# Patient Record
Sex: MALE | Race: BLACK OR AFRICAN AMERICAN
[De-identification: names, ages, dates, MRNs, and addresses within clinical notes are randomized per-mention and may not be internally consistent; named-entity substitution may affect disease eponyms.]

---

## 2018-06-24 ENCOUNTER — HOSPITAL ENCOUNTER (EMERGENCY)
Dept: HOSPITAL 74 - JER | Age: 63
LOS: 1 days | Discharge: LEFT BEFORE DISCHARGE | End: 2018-06-25
Payer: COMMERCIAL

## 2018-06-24 VITALS — SYSTOLIC BLOOD PRESSURE: 149 MMHG | HEART RATE: 82 BPM | TEMPERATURE: 98 F | DIASTOLIC BLOOD PRESSURE: 90 MMHG

## 2018-06-24 VITALS — BODY MASS INDEX: 35.7 KG/M2

## 2018-06-24 DIAGNOSIS — Z53.21: Primary | ICD-10-CM

## 2018-06-25 NOTE — PDOC
History of Present Illness





- General


Chief Complaint: Shortness of Breath


Stated Complaint: R ARM SWELLING


Time Seen by Provider: 06/25/18 00:04





- History of Present Illness


Initial Comments: 





06/25/18 00:07











The patient denies chest pain, shortness of breath, headache and dizziness. 

Denies fever, chills, nausea, vomit, diarrhea and constipation. Denies dysuria, 

frequency, urgency and hematuria. 


Allergies:





Past History





- Past Medical History


Allergies/Adverse Reactions: 


 Allergies











Allergy/AdvReac Type Severity Reaction Status Date / Time


 


No Known Allergies Allergy   Verified 06/24/18 23:50














- Suicide/Smoking/Psychosocial Hx


Smoking History: Never smoked


Have you smoked in the past 12 months: No


Information on smoking cessation initiated: No


Hx Alcohol Use: No


Drug/Substance Use Hx: No





**Review of Systems





- Review of Systems


Comments:: 





06/25/18 00:07


GENERAL/CONSTITUTIONAL: No fever or chills. No weakness.


HEAD, EYES, EARS, NOSE AND THROAT: No change in vision. No ear pain or 

discharge. No sore throat.


CARDIOVASCULAR: No chest pain or shortness of breath


RESPIRATORY: No cough, wheezing, or hemoptysis.


GASTROINTESTINAL: No nausea, vomiting, diarrhea or constipation.


GENITOURINARY: No dysuria, frequency, or change in urination.


MUSCULOSKELETAL: No joint or muscle swelling or pain. No neck or back pain.


SKIN: No rash


NEUROLOGIC: No headache, vertigo, loss of consciousness, or change in strength/

sensation.


ENDOCRINE: No increased thirst. No abnormal weight change


HEMATOLOGIC/LYMPHATIC: No anemia, easy bleeding, or history of blood clots.


ALLERGIC/IMMUNOLOGIC: No hives or skin allergy.





*Physical Exam





- Vital Signs


 Last Vital Signs











Temp Pulse Resp BP Pulse Ox


 


 98.0 F   82   16   149/90   90 L


 


 06/24/18 23:48  06/24/18 23:48  06/24/18 23:48  06/24/18 23:48  06/24/18 23:48














- Physical Exam


Comments: 





06/25/18 00:07


GENERAL: Awake, alert, and fully oriented, in no acute distress


HEAD: No signs of trauma, normocephalic, atraumatic 


EYES: PERRLA, EOMI, sclera anicteric, conjunctiva clear


ENT: Auricles normal inspection, hearing grossly normal, nares patent, 

oropharynx clear without


exudates. Moist mucosa


NECK: Normal ROM, supple, no lymphadenopathy, JVD, or masses


LUNGS: No distress, speaks full sentences, clear to auscultation bilaterally 


HEART: Regular rate and rhythm, normal S1 and S2, no murmurs, rubs or gallops, 

peripheral pulses normal and equal bilaterally. 


ABDOMEN: Soft, nontender, normoactive bowel sounds. No guarding, no rebound. No 

masses


EXTREMITIES: Normal inspection, Normal range of motion, no edema. No clubbing 

or cyanosis. 


NEUROLOGICAL: Cranial nerves II through XII grossly intact. Normal speech, 

normal gait, no focal sensorimotor deficits 


SKIN: Warm, Dry, normal turgor, no rashes or lesions noted. 





*DC/Admit/Observation/Transfer





- Referrals


Referrals: 


Mendez Junior MD [Primary Care Provider] - 





- Patient Instructions





- Post Discharge Activity

## 2019-03-07 ENCOUNTER — HOSPITAL ENCOUNTER (INPATIENT)
Dept: HOSPITAL 74 - JER | Age: 64
LOS: 9 days | Discharge: HOME | DRG: 190 | End: 2019-03-16
Attending: FAMILY MEDICINE | Admitting: FAMILY MEDICINE
Payer: COMMERCIAL

## 2019-03-07 VITALS — BODY MASS INDEX: 36.1 KG/M2

## 2019-03-07 DIAGNOSIS — J44.1: Primary | ICD-10-CM

## 2019-03-07 DIAGNOSIS — I11.0: ICD-10-CM

## 2019-03-07 DIAGNOSIS — J96.21: ICD-10-CM

## 2019-03-07 DIAGNOSIS — R91.1: ICD-10-CM

## 2019-03-07 DIAGNOSIS — E78.00: ICD-10-CM

## 2019-03-07 DIAGNOSIS — I50.33: ICD-10-CM

## 2019-03-07 DIAGNOSIS — E87.2: ICD-10-CM

## 2019-03-07 DIAGNOSIS — J96.22: ICD-10-CM

## 2019-03-07 DIAGNOSIS — E66.01: ICD-10-CM

## 2019-03-07 DIAGNOSIS — Z72.0: ICD-10-CM

## 2019-03-07 DIAGNOSIS — G47.33: ICD-10-CM

## 2019-03-07 LAB
ALBUMIN SERPL-MCNC: 3.7 G/DL (ref 3.4–5)
ALP SERPL-CCNC: 98 U/L (ref 45–117)
ALT SERPL-CCNC: 19 U/L (ref 13–61)
ANION GAP SERPL CALC-SCNC: 5 MMOL/L (ref 8–16)
ARTERIAL BLOOD GAS PCO2: 68.2 MMHG (ref 35–45)
ARTERIAL PATENCY WRIST A: POSITIVE
AST SERPL-CCNC: 13 U/L (ref 15–37)
BASE EXCESS BLDA CALC-SCNC: 7.7 MEQ/L (ref -2–2)
BASOPHILS # BLD: 0.8 % (ref 0–2)
BILIRUB SERPL-MCNC: 0.2 MG/DL (ref 0.2–1)
BNP SERPL-MCNC: 296.8 PG/ML (ref 5–125)
BUN SERPL-MCNC: 10 MG/DL (ref 7–18)
CALCIUM SERPL-MCNC: 8.8 MG/DL (ref 8.5–10.1)
CHLORIDE SERPL-SCNC: 102 MMOL/L (ref 98–107)
CO2 SERPL-SCNC: 35 MMOL/L (ref 21–32)
COHGB MFR BLD: 1 GM% (ref 0.5–2)
CREAT SERPL-MCNC: 1.1 MG/DL (ref 0.55–1.3)
DEPRECATED RDW RBC AUTO: 16.9 % (ref 11.9–15.9)
EOSINOPHIL # BLD: 6 % (ref 0–4.5)
GLUCOSE SERPL-MCNC: 105 MG/DL (ref 74–106)
HCT VFR BLD CALC: 41.4 % (ref 35.4–49)
HGB BLD-MCNC: 13.2 GM/DL (ref 11.7–16.9)
INR BLD: 1.05 (ref 0.83–1.09)
LYMPHOCYTES # BLD: 22 % (ref 8–40)
MAGNESIUM SERPL-MCNC: 1.9 MG/DL (ref 1.8–2.4)
MCH RBC QN AUTO: 28.5 PG (ref 25.7–33.7)
MCHC RBC AUTO-ENTMCNC: 32 G/DL (ref 32–35.9)
MCV RBC: 89.3 FL (ref 80–96)
MONOCYTES # BLD AUTO: 9.2 % (ref 3.8–10.2)
NEUTROPHILS # BLD: 62 % (ref 42.8–82.8)
PH BLDV: 7.34 [PH] (ref 7.32–7.42)
PLATELET # BLD AUTO: 266 K/MM3 (ref 134–434)
PMV BLD: 8.2 FL (ref 7.5–11.1)
PO2 BLDA: 60.3 MMHG (ref 80–100)
POTASSIUM SERPLBLD-SCNC: 4.2 MMOL/L (ref 3.5–5.1)
PROT SERPL-MCNC: 8 G/DL (ref 6.4–8.2)
PT PNL PPP: 12.4 SEC (ref 9.7–13)
RBC # BLD AUTO: 4.64 M/MM3 (ref 4–5.6)
SAO2 % BLDA: 87.5 % (ref 90–98.9)
SODIUM SERPL-SCNC: 142 MMOL/L (ref 136–145)
VENOUS PC02: 64.8 MMHG (ref 38–52)
VENOUS PO2: 130 MMHG (ref 28–48)
WBC # BLD AUTO: 6.8 K/MM3 (ref 4–10)

## 2019-03-07 NOTE — PDOC
History of Present Illness





- General


Chief Complaint: Shortness of Breath


Stated Complaint: PCP SENT/DIFFICULTY BREATHING


Time Seen by Provider: 03/07/19 17:58


History Source: Patient


Exam Limitations: No Limitations





- History of Present Illness


Initial Comments: 





03/07/19 19:19


The patient is a 63M with a PMH of COPD, asthma who was sent by pulmonologist (

Dr. Villegas) for admission for SOB. The patient states that he's had worsening 

SOB for the past week and a half. He admits to BAEZ but denies orthopnea. He 

admits to current SOB at rest. He denies CP, fever, chills, nausea, vomiting. 





Past History





- Past Medical History


Allergies/Adverse Reactions: 


 Allergies











Allergy/AdvReac Type Severity Reaction Status Date / Time


 


No Known Allergies Allergy   Verified 03/07/19 17:36














- Suicide/Smoking/Psychosocial Hx


Smoking History: Current every day smoker


Have you smoked in the past 12 months: No


Number of Cigarettes Smoked Daily: 40


Information on smoking cessation initiated: No


Hx Alcohol Use: No


Drug/Substance Use Hx: No





**Review of Systems





- Review of Systems


Able to Perform ROS?: Yes


Comments:: 





03/07/19 19:25


GENERAL/CONSTITUTIONAL: No fever or chills. No weakness.


HEAD, EYES, EARS, NOSE AND THROAT: No change in vision. No ear pain or 

discharge. No sore throat.


CARDIOVASCULAR: No chest pain, palpitations, or lightheadedness.


RESPIRATORY: + for SOB. No cough or hemoptysis.


GASTROINTESTINAL: No nausea, vomiting, diarrhea, constipation, or abdominal 

pain. 


GENITOURINARY: No dysuria, frequency, hematuria, or change in urination.


MUSCULOSKELETAL: No joint or muscle swelling or pain. No neck or back pain.


SKIN: No rash or lesions. 


NEUROLOGIC: No headache, numbness, tingling, focal weakness, loss of 

consciousness, or change in strength/sensation.





Is the patient limited English proficient: No





*Physical Exam





- Vital Signs


 Last Vital Signs











Temp Pulse Resp BP Pulse Ox


 


 98.0 F   81   18   141/82   100 


 


 03/07/19 17:36  03/07/19 18:44  03/07/19 18:44  03/07/19 17:36  03/07/19 18:44














- Physical Exam


Comments: 





03/07/19 19:28


GENERAL: Well developed, well nourished. Awake and alert. In mild distress.


HEENT: Normocephalic, atraumatic. Hearing grossly normal. Moist mucous 

membranes. PERRLA, EOMI. No conjunctival pallor. 


NECK: Supple. Full ROM. No JVD. 


CARDIOVASCULAR: Regular rate and rhythm. No murmurs, rubs, or gallops. Distal 

pulses are 2+ and symmetric. 


PULMONARY: Mild respiratory distress. Diffuse wheezing in b/l lung fields with 

decreased breath sounds in bases.


ABDOMINAL: Soft. Non-tender. Non-distended. No rebound or guarding. 


GENITOURINARY: No CVA tenderness bilaterally.


MUSCULOSKELETAL: Normal range of motion at all joints. No bony deformities or 

tenderness. 


EXTREMITIES: No cyanosis. No clubbing. No edema. No calf tenderness or swelling.


SKIN: Warm and dry. Normal capillary refill. No rashes. No jaundice. 


NEUROLOGICAL: Alert, awake, appropriate. Cranial nerves 2-12 grossly intact. 

Normal speech. Gait is normal without ataxia.


PSYCHIATRIC: Cooperative. Good eye contact. Appropriate mood and affect.








Moderate Sedation





- Procedure Monitoring


Vital Signs: 


Procedure Monitoring Vital Signs











Temperature  98.0 F   03/07/19 17:36


 


Pulse Rate  81   03/07/19 18:44


 


Respiratory Rate  18   03/07/19 18:44


 


Blood Pressure  141/82   03/07/19 17:36


 


O2 Sat by Pulse Oximetry (%)  100   03/07/19 18:44











ED Treatment Course





- LABORATORY


CBC & Chemistry Diagram: 


 03/07/19 17:15





 03/07/19 17:15





- ADDITIONAL ORDERS


Additional order review: 


 Laboratory  Results











  03/07/19





  17:15


 


Sodium  142


 


Potassium  4.2


 


Chloride  102


 


Carbon Dioxide  35 H


 


Anion Gap  5 L


 


BUN  10


 


Creatinine  1.1


 


Creat Clearance w eGFR  > 60


 


Random Glucose  105


 


Calcium  8.8


 


Magnesium  1.9


 


Total Bilirubin  0.2


 


AST  13 L


 


ALT  19


 


Alkaline Phosphatase  98


 


Creatine Kinase  404 H


 


Troponin I  < 0.02


 


B-Natriuretic Peptide  296.8 H


 


Total Protein  8.0


 


Albumin  3.7








 











  03/07/19





  17:15


 


RBC  4.64


 


MCV  89.3


 


MCHC  32.0


 


RDW  16.9 H


 


MPV  8.2


 


Neutrophils %  62.0


 


Lymphocytes %  22.0


 


Monocytes %  9.2


 


Eosinophils %  6.0 H


 


Basophils %  0.8














- RADIOLOGY


Radiology Studies Ordered: 














 Category Date Time Status


 


 CHEST X-RAY PORTABLE* [RAD] Stat Radiology  03/07/19 18:05 Taken














- Medications


Given in the ED: 


ED Medications














Discontinued Medications














Generic Name Dose Route Start Last Admin





  Trade Name Katrin  PRN Reason Stop Dose Admin


 


Albuterol/Ipratropium  3 amp  03/07/19 18:06  03/07/19 18:07





  Duoneb -  NEB  03/07/19 18:07  3 amp





  ONCE ONE   Administration





     





     





     





     


 


Methylprednisolone Sodium Succinate  125 mg  03/07/19 18:06  03/07/19 18:30





  Solu-Medrol -  IVPUSH  03/07/19 18:07  125 mg





  ONCE ONE   Administration





     





     





     





     














Medical Decision Making





- Medical Decision Making





03/07/19 19:29


The patient is a 63M with a PMH of COPD, asthma who presents to the ER via PCP'

s recommendations for SOB. Will treat as COPD exacerbation. Pt much more 

comfortable after treatments. Will admit for hypoxia workup.





*DC/Admit/Observation/Transfer


Diagnosis at time of Disposition: 


 SOB (shortness of breath)








- Discharge Dispostion


Condition at time of disposition: Guarded


Decision to Admit order: Yes





- Referrals


Referrals: 


Mendez Junior MD [Primary Care Provider] - 





- Patient Instructions





- Post Discharge Activity

## 2019-03-07 NOTE — PDOC
Rapid Medical Evaluation


Chief Complaint: Shortness of Breath


Medical Evaluation: 


 Allergies











Allergy/AdvReac Type Severity Reaction Status Date / Time


 


No Known Allergies Allergy   Verified 03/07/19 17:36











03/07/19 17:37


I have performed a brief in-person evaluation of this patient. 


The patient presents with a chief complaint of: SOB, cough, worsening x 2 weeks 


Pertinent physical exam findings: 3 word sentences, Pursed lipped , retractions 

dim breath sounds 


I have ordered the following: EkG


The patient will proceed to the ED for further evaluation. taken into ER for 

eval.


03/07/19 17:38





03/07/19 17:39





03/07/19 17:42








**Discharge Disposition





- Diagnosis


 SOB (shortness of breath)








- Referrals





- Patient Instructions





- Post Discharge Activity
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
## 2019-03-07 NOTE — PDOC
Attending Attestation





- Resident


Resident Name: Tulio Kaminski





- HPI


HPI: 





03/07/19 18:38


Pt presents to the ED complaining of the acute onset of shortness of breath and 

and leg swelling unrelieved by nebs at home.  Differential includes COPD 

exacerbation, CHF, less likely PE or PNA.  Will treat with nebs and steroids, 

check labs, EKG and CXR.  Will admit to medicine. 





<Mireille Rose - Last Filed: 03/07/19 18:37>





- HPI


HPI: 


The patient is a 63 year old male, with a significant PMH of COPD and asthma, 

who presents to the emergency department today complaining of shortness of 

breath for one week. Patient reports that his dyspnea on exertion has 

progressively worsened from baseline over the past week. Patient denies using 

O2 at home, but does endorse using his nebulizer. He reports bilateral lower 

extremity edema. Dr. Villegas advised patient to come to ED for treatment of his 

low O2 levels. 


 





The patient denies chest pain, headache and dizziness.


Denies fever, chills, nausea, vomit, diarrhea and constipation.


Denies dysuria, frequency, urgency and hematuria.





Allergies: NKA


Past surgical history:


Social history: No reported


PCP: Dr. Mendez Junior 


Pulmonologist: Dr. Mendez Villegas 





03/07/19 19:35








- Physicial Exam


PE: 


GENERAL: Awake, alert, and fully oriented, in no acute distress


HEAD: No signs of trauma


EYES: PERRLA, EOMI, sclera anicteric, conjunctiva clear


ENT: Auricles normal inspection, hearing grossly normal, nares patent, 

oropharynx clear without exudates. Moist mucosa


NECK: Normal ROM, supple, no lymphadenopathy, JVD, or masses


LUNGS: +Tachypneic. +Diffuse wheezing bilaterally. +Speaking in short sentences 

with mild conversational dyspnea. No crackles


HEART: Regular rate and rhythm, normal S1 and S2, no murmurs, rubs or gallops


ABDOMEN: Soft, nontender, normoactive bowel sounds.  No guarding, no rebound.  

No masses


EXTREMITIES: +1+ pitting edema to the mid calves bilaterally. Normal range of 

motion.  No clubbing or cyanosis. No cords, erythema, or tenderness


NEUROLOGICAL: Cranial nerves II through XII grossly intact.  Normal speech, 

normal gait


SKIN: Warm, Dry, normal turgor, no rashes or lesions noted.


03/07/19 19:35








- Medical Decision Making


EXAM#: TYPE/EXAM: RESULT: [id number] RAD/CHEST X-RAY PORTABLE* Shortness of 

breath. 


Since prior chest x-ray dated 11/17/2017, the cardiac silhouette appears to be 

slightly enlarged likely due to magnification. There are minimal bibasal 

atelectatic changes. The rest of the lung is clear. Mediastinum and visualized 

osseous structures appear intact Impression: Minimal bibasal atelectatic 

changes without gross evidence of infiltrates. Reported By: Elham Gray MD 03/07/ 19 19:35 





Documentation prepared by VIDA Zimmer, acting as medical scribe for 

Alejandro Britt MD.


03/07/19 19:36








<Diamante Barros - Last Filed: 03/07/19 19:38>

## 2019-03-08 LAB
ANION GAP SERPL CALC-SCNC: 2 MMOL/L (ref 8–16)
ARTERIAL BLOOD GAS PCO2: 62.1 MMHG (ref 35–45)
ARTERIAL PATENCY WRIST A: POSITIVE
BASE EXCESS BLDA CALC-SCNC: 11 MEQ/L (ref -2–2)
BASOPHILS # BLD: 0.2 % (ref 0–2)
BUN SERPL-MCNC: 13 MG/DL (ref 7–18)
CALCIUM SERPL-MCNC: 9.1 MG/DL (ref 8.5–10.1)
CHLORIDE SERPL-SCNC: 98 MMOL/L (ref 98–107)
CHOLEST SERPL-MCNC: 149 MG/DL (ref 50–200)
CO2 SERPL-SCNC: 39 MMOL/L (ref 21–32)
CREAT SERPL-MCNC: 0.9 MG/DL (ref 0.55–1.3)
DEPRECATED RDW RBC AUTO: 16.6 % (ref 11.9–15.9)
EOSINOPHIL # BLD: 0 % (ref 0–4.5)
GLUCOSE SERPL-MCNC: 124 MG/DL (ref 74–106)
HCT VFR BLD CALC: 41.8 % (ref 35.4–49)
HDLC SERPL-MCNC: 58 MG/DL (ref 40–60)
HGB BLD-MCNC: 13.3 GM/DL (ref 11.7–16.9)
LYMPHOCYTES # BLD: 10.4 % (ref 8–40)
MAGNESIUM SERPL-MCNC: 2 MG/DL (ref 1.8–2.4)
MCH RBC QN AUTO: 28.7 PG (ref 25.7–33.7)
MCHC RBC AUTO-ENTMCNC: 31.9 G/DL (ref 32–35.9)
MCV RBC: 90.2 FL (ref 80–96)
MONOCYTES # BLD AUTO: 1.3 % (ref 3.8–10.2)
NEUTROPHILS # BLD: 88.1 % (ref 42.8–82.8)
PLATELET # BLD AUTO: 272 K/MM3 (ref 134–434)
PMV BLD: 8.6 FL (ref 7.5–11.1)
PO2 BLDA: 58.5 MMHG (ref 80–100)
POTASSIUM SERPLBLD-SCNC: 5 MMOL/L (ref 3.5–5.1)
RBC # BLD AUTO: 4.63 M/MM3 (ref 4–5.6)
SAO2 % BLDA: 88.6 % (ref 90–98.9)
SODIUM SERPL-SCNC: 138 MMOL/L (ref 136–145)
TRIGL SERPL-MCNC: 51 MG/DL (ref 0–150)
WBC # BLD AUTO: 6.1 K/MM3 (ref 4–10)

## 2019-03-08 RX ADMIN — METHYLPREDNISOLONE SODIUM SUCCINATE SCH MG: 40 INJECTION, POWDER, FOR SOLUTION INTRAMUSCULAR; INTRAVENOUS at 22:45

## 2019-03-08 RX ADMIN — LISINOPRIL SCH MG: 10 TABLET ORAL at 10:08

## 2019-03-08 RX ADMIN — CEFTRIAXONE SCH MLS/HR: 1 INJECTION, POWDER, FOR SOLUTION INTRAMUSCULAR; INTRAVENOUS at 13:32

## 2019-03-08 RX ADMIN — FERROUS SULFATE TAB EC 324 MG (65 MG FE EQUIVALENT) SCH MG: 324 (65 FE) TABLET DELAYED RESPONSE at 13:31

## 2019-03-08 RX ADMIN — INSULIN ASPART SCH: 100 INJECTION, SOLUTION INTRAVENOUS; SUBCUTANEOUS at 22:45

## 2019-03-08 RX ADMIN — IPRATROPIUM BROMIDE AND ALBUTEROL SULFATE SCH AMP: .5; 3 SOLUTION RESPIRATORY (INHALATION) at 20:00

## 2019-03-08 RX ADMIN — METHYLPREDNISOLONE SODIUM SUCCINATE SCH MG: 40 INJECTION, POWDER, FOR SOLUTION INTRAMUSCULAR; INTRAVENOUS at 11:31

## 2019-03-08 RX ADMIN — INSULIN ASPART SCH: 100 INJECTION, SOLUTION INTRAVENOUS; SUBCUTANEOUS at 16:21

## 2019-03-08 RX ADMIN — ATORVASTATIN CALCIUM SCH MG: 80 TABLET, FILM COATED ORAL at 22:45

## 2019-03-08 RX ADMIN — AZITHROMYCIN SCH MG: 250 TABLET, FILM COATED ORAL at 10:08

## 2019-03-08 RX ADMIN — METHYLPREDNISOLONE SODIUM SUCCINATE SCH MG: 40 INJECTION, POWDER, FOR SOLUTION INTRAMUSCULAR; INTRAVENOUS at 16:23

## 2019-03-08 RX ADMIN — RANITIDINE SCH MG: 150 TABLET ORAL at 13:31

## 2019-03-08 RX ADMIN — IPRATROPIUM BROMIDE AND ALBUTEROL SULFATE SCH AMP: .5; 3 SOLUTION RESPIRATORY (INHALATION) at 16:31

## 2019-03-08 RX ADMIN — POTASSIUM CHLORIDE SCH MEQ: 750 TABLET, EXTENDED RELEASE ORAL at 10:08

## 2019-03-08 RX ADMIN — IPRATROPIUM BROMIDE AND ALBUTEROL SULFATE SCH AMP: .5; 3 SOLUTION RESPIRATORY (INHALATION) at 08:00

## 2019-03-08 RX ADMIN — IPRATROPIUM BROMIDE AND ALBUTEROL SULFATE SCH AMP: .5; 3 SOLUTION RESPIRATORY (INHALATION) at 11:40

## 2019-03-08 RX ADMIN — ENOXAPARIN SODIUM SCH MG: 40 INJECTION SUBCUTANEOUS at 11:31

## 2019-03-08 RX ADMIN — INSULIN ASPART SCH: 100 INJECTION, SOLUTION INTRAVENOUS; SUBCUTANEOUS at 11:40

## 2019-03-08 RX ADMIN — NICOTINE SCH MG: 21 PATCH TRANSDERMAL at 22:45

## 2019-03-08 NOTE — CON.CARD
Consult


Consult Specialty:: Cardiology


Reason for Consultation:: BAEZ





- History of Present Illness


Chief Complaint: SOB


History of Present Illness: 





This is a 63 year old male with a long smoking history and COPD. He has been 

complaining of worsening SOB fir the past 2 weeks. He also has orthopnea. 


EKG NSR at 81 BPM with normal intervals, normal axis, and NSSTTW changes.


Troponin negative





Echocardiogram 3/9/19


Normal LV function


EF 50 - 55%


mild MR


Mild TR














- Alcohol/Substance Use


Hx Alcohol Use: No





- Smoking History


Smoking history: Current every day smoker


Have you smoked in the past 12 months: No


Aproximately how many cigarettes per day: 40





Home Medications





- Allergies


Allergies/Adverse Reactions: 


 Allergies











Allergy/AdvReac Type Severity Reaction Status Date / Time


 


No Known Allergies Allergy   Verified 03/07/19 17:36














- Home Medications


Home Medications: 


Ambulatory Orders





Atorvastatin Ca [Lipitor] 80 mg PO HS 03/07/19 


Ferrous Sulfate 325 mg PO DAILY 03/07/19 


Furosemide [Lasix] 40 mg PO DAILY 03/07/19 


Lisinopril 10 mg PO DAILY 03/07/19 


Potassium Chloride 10 meq PO DAILY 03/07/19 


Ranitidine [Zantac -] 300 mg PO ONCE 03/07/19 


Albuterol 2.5/Ipratropium 0.5 [Duoneb -] 1 amp NEB PRN 03/08/19 


Prednisone [Deltasone] 20 mg PO DAILY 03/08/19 








Vital Signs: 


 Vital Signs











Temperature  97.6 F   03/08/19 10:10


 


Pulse Rate  84   03/08/19 10:10


 


Respiratory Rate  20   03/08/19 10:10


 


Blood Pressure  153/75   03/08/19 10:10


 


O2 Sat by Pulse Oximetry (%)  90 L  03/08/19 09:00











Constitutional: Yes: Mild Distress


HENT: Yes: WNL


Neck: Yes: WNL


Respiratory: Yes: Wheezes (Bilaterally)


Gastrointestinal: Yes: Soft


Cardiovascular: Yes: Regular Rate and Rhythm (NL S1S2 no MRHG)


JVD: No


Edema: LLE: 1+, RLE: 1+


Neurological: Yes: Alert, Oriented





- Other Data


Labs, Other Data: 


 CBC, BMP





 03/08/19 06:00 





 03/08/19 06:00 





 INR, PTT











INR  1.05  (0.83-1.09)   03/07/19  17:15    








 Troponin, BNP











  03/07/19 03/08/19 03/08/19





  17:15 06:00 11:45


 


Troponin I  < 0.02  < 0.02  0.03


 


B-Natriuretic Peptide  296.8 H  








 Troponin, BNP











  03/07/19 03/08/19 03/08/19





  17:15 06:00 11:45


 


Troponin I  < 0.02  < 0.02  0.03


 


B-Natriuretic Peptide  296.8 H  














Assessment/Plan





63 year old male with a long smoking history and COPD. He has been complaining 

of worsening SOB fir the past 2 weeks. He also has orthopnea. 


EKG NSR at 81 BPM with normal intervals, normal axis, and NSSTTW changes.


Troponin negative





Echocardiogram 3/9/19


Normal LV function


EF 50 - 55%


mild MR


Mild TR





COPD exacerbation


Also evidence for HFpEF


Would give Lasix 40 IVSS BID


Follow I's/O's/Wt's/Lytes





Continue Lisinopril 10 mg daily





Will follow with you.

## 2019-03-08 NOTE — PN
Progress Note (short form)





- Note


Progress Note: 


PULMONARY








CONSULTATION DICTATED 3/8/19








IMP ACUTE ON CHRONIC HYPOXEMIC/HYPERCAPNEIC RESPIRATORY FAILURE


      ADVANCED COPD WITH ACUTE EXACERBATION


      HTN


      ? H/O CHF


      TOBACCO ABUSE


       LIKELY WILSON


      





PLAN IV STEROIDS


        INHALED BRONCHODILATORS


        SUPPLEMENTAL O2


        NIPPV AS NEEDED


        LASIX PO 


        CARDIOLOGY EVALUATION








DR SMITH


   


      


       


  





Problem List





- Problems


(1) Acute on chronic respiratory failure with hypoxia and hypercapnia


Code(s): J96.21 - ACUTE AND CHRONIC RESPIRATORY FAILURE WITH HYPOXIA; J96.22 - 

ACUTE AND CHRONIC RESPIRATORY FAILURE WITH HYPERCAPNIA   





(2) SOB (shortness of breath)


Code(s): R06.02 - SHORTNESS OF BREATH   





(3) COPD exacerbation


Code(s): J44.1 - CHRONIC OBSTRUCTIVE PULMONARY DISEASE W (ACUTE) EXACERBATION   





(4) HTN (hypertension)


Code(s): I10 - ESSENTIAL (PRIMARY) HYPERTENSION   





(5) Tobacco abuse


Code(s): Z72.0 - TOBACCO USE   





(6) Tobacco abuse counseling


Code(s): Z71.6 - TOBACCO ABUSE COUNSELING

## 2019-03-08 NOTE — PN
Progress Note, Physician


Chief Complaint: 





patient seen and examined sitting up in bed says his breathing is better





- Current Medication List


Current Medications: 


Active Medications





Albuterol Sulfate (Ventolin 0.083% Nebulizer Soln -)  1 amp NEB Q4H PRN


   PRN Reason: SHORT OF BREATH/WHEEZING


Albuterol/Ipratropium (Duoneb -)  1 amp NEB RQID [facility]


   Last Admin: 03/08/19 11:40 Dose:  1 amp


Atorvastatin Calcium (Lipitor -)  80 mg PO HS [facility]


Azithromycin (Zithromax -)  500 mg PO DAILY [facility]


   Last Admin: 03/08/19 10:08 Dose:  500 mg


Enoxaparin Sodium (Lovenox -)  40 mg SQ DAILY [facility]


   Last Admin: 03/08/19 11:31 Dose:  40 mg


Ferrous Sulfate (Feosol -)  325 mg PO DAILY [facility]


Furosemide (Lasix -)  40 mg PO DAILY [facility]


Insulin Aspart (Novolog Vial Sliding Scale -)  0 vial SQ ACHS [facility]; Protocol


   Last Admin: 03/08/19 11:40 Dose:  Not Given


Lisinopril (Prinivil)  10 mg PO DAILY [facility]


   Last Admin: 03/08/19 10:08 Dose:  10 mg


Methylprednisolone Sodium Succinate (Solu-Medrol -)  60 mg IVPUSH Q6H-IV [facility]


   Last Admin: 03/08/19 11:31 Dose:  60 mg


Nicotine (Nicoderm Patch -)  14 mg TD ONCE ONE


   Stop: 03/08/19 19:49


Nicotine (Nicoderm Patch -)  21 mg TD [facility]


Potassium Chloride (K-Dur -)  10 meq PO DAILY [facility]


   Last Admin: 03/08/19 10:08 Dose:  10 meq


Ranitidine HCl (Zantac -)  300 mg PO DAILY [facility]











- Objective


Vital Signs: 


 Vital Signs











Temperature  97.6 F   03/08/19 10:10


 


Pulse Rate  84   03/08/19 10:10


 


Respiratory Rate  20   03/08/19 10:10


 


Blood Pressure  153/75   03/08/19 10:10


 


O2 Sat by Pulse Oximetry (%)  96   03/08/19 03:19











Constitutional: Yes: Calm


Cardiovascular: Yes: Regular Rate and Rhythm, S1, S2


Respiratory: Yes: Wheezes


Gastrointestinal: Yes: Normal Bowel Sounds, Soft


Edema: No


Neurological: Yes: Alert, Oriented


Labs: 


 CBC, BMP





 03/08/19 06:00 





 03/08/19 06:00 





 INR, PTT











INR  1.05  (0.83-1.09)   03/07/19  17:15    














Problem List





- Problems


(1) SOB (shortness of breath)


Assessment/Plan: 


oxygen


steroids


iv abx


chest ct done report pending


lovenox


lasix


echo report noted EF 55% left ventricle systolic function is normal


Code(s): R06.02 - SHORTNESS OF BREATH

## 2019-03-08 NOTE — EKG
Test Reason : 

Blood Pressure : ***/*** mmHG

Vent. Rate : 081 BPM     Atrial Rate : 081 BPM

   P-R Int : 182 ms          QRS Dur : 094 ms

    QT Int : 382 ms       P-R-T Axes : 054 079 065 degrees

   QTc Int : 443 ms

 

NORMAL SINUS RHYTHM

T WAVE ABNORMALITY, CONSIDER ANTEROLATERAL ISCHEMIA

ABNORMAL ECG

NO PREVIOUS ECGS AVAILABLE

Confirmed by CHETNA VILLAGRAN MD ([id number]) on 3/8/2019 2:20:58 PM

 

Referred By:             Confirmed By:CHETNA VILLAGRAN MD

## 2019-03-08 NOTE — PN
Teaching Attending Note


Name of Resident: Buddy Swan





ATTENDING PHYSICIAN STATEMENT





I saw and evaluated the patient.


I reviewed the resident's note and discussed the case with the resident.


I agree with the resident's findings and plan as documented.








SUBJECTIVE:states breathing is much improved. was recently diagnosed with "

heart failure" 6 months ago when admitted to [facility] but has never 

been admitted for breathing issues. was not started on any new medications 

since his last hospitalization. denies CP, fever, chills, N/V/C/D, does not 

have orthopnea as he always sleeps on 2 pillows








OBJECTIVE:


 Last Vital Signs











Temp Pulse Resp BP Pulse Ox


 


 97.6 F   84   20   153/75   96 


 


 03/08/19 10:10  03/08/19 10:10  03/08/19 10:10  03/08/19 10:10  03/08/19 03:19








 Intake & Output











 03/05/19 03/06/19 03/07/19 03/08/19





 23:59 23:59 23:59 23:59


 


Intake Total    690


 


Balance    690


 


Weight   244 lb 236 lb








General NAD


CV S1 S2 RRR no murmur/rub/gallop


Lungs diffuse expiratory wheezing


Abdomen soft NT/ND obese


Extremities no pedal edema





ASSESSMENT AND PLAN:


62yo M with PMH CHF, HTN and COPD presented to the ER wtih progressively 

worsening SOB x2 weeks and founf to be in hypercapnic respiratory failure


1. Acute hypercapnic respiratory failure- due to COPD exacerbation. was on 

bipap overnight and now on 2L NC saturating 95%. repeat ABG CT chest ordered. 

cont wtih Medrol 60mg Q6H, inhalers and nebulizers. 


2. Heart failure- unknown if systolic vs diastolic. does not appear to be 

volume overloaded at this time. with normal BNP. echo ordered by night team and 

will follow up. cardio consulted. will confirm home medications and re-start 

them. would hold off on lasix IV at this time. cardiac markers neg x2


3. HTN- slightly above goal. will titrate medications as needed


4. DVT ppx- lovenox

## 2019-03-08 NOTE — ECHO
______________________________________________________________________________



Name: ANN MARIE GRANADO                                      Exam:Adult Echocardiogram

MRN: [id number]         Study Date: 03/08/2019 10:15 AM

Age: 63 yrs

______________________________________________________________________________



Reason For Study: Evaluate for CHF

Height: 69 in        Weight: 244 lb        BSA: 2.2 m2



______________________________________________________________________________



MMode/2D Measurements & Calculations

IVSd: 1.1 cm                                                 ACS: 1.9 cm

LVIDd: 4.4 cm

LVIDs: 3.4 cm

LVPWd: 1.3 cm



______________________________________________________________

EDV(Teich): 87.3 ml                                          LVOT diam: 2.0 cm

ESV(Teich): 47.5 ml



______________________________________________________________

RV S James: 13.2 cm/sec



Doppler Measurements & Calculations

Med Peak E' James: 8.4 cm/sec

Lat Peak E' James: 16.4 cm/sec





______________________________________________________________________________

Procedure

The study was technically difficult with many images being suboptimal in quality.

Left Ventricle

Left ventricular systolic function is grossly normal. Ejection Fraction = 50-55%. Regional wall motio
n

abnormalities cannot be excluded due to limited visualization.

Right Ventricle

The right ventricle is grossly normal size. The right ventricular systolic function is grossly normal
.

Atria

Normal left and right atrial size and function.

Mitral Valve

The mitral valve is grossly normal. There is no mitral valve stenosis. There is mild mitral regurgita
tion.

Tricuspid Valve

The tricuspid valve is not well visualized, but is grossly normal.

Aortic Valve

There is mild aortic sclerosis.;. No hemodynamically significant valvular aortic stenosis. No aortic

regurgitation is present.

Pulmonic Valve

The pulmonic valve is not well visualized.

Great Vessels

The aortic root is normal size.

Pericardium/Pleura

There is no pericardial effusion.



______________________________________________________________________________



Interpretation Summary

The study was technically difficult with many images being suboptimal in quality.

Regional wall motion abnormalities cannot be excluded due to limited visualization.

Left ventricular systolic function is grossly normal.

Ejection Fraction = 50-55%.

There is mild mitral regurgitation.

There is mild aortic sclerosis.;

There is no pericardial effusion.







MD Chau *Mandy 03/08/2019 11:01 AM

## 2019-03-08 NOTE — CONS
DATE OF CONSULTATION:  03/08/2019

 

REFERRING PHYSICIAN:  Kelly Aguilar MD 

 

HISTORY:  The patient is a 63-year-old black male known to me from previous 
office

visits with past medical history of COPD on inhaled bronchodilators 
noncompliant with

longstanding history of tobacco use currently still smoking admitted to [facility] with the complaint of a 3- to 4-week-history of increasing

shortness of breath, dyspnea on exertion.  The patient came to my office 
yesterday

for follow up visit.  I have not seen the patient in over a year and a half.  
He was

previously prescribed long acting inhaled bronchodilators as well as 
anticholinergic

but stopped taking them many months ago. Pt presented to my office yesterday 
with

increasing shortness of breath and dyspnea on exertion.  He denied any 
complaints of

chest pain, nausea, or vomiting.  He did have a cough productive of clear 
sputum.  He was

states that he is only able to ambulate a couple of feet without developing 
shortness

of breath.  He also has increasing orthopnea and PND.

 There was no history of DVT or PE.  When I examined him in the

office, he was noted to be hypoxic with an O2 saturation of 75% on room air at 
which

time he was advised to go to the emergency room.  On admission, he was placed on

inhaled bronchodilators and IV steroids and transferred to the medical floor for

further management.  He had blood gas performed, which revealed acute on chronic

hypercapnic, hypoxemic respiratory failure.  The patient is not on home oxygen

therapy.  In the past, his O2 was 92 on room air approximately an hour and a 
half

ago.

 

PAST MEDICAL HISTORY:  Again, advanced COPD, hypertension, hypercholesterolemia.

 

REVIEW OF SYSTEMS:  Positive shortness of breath, positive orthopnea, PND.  No 
chest

pain, no palpitations.  Positive cough, positive wheezing.  No abdominal pain. 

Positive lower extremity edema.

 

CURRENT MEDICATIONS:  Include Solu-Medrol, Prinivil, Zithromax, Lovenox, 
Nicoderm,

albuterol, DuoNeb, Zantac, Lipitor, Lasix, and K-Dur.

 

PHYSICAL EXAMINATION: 

General:  The patient is an obese male well developed, awake, alert feeling 
better in

no acute respiratory distress.

Vital Signs:  He is afebrile.  Blood pressure 153/75, respiratory rate 20, O2

saturation 90% on 3 L.

HEENT:  Normocephalic and atraumatic.

Neck:  Supple.

Heart:  Regular S1, S2.

Chest:  Diffuse bilateral wheezes.

Abdomen:  Soft.  Bowel sounds positive.

Extremities:  Bilateral lower extremity edema.

 

LABORATORIES:  WBC 6.1, hemoglobin 13.3, hematocrit 41.8 with a platelet count 
of

272,000.  INR 1.05.  Blood gas initially 7.33, PCO2 of 68, PO2 of 60, 
bicarbonate 35,

saturation 87 that was on 2 L nasal cannula.  Most recent this morning 7.40, 
PCO2 of

62, PO2 of 58, bicarbonate of 37, saturation 88.6.  BUN 13, creatinine 0.9, BNP 
296. 

Of note, this patient had PFT yesterday in my office that revealed severe 
obstruction

with some improvement post bronchodilator.

 

Chest CT, no acute infiltrates or effusions.  COPD with central lobular 
emphysema

again seen.  Nodularity in the right upper lobe.  No change from exam December 5
, 2017.

 

IMPRESSION:  

1.  Acute on chronic hypercapnic, hypoxemic respiratory failure secondary to 
chronic

obstructive pulmonary disease exacerbation.

2.  History of hypertension.

3.  ? component of congestive heart failure.

 

PLAN:  IV steroids.  Inhaled bronchodilators.  Supplemental O2.   Lasix.  Daily

weighs.  We will obtain sleep screen.  Smoking cessation counseled.  Continue

Nicoderm patch.

 

 

CORNELIO SMITH M.D.

 

[id number]

DD: 03/08/2019 14:02

DT: 03/08/2019 14:25

Job #:  [id number]

MTDD

## 2019-03-08 NOTE — PN
Physical Exam: 


SUBJECTIVE: Patient seen and examined at bedside. no acute events since 

admission. pt feels better. states breathing is much improved. meds have been 

reconciled.  denies CP, fever, chills, N/V/C/D, does not have orthopnea as he 

always sleeps on 2 pillows








OBJECTIVE:





 Vital Signs











 Period  Temp  Pulse  Resp  BP Sys/Diaz  Pulse Ox


 


 Last 24 Hr  97.6 F-98.9 F  81-90  18-30  141-158/75-98  











GENERAL: The patient is awake, alert, and fully oriented, in no acute distress. 


HEAD: Normal with no signs of trauma.


EYES: PERRL, extraocular movements intact, sclera anicteric, conjunctiva clear. 

No ptosis. 


ENT: Ears normal, nares patent, oropharynx clear without exudates, moist mucous 

membranes.


NECK: Trachea midline, full range of motion, supple. 


LUNGS: Lungs diffuse expiratory wheezing


HEART: Regular rate and rhythm, S1, S2 without murmur, rub or gallop.


ABDOMEN: Soft, nontender, nondistended, normoactive bowel sounds, no guarding, 

no rebound, no hepatosplenomegaly, no masses.


EXTREMITIES: 2+ pulses, warm, well-perfused, no edema. 


NEUROLOGICAL: Cranial nerves II through XII grossly intact. Normal speech, gait 

not observed.


PSYCH: Normal mood, normal affect.


SKIN: Warm, dry, normal turgor, no rashes or lesions noted














 Laboratory Results - last 24 hr











  03/07/19 03/07/19 03/07/19





  17:15 17:15 17:15


 


WBC  6.8  


 


RBC  4.64  


 


Hgb  13.2  


 


Hct  41.4  


 


MCV  89.3  


 


MCH  28.5  


 


MCHC  32.0  


 


RDW  16.9 H  


 


Plt Count  266  


 


MPV  8.2  


 


Absolute Neuts (auto)  4.2  


 


Neutrophils %  62.0  


 


Lymphocytes %  22.0  


 


Monocytes %  9.2  


 


Eosinophils %  6.0 H  


 


Basophils %  0.8  


 


Nucleated RBC %  0  


 


PT with INR   12.40 


 


INR   1.05 


 


Anticoagulation Therapy   


 


Puncture Site   


 


ABG pH   


 


ABG pCO2 at Pt Temp   


 


ABG pO2 at Pt Temp   


 


ABG HCO3   


 


ABG O2 Sat (Measured)   


 


ABG O2 Content   


 


ABG Base Excess   


 


Vito Test   


 


VBG pH   


 


POC VBG pCO2   


 


POC VBG pO2   


 


VBG HCO3   


 


VBG O2 Sat (Yared)   


 


VBG Base Excess   


 


Carboxyhemoglobin   


 


Methemoglobin   


 


O2 Delivery Device   


 


Oxygen Flow Rate   


 


Vent Mode   


 


Vent Rate   


 


Mechanical Rate   


 


Pressure Support Vent   


 


Sodium    142


 


Potassium    4.2


 


Chloride    102


 


Carbon Dioxide    35 H


 


Anion Gap    5 L


 


BUN    10


 


Creatinine    1.1


 


Creat Clearance w eGFR    > 60


 


POC Glucometer   


 


Random Glucose    105


 


Hemoglobin A1c %   


 


Calcium    8.8


 


Magnesium    1.9


 


Total Bilirubin    0.2


 


AST    13 L


 


ALT    19


 


Alkaline Phosphatase    98


 


Creatine Kinase    404 H


 


Creatine Kinase Index    0.8


 


CK-MB (CK-2)    3.5


 


Troponin I    < 0.02


 


B-Natriuretic Peptide    296.8 H


 


Total Protein    8.0


 


Albumin    3.7


 


Triglycerides   


 


Cholesterol   


 


Total LDL Cholesterol   


 


HDL Cholesterol   














  03/07/19 03/07/19 03/08/19





  18:34 19:30 06:00


 


WBC    6.1


 


RBC    4.63


 


Hgb    13.3


 


Hct    41.8


 


MCV    90.2


 


MCH    28.7


 


MCHC    31.9 L


 


RDW    16.6 H


 


Plt Count    272


 


MPV    8.6


 


Absolute Neuts (auto)    5.4


 


Neutrophils %    88.1 H D


 


Lymphocytes %    10.4  D


 


Monocytes %    1.3 L D


 


Eosinophils %    0.0  D


 


Basophils %    0.2


 


Nucleated RBC %    0


 


PT with INR   


 


INR   


 


Anticoagulation Therapy   No Result Required. 


 


Puncture Site   Left radial 


 


ABG pH   7.33 L 


 


ABG pCO2 at Pt Temp   68.2 H* 


 


ABG pO2 at Pt Temp   60.3 L 


 


ABG HCO3   35.3 H 


 


ABG O2 Sat (Measured)   87.5 L 


 


ABG O2 Content   15.0 


 


ABG Base Excess   7.7 H 


 


Vito Test   Positive 


 


VBG pH  7.34  


 


POC VBG pCO2  64.8 H*  


 


POC VBG pO2  130.0 H  


 


VBG HCO3  34.1 L*  


 


VBG O2 Sat (Yared)  98.4 H*  


 


VBG Base Excess  6.8 H  


 


Carboxyhemoglobin   1.0 


 


Methemoglobin   0.1 L 


 


O2 Delivery Device   Nasal 


 


Oxygen Flow Rate   2l 


 


Vent Mode   No Result Required. 


 


Vent Rate   No Result Required. 


 


Mechanical Rate   No Result Required. 


 


Pressure Support Vent   No Result Required. 


 


Sodium   


 


Potassium   


 


Chloride   


 


Carbon Dioxide   


 


Anion Gap   


 


BUN   


 


Creatinine   


 


Creat Clearance w eGFR   


 


POC Glucometer   


 


Random Glucose   


 


Hemoglobin A1c %   


 


Calcium   


 


Magnesium   


 


Total Bilirubin   


 


AST   


 


ALT   


 


Alkaline Phosphatase   


 


Creatine Kinase   


 


Creatine Kinase Index   


 


CK-MB (CK-2)   


 


Troponin I   


 


B-Natriuretic Peptide   


 


Total Protein   


 


Albumin   


 


Triglycerides   


 


Cholesterol   


 


Total LDL Cholesterol   


 


HDL Cholesterol   














  03/08/19 03/08/19 03/08/19





  06:00 06:00 10:16


 


WBC   


 


RBC   


 


Hgb   


 


Hct   


 


MCV   


 


MCH   


 


MCHC   


 


RDW   


 


Plt Count   


 


MPV   


 


Absolute Neuts (auto)   


 


Neutrophils %   


 


Lymphocytes %   


 


Monocytes %   


 


Eosinophils %   


 


Basophils %   


 


Nucleated RBC %   


 


PT with INR   


 


INR   


 


Anticoagulation Therapy   


 


Puncture Site    Right radial


 


ABG pH    7.40


 


ABG pCO2 at Pt Temp    62.1 H*


 


ABG pO2 at Pt Temp    58.5 L


 


ABG HCO3    37.8 H


 


ABG O2 Sat (Measured)    88.6 L


 


ABG O2 Content    15.7


 


ABG Base Excess    11.0 H


 


Vito Test    Positive


 


VBG pH   


 


POC VBG pCO2   


 


POC VBG pO2   


 


VBG HCO3   


 


VBG O2 Sat (Yared)   


 


VBG Base Excess   


 


Carboxyhemoglobin   


 


Methemoglobin   


 


O2 Delivery Device   


 


Oxygen Flow Rate    Yes


 


Vent Mode   


 


Vent Rate   


 


Mechanical Rate   


 


Pressure Support Vent   


 


Sodium  138  


 


Potassium  5.0  


 


Chloride  98  


 


Carbon Dioxide  39 H  


 


Anion Gap  2 L  


 


BUN  13  


 


Creatinine  0.9  


 


Creat Clearance w eGFR  > 60  


 


POC Glucometer   


 


Random Glucose  124 H  


 


Hemoglobin A1c %   6.3 


 


Calcium  9.1  


 


Magnesium  2.0  


 


Total Bilirubin   


 


AST   


 


ALT   


 


Alkaline Phosphatase   


 


Creatine Kinase   


 


Creatine Kinase Index   


 


CK-MB (CK-2)   


 


Troponin I  < 0.02  


 


B-Natriuretic Peptide   


 


Total Protein   


 


Albumin   


 


Triglycerides  51  


 


Cholesterol  149  


 


Total LDL Cholesterol  80  


 


HDL Cholesterol  58  














  03/08/19 03/08/19





  11:38 11:45


 


WBC  


 


RBC  


 


Hgb  


 


Hct  


 


MCV  


 


MCH  


 


MCHC  


 


RDW  


 


Plt Count  


 


MPV  


 


Absolute Neuts (auto)  


 


Neutrophils %  


 


Lymphocytes %  


 


Monocytes %  


 


Eosinophils %  


 


Basophils %  


 


Nucleated RBC %  


 


PT with INR  


 


INR  


 


Anticoagulation Therapy  


 


Puncture Site  


 


ABG pH  


 


ABG pCO2 at Pt Temp  


 


ABG pO2 at Pt Temp  


 


ABG HCO3  


 


ABG O2 Sat (Measured)  


 


ABG O2 Content  


 


ABG Base Excess  


 


Vito Test  


 


VBG pH  


 


POC VBG pCO2  


 


POC VBG pO2  


 


VBG HCO3  


 


VBG O2 Sat (Yared)  


 


VBG Base Excess  


 


Carboxyhemoglobin  


 


Methemoglobin  


 


O2 Delivery Device  


 


Oxygen Flow Rate  


 


Vent Mode  


 


Vent Rate  


 


Mechanical Rate  


 


Pressure Support Vent  


 


Sodium  


 


Potassium  


 


Chloride  


 


Carbon Dioxide  


 


Anion Gap  


 


BUN  


 


Creatinine  


 


Creat Clearance w eGFR  


 


POC Glucometer  137 


 


Random Glucose  


 


Hemoglobin A1c %  


 


Calcium  


 


Magnesium  


 


Total Bilirubin  


 


AST  


 


ALT  


 


Alkaline Phosphatase  


 


Creatine Kinase   262


 


Creatine Kinase Index  


 


CK-MB (CK-2)  


 


Troponin I   0.03


 


B-Natriuretic Peptide  


 


Total Protein  


 


Albumin  


 


Triglycerides  


 


Cholesterol  


 


Total LDL Cholesterol  


 


HDL Cholesterol  








Active Medications











Generic Name Dose Route Start Last Admin





  Trade Name Freq  PRN Reason Stop Dose Admin


 


Albuterol Sulfate  1 amp  03/08/19 07:17  





  Ventolin 0.083% Nebulizer Soln -  NEB   





  Q4H PRN   





  SHORT OF BREATH/WHEEZING   





     





     





     


 


Albuterol/Ipratropium  1 amp  03/08/19 12:00  03/08/19 11:40





  Duoneb -  NEB   1 amp





  RQID KAPIL   Administration





     





     





     





     


 


Atorvastatin Calcium  80 mg  03/08/19 22:00  





  Lipitor -  PO   





  [facility]   





     





     





     





     


 


Azithromycin  500 mg  03/08/19 10:00  03/08/19 10:08





  Zithromax -  PO   500 mg





  DAILY KAPIL   Administration





     





     





     





     


 


Enoxaparin Sodium  40 mg  03/08/19 10:00  03/08/19 11:31





  Lovenox -  SQ   40 mg





  DAILY KAPIL   Administration





     





     





     





     


 


Ferrous Sulfate  325 mg  03/08/19 11:15  





  Feosol -  PO   





  DA[facility]   





     





     





     





     


 


Furosemide  40 mg  03/09/19 10:00  





  Lasix -  PO   





  DA[facility]   





     





     





     





     


 


Ceftriaxone Sodium 1 gm/  50 mls @ 100 mls/hr  03/08/19 12:45  





  Dextrose  IVPB   





  DA[facility]   





     





     





  Protocol   





     


 


Insulin Aspart  0 vial  03/08/19 11:00  03/08/19 11:40





  Novolog Vial Sliding Scale -  SQ   Not Given





  [facility]   





     





     





  Protocol   





     


 


Lisinopril  10 mg  03/08/19 10:00  03/08/19 10:08





  Prinivil  PO   10 mg





  [facility]   Administration





     





     





     





     


 


Methylprednisolone Sodium Succinate  60 mg  03/08/19 09:00  03/08/19 11:31





  Solu-Medrol -  IVPUSH   60 mg





  Q6H-IV KAPIL   Administration





     





     





     





     


 


Nicotine  14 mg  03/08/19 19:48  





  Nicoderm Patch -  TD  03/08/19 19:49  





  ONCE ONE   





     





     





     





     


 


Nicotine  21 mg  03/08/19 22:00  





  Nicoderm Patch -  TD   





  [facility]   





     





     





     





     


 


Potassium Chloride  10 meq  03/08/19 10:00  03/08/19 10:08





  K-Dur -  PO   10 meq





  DAILY [facility]   Administration





     





     





     





     


 


Ranitidine HCl  300 mg  03/08/19 11:15  





  Zantac -  PO   





  DAILY [facility]   





     





     





     





     











ASSESSMENT/PLAN:


64yo M with PMH CHF?, HTN, asthma and COPD presented to the ER wtih 

progressively worsening SOB x2 weeks and found to be in hypercapnic respiratory 

failure. meds have been reconciled





#Acute hypercapneic respiratory failure 2/2 COPD exacerbation - was on bipap 

overnight and now on 2L NC saturating 95%. rpt ABG w/ nl pH and improving CO2, 

pt likely at baseline now.


-Bipap prn


-smoking cessation counseling 


-nicotine patch ordered 


-duonebs q6h 


-albuterol neb q4hrs prn


-methylprednisone 60mg IV q6hrs 


-pulmonary consult- Dr. Villegas 


-f/u CT chest





#CHF  - unknown if systolic vs diastolic. does not appear to be volume 

overloaded at this time. BNP nl  


-telemetry 


-s/p lasix 40mg IV in ED


-dc metoprolol 25mg po bid 


-lisinopril 10mg po daily 


-c/w home dose lasix 40 po


-cardiology consult 


-f/u echo 


-salt restriction 


-fluid restriction 


-troponin neg x3


-home potassium supplement 


-A1c 6.2 





#HTN 


-lisinopril as above, titrate dose accordingly 





HLD - has high ASCVD score 


-home dose lipitor 


-lipid panel nl





#DVT ppx 


-lovenox sq 40





FEN


PO hydration


replete prn


diet - low Na





dispo


tele





Visit type





- Emergency Visit


Emergency Visit: Yes


ED Registration Date: 03/07/19


Care time: The patient presented to the Emergency Department on the above date 

and was hospitalized for further evaluation of their emergent condition.





- New Patient


This patient is new to me today: Yes


Date on this admission: 03/08/19





- Critical Care


Critical Care patient: No

## 2019-03-09 LAB
ALBUMIN SERPL-MCNC: 3.5 G/DL (ref 3.4–5)
ALP SERPL-CCNC: 91 U/L (ref 45–117)
ALT SERPL-CCNC: 16 U/L (ref 13–61)
ANION GAP SERPL CALC-SCNC: 6 MMOL/L (ref 8–16)
AST SERPL-CCNC: 5 U/L (ref 15–37)
BASOPHILS # BLD: 0.2 % (ref 0–2)
BILIRUB SERPL-MCNC: 0.1 MG/DL (ref 0.2–1)
BUN SERPL-MCNC: 18 MG/DL (ref 7–18)
CALCIUM SERPL-MCNC: 9.5 MG/DL (ref 8.5–10.1)
CHLORIDE SERPL-SCNC: 98 MMOL/L (ref 98–107)
CO2 SERPL-SCNC: 36 MMOL/L (ref 21–32)
CREAT SERPL-MCNC: 1 MG/DL (ref 0.55–1.3)
DEPRECATED RDW RBC AUTO: 16.5 % (ref 11.9–15.9)
EOSINOPHIL # BLD: 0 % (ref 0–4.5)
GLUCOSE SERPL-MCNC: 133 MG/DL (ref 74–106)
HCT VFR BLD CALC: 40.1 % (ref 35.4–49)
HGB BLD-MCNC: 12.7 GM/DL (ref 11.7–16.9)
LYMPHOCYTES # BLD: 9.6 % (ref 8–40)
MAGNESIUM SERPL-MCNC: 2.1 MG/DL (ref 1.8–2.4)
MCH RBC QN AUTO: 28 PG (ref 25.7–33.7)
MCHC RBC AUTO-ENTMCNC: 31.6 G/DL (ref 32–35.9)
MCV RBC: 88.5 FL (ref 80–96)
MONOCYTES # BLD AUTO: 2.2 % (ref 3.8–10.2)
NEUTROPHILS # BLD: 88 % (ref 42.8–82.8)
PLATELET # BLD AUTO: 301 K/MM3 (ref 134–434)
PMV BLD: 8.5 FL (ref 7.5–11.1)
POTASSIUM SERPLBLD-SCNC: 4.7 MMOL/L (ref 3.5–5.1)
PROT SERPL-MCNC: 7.9 G/DL (ref 6.4–8.2)
RBC # BLD AUTO: 4.53 M/MM3 (ref 4–5.6)
SODIUM SERPL-SCNC: 140 MMOL/L (ref 136–145)
WBC # BLD AUTO: 10.5 K/MM3 (ref 4–10)

## 2019-03-09 RX ADMIN — IPRATROPIUM BROMIDE AND ALBUTEROL SULFATE SCH AMP: .5; 3 SOLUTION RESPIRATORY (INHALATION) at 11:53

## 2019-03-09 RX ADMIN — CEFTRIAXONE SCH MLS/HR: 1 INJECTION, POWDER, FOR SOLUTION INTRAMUSCULAR; INTRAVENOUS at 09:19

## 2019-03-09 RX ADMIN — RANITIDINE SCH MG: 150 TABLET ORAL at 09:19

## 2019-03-09 RX ADMIN — METHYLPREDNISOLONE SODIUM SUCCINATE SCH MG: 40 INJECTION, POWDER, FOR SOLUTION INTRAMUSCULAR; INTRAVENOUS at 14:03

## 2019-03-09 RX ADMIN — INSULIN ASPART SCH UNITS: 100 INJECTION, SOLUTION INTRAVENOUS; SUBCUTANEOUS at 06:02

## 2019-03-09 RX ADMIN — POTASSIUM CHLORIDE SCH MEQ: 750 TABLET, EXTENDED RELEASE ORAL at 09:20

## 2019-03-09 RX ADMIN — GUAIFENESIN PRN ML: 100 SOLUTION ORAL at 15:56

## 2019-03-09 RX ADMIN — IPRATROPIUM BROMIDE AND ALBUTEROL SULFATE SCH AMP: .5; 3 SOLUTION RESPIRATORY (INHALATION) at 08:00

## 2019-03-09 RX ADMIN — AZITHROMYCIN SCH MG: 250 TABLET, FILM COATED ORAL at 09:18

## 2019-03-09 RX ADMIN — INSULIN ASPART SCH UNITS: 100 INJECTION, SOLUTION INTRAVENOUS; SUBCUTANEOUS at 16:40

## 2019-03-09 RX ADMIN — ATORVASTATIN CALCIUM SCH MG: 80 TABLET, FILM COATED ORAL at 21:00

## 2019-03-09 RX ADMIN — GUAIFENESIN PRN ML: 100 SOLUTION ORAL at 09:19

## 2019-03-09 RX ADMIN — METHYLPREDNISOLONE SODIUM SUCCINATE SCH MG: 40 INJECTION, POWDER, FOR SOLUTION INTRAMUSCULAR; INTRAVENOUS at 20:53

## 2019-03-09 RX ADMIN — INSULIN ASPART SCH: 100 INJECTION, SOLUTION INTRAVENOUS; SUBCUTANEOUS at 12:05

## 2019-03-09 RX ADMIN — ENOXAPARIN SODIUM SCH MG: 40 INJECTION SUBCUTANEOUS at 09:20

## 2019-03-09 RX ADMIN — FERROUS SULFATE TAB EC 324 MG (65 MG FE EQUIVALENT) SCH MG: 324 (65 FE) TABLET DELAYED RESPONSE at 09:18

## 2019-03-09 RX ADMIN — METHYLPREDNISOLONE SODIUM SUCCINATE SCH MG: 40 INJECTION, POWDER, FOR SOLUTION INTRAMUSCULAR; INTRAVENOUS at 09:19

## 2019-03-09 RX ADMIN — ALBUTEROL SULFATE PRN AMP: 2.5 SOLUTION RESPIRATORY (INHALATION) at 05:30

## 2019-03-09 RX ADMIN — INSULIN ASPART SCH UNITS: 100 INJECTION, SOLUTION INTRAVENOUS; SUBCUTANEOUS at 21:00

## 2019-03-09 RX ADMIN — LISINOPRIL SCH MG: 10 TABLET ORAL at 09:19

## 2019-03-09 RX ADMIN — FUROSEMIDE SCH MG: 10 INJECTION, SOLUTION INTRAVENOUS at 13:49

## 2019-03-09 RX ADMIN — IPRATROPIUM BROMIDE AND ALBUTEROL SULFATE SCH AMP: .5; 3 SOLUTION RESPIRATORY (INHALATION) at 20:40

## 2019-03-09 RX ADMIN — NICOTINE SCH MG: 21 PATCH TRANSDERMAL at 20:59

## 2019-03-09 RX ADMIN — METHYLPREDNISOLONE SODIUM SUCCINATE SCH MG: 40 INJECTION, POWDER, FOR SOLUTION INTRAMUSCULAR; INTRAVENOUS at 02:58

## 2019-03-09 RX ADMIN — IPRATROPIUM BROMIDE AND ALBUTEROL SULFATE SCH AMP: .5; 3 SOLUTION RESPIRATORY (INHALATION) at 15:20

## 2019-03-09 NOTE — PN
Progress Note, Physician





- Current Medication List


Current Medications: 


Active Medications





Albuterol Sulfate (Ventolin 0.083% Nebulizer Soln -)  1 amp NEB Q4H PRN


   PRN Reason: SHORT OF BREATH/WHEEZING


   Last Admin: 03/09/19 05:30 Dose:  1 amp


Albuterol/Ipratropium (Duoneb -)  1 amp NEB RQID [facility]


   Last Admin: 03/08/19 20:00 Dose:  1 amp


Atorvastatin Calcium (Lipitor -)  80 mg PO HS [facility]


   Last Admin: 03/08/19 22:45 Dose:  80 mg


Azithromycin (Zithromax -)  500 mg PO DAILY [facility]


   Last Admin: 03/08/19 10:08 Dose:  500 mg


Enoxaparin Sodium (Lovenox -)  40 mg SQ DAILY [facility]


   Last Admin: 03/08/19 11:31 Dose:  40 mg


Ferrous Sulfate (Feosol -)  325 mg PO DAILY [facility]


   Last Admin: 03/08/19 13:31 Dose:  325 mg


Furosemide (Lasix -)  40 mg PO DAILY [facility]


Guaifenesin (Robitussin -)  10 ml PO Q4H PRN


   PRN Reason: COUGH


Ceftriaxone Sodium 1 gm/ (Dextrose)  50 mls @ 100 mls/hr IVPB DAILY [facility]; 

Protocol


   Last Admin: 03/08/19 13:32 Dose:  100 mls/hr


Insulin Aspart (Novolog Vial Sliding Scale -)  0 vial SQ ACHS [facility]; Protocol


   Last Admin: 03/09/19 06:02 Dose:  2 units


Lisinopril (Prinivil)  10 mg PO DAILY [facility]


   Last Admin: 03/08/19 10:08 Dose:  10 mg


Methylprednisolone Sodium Succinate (Solu-Medrol -)  60 mg IVPUSH Q6H-IV [facility]


   Last Admin: 03/09/19 02:58 Dose:  60 mg


Nicotine (Nicoderm Patch -)  21 mg TD HS [facility]


   Last Admin: 03/08/19 22:45 Dose:  21 mg


Potassium Chloride (K-Dur -)  10 meq PO DAILY [facility]


   Last Admin: 03/08/19 10:08 Dose:  10 meq


Ranitidine HCl (Zantac -)  300 mg PO DAILY [facility]


   Last Admin: 03/08/19 13:31 Dose:  300 mg











- Objective


Vital Signs: 


 Vital Signs











Temperature  97.5 F L  03/09/19 06:00


 


Pulse Rate  89   03/09/19 06:00


 


Respiratory Rate  20   03/09/19 06:00


 


Blood Pressure  155/80   03/09/19 06:00


 


O2 Sat by Pulse Oximetry (%)  97   03/09/19 05:29











Cardiovascular: Yes: S1, S2


Respiratory: Yes: On Nasal O2, Rales


Gastrointestinal: Yes: Normal Bowel Sounds, Soft


Edema: Yes


Labs: 


 CBC, BMP





 03/09/19 05:15 





 INR, PTT











INR  1.05  (0.83-1.09)   03/07/19  17:15    














Problem List





- Problems


(1) CHF (congestive heart failure)


Assessment/Plan: 


-IV LASIX


-CARDIO ON BOARD


-FOLLOW LABS


-EF 55%


Code(s): I50.9 - HEART FAILURE, UNSPECIFIED   





(2) COPD exacerbation


Assessment/Plan: 


-IV STEROIDS AND ABX


-NEBS


-PULM ON BOARD


CT NOTED--PULM NODULE


Code(s): J44.1 - CHRONIC OBSTRUCTIVE PULMONARY DISEASE W (ACUTE) EXACERBATION   





(3) HTN (hypertension)


Code(s): I10 - ESSENTIAL (PRIMARY) HYPERTENSION

## 2019-03-09 NOTE — PN
Progress Note, Physician


Chief Complaint: 





Cardiology follow up 


Feels better


Telem NSR





History of Present Illness: 





63 year old male with a long smoking history and COPD. He has been complaining 

of worsening SOB fir the past 2 weeks. He also has orthopnea. 


EKG NSR at 81 BPM with normal intervals, normal axis, and NSSTTW changes.


Troponin negative





Echocardiogram 3/9/19


Normal LV function


EF 50 - 55%


mild MR


Mild TR














- Current Medication List


Current Medications: 


Active Medications





Albuterol Sulfate (Ventolin 0.083% Nebulizer Soln -)  1 amp NEB Q4H PRN


   PRN Reason: SHORT OF BREATH/WHEEZING


   Last Admin: 03/09/19 05:30 Dose:  1 amp


Albuterol/Ipratropium (Duoneb -)  1 amp NEB RQID [facility]


   Last Admin: 03/09/19 11:53 Dose:  1 amp


Atorvastatin Calcium (Lipitor -)  80 mg PO HS [facility]


   Last Admin: 03/08/19 22:45 Dose:  80 mg


Azithromycin (Zithromax -)  500 mg PO DAILY [facility]


   Last Admin: 03/09/19 09:18 Dose:  500 mg


Enoxaparin Sodium (Lovenox -)  40 mg SQ DAILY [facility]


   Last Admin: 03/09/19 09:20 Dose:  40 mg


Ferrous Sulfate (Feosol -)  325 mg PO DAILY [facility]


   Last Admin: 03/09/19 09:18 Dose:  325 mg


Furosemide (Lasix Injection -)  40 mg IVPUSH BID@0600,1400 [facility]


   Last Admin: 03/09/19 13:49 Dose:  40 mg


Guaifenesin (Robitussin -)  10 ml PO Q4H PRN


   PRN Reason: COUGH


   Last Admin: 03/09/19 09:19 Dose:  10 ml


Ceftriaxone Sodium 1 gm/ (Dextrose)  50 mls @ 100 mls/hr IVPB DAILY [facility]; 

Protocol


   Last Admin: 03/09/19 09:19 Dose:  100 mls/hr


Insulin Aspart (Novolog Vial Sliding Scale -)  0 vial SQ ACHS [facility]; Protocol


   Last Admin: 03/09/19 12:05 Dose:  Not Given


Lisinopril (Prinivil)  10 mg PO DAILY [facility]


   Last Admin: 03/09/19 09:19 Dose:  10 mg


Methylprednisolone Sodium Succinate (Solu-Medrol -)  60 mg IVPUSH Q6H-IV [facility]


   Last Admin: 03/09/19 14:03 Dose:  60 mg


Nicotine (Nicoderm Patch -)  21 mg TD HS [facility]


   Last Admin: 03/08/19 22:45 Dose:  21 mg


Potassium Chloride (K-Dur -)  10 meq PO DAILY [facility]


   Last Admin: 03/09/19 09:20 Dose:  10 meq


Ranitidine HCl (Zantac -)  300 mg PO DAILY [facility]


   Last Admin: 03/09/19 09:19 Dose:  300 mg











- Objective


Vital Signs: 


 Vital Signs











Temperature  97.9 F   03/09/19 13:15


 


Pulse Rate  88   03/09/19 13:15


 


Respiratory Rate  18   03/09/19 13:15


 


Blood Pressure  153/93   03/09/19 13:15


 


O2 Sat by Pulse Oximetry (%)  92 L  03/09/19 11:58











Constitutional: Yes: Well Nourished, No Distress


Eyes: Yes: Conjunctiva Clear, EOM Intact


HENT: Yes: Atraumatic, Normocephalic


Neck: Yes: Trachea Midline


Cardiovascular: Yes: Regular Rate and Rhythm, S1, S2.  No: JVD


Respiratory: Yes: Wheezes


Gastrointestinal: Yes: Normal Bowel Sounds


Edema: No


Labs: 


 CBC, BMP





 03/09/19 05:15 





 03/09/19 05:15 





 INR, PTT











INR  1.05  (0.83-1.09)   03/07/19  17:15    














Problem List





- Problems


(1) Acute on chronic respiratory failure with hypoxia and hypercapnia


Code(s): J96.21 - ACUTE AND CHRONIC RESPIRATORY FAILURE WITH HYPOXIA; J96.22 - 

ACUTE AND CHRONIC RESPIRATORY FAILURE WITH HYPERCAPNIA   





(2) CHF (congestive heart failure)


Code(s): I50.9 - HEART FAILURE, UNSPECIFIED   





Assessment/Plan





63 year old male with a long smoking history and COPD. He has been complaining 

of worsening SOB fir the past 2 weeks.  


EKG NSR at 81 BPM with normal intervals, normal axis, and NSSTTW changes.


Troponin negative





Echocardiogram 3/9/19


Normal LV function


EF 50 - 55%


mild MR


Mild TR





COPD exacerbation


Improved HFpEF


Transition to lasix 40mg qd PO tomorrow

## 2019-03-09 NOTE — PN
Progress Note, Physician


History of Present Illness: 





PULMONARY





ALERT,STILL CONGESTED ON NASAL O2 SAT 95%





- Current Medication List


Current Medications: 


Active Medications





Albuterol Sulfate (Ventolin 0.083% Nebulizer Soln -)  1 amp NEB Q4H PRN


   PRN Reason: SHORT OF BREATH/WHEEZING


   Last Admin: 03/09/19 05:30 Dose:  1 amp


Albuterol/Ipratropium (Duoneb -)  [facility] amp [facility]B [facility]


   Last Admin: 03/09/19 08:00 Dose:  1 amp


Atorvastatin Calcium (Lipitor -)  80 mg PO HS [facility]


   Last Admin: 03/08/19 22:45 Dose:  80 mg


Azithromycin (Zithromax -)  500 mg PO DAILY [facility]


   Last Admin: 03/09/19 09:18 Dose:  500 mg


Enoxaparin Sodium (Lovenox -)  40 mg SQ DAILY [facility]


   Last Admin: 03/09/19 09:20 Dose:  40 mg


Ferrous Sulfate (Feosol -)  325 mg PO DAILY [facility]


   Last Admin: 03/09/19 09:18 Dose:  325 mg


Furosemide (Lasix -)  40 mg PO DAILY [facility]


   Last Admin: 03/09/19 09:19 Dose:  40 mg


Guaifenesin (Robitussin -)  10 ml PO Q4H PRN


   PRN Reason: COUGH


   Last Admin: 03/09/19 09:19 Dose:  10 ml


Ceftriaxone Sodium 1 gm/ (Dextrose)  50 mls @ 100 mls/hr IVPB DAILY [facility]; 

Protocol


   Last Admin: 03/09/19 09:19 Dose:  100 mls/hr


Insulin Aspart (Novolog Vial Sliding Scale -)  0 vial SQ ACHS [facility]; Protocol


   Last Admin: 03/09/19 06:02 Dose:  2 units


Lisinopril (Prinivil)  10 mg PO DAILY [facility]


   Last Admin: 03/09/19 09:19 Dose:  10 mg


Methylprednisolone Sodium Succinate (Solu-Medrol -)  60 mg IVPUSH Q6H-IV [facility]


   Last Admin: 03/09/19 09:19 Dose:  60 mg


Nicotine (Nicoderm Patch -)  21 mg TD HS [facility]


   Last Admin: 03/08/19 22:45 Dose:  21 mg


Potassium Chloride (K-Dur -)  10 meq PO DAILY [facility]


   Last Admin: 03/09/19 09:20 Dose:  10 meq


Ranitidine HCl (Zantac -)  300 mg PO DAILY [facility]


   Last Admin: 03/09/19 09:19 Dose:  300 mg











- Objective


Vital Signs: 


 Vital Signs











Temperature  98.4 F   03/09/19 10:00


 


Pulse Rate  87   03/09/19 10:00


 


Respiratory Rate  20   03/09/19 10:00


 


Blood Pressure  139/80   03/09/19 10:00


 


O2 Sat by Pulse Oximetry (%)  91 L  03/09/19 09:00











Constitutional: Yes: Well Nourished, Calm


Eyes: Yes: WNL


HENT: Yes: WNL


Neck: Yes: WNL


Cardiovascular: Yes: Regular Rate and Rhythm, S1, S2


Respiratory: Yes: Wheezes (DIFFUSE MARIELA WHEZES AND RHONCHI)


Gastrointestinal: Yes: Normal Bowel Sounds, Soft


Extremities: Yes: WNL


Edema: No


Labs: 


 CBC, BMP





 03/09/19 05:15 





 03/09/19 05:15 





 INR, PTT











INR  1.05  (0.83-1.09)   03/07/19  17:15    














Problem List





- Problems


(1) Acute on chronic respiratory failure with hypoxia and hypercapnia


Code(s): J96.21 - ACUTE AND CHRONIC RESPIRATORY FAILURE WITH HYPOXIA; J96.22 - 

ACUTE AND CHRONIC RESPIRATORY FAILURE WITH HYPERCAPNIA   





(2) SOB (shortness of breath)


Code(s): R06.02 - SHORTNESS OF BREATH   





(3) COPD exacerbation


Code(s): J44.1 - CHRONIC OBSTRUCTIVE PULMONARY DISEASE W (ACUTE) EXACERBATION   





(4) HTN (hypertension)


Code(s): I10 - ESSENTIAL (PRIMARY) HYPERTENSION   





(5) Tobacco abuse


Code(s): Z72.0 - TOBACCO USE   





(6) Tobacco abuse counseling


Code(s): Z71.6 - TOBACCO ABUSE COUNSELING   





Assessment/Plan








IMP ACUTE ON CHRONIC HYPOXEMIC/HYPERCAPNEIC RESPIRATORY FAILURE


      ADVANCED COPD WITH ACUTE EXACERBATION


      HTN


      ? H/O CHF


      TOBACCO ABUSE


       LIKELY WILSON


      





PLAN IV STEROIDS SAME DOSE


        INHALED BRONCHODILATORS


        SUPPLEMENTAL O2


        NIPPV AS NEEDED


        LASIX AS PER CARDIOLOGY


        AMBULATORY O2 SAT PRIOR TO DISCHARGE


        








DR SMITH


   


      


       


  





 Problem List 





- Problems


(1) Acute on chronic respiratory failure with hypoxia and hypercapnia


Code(s): J96.21 - ACUTE AND CHRONIC RESPIRATORY FAILURE WITH HYPOXIA; J96.22 - 

ACUTE AND CHRONIC RESPIRATORY FAILURE WITH HYPERCAPNIA   





(2) SOB (shortness of breath)


Code(s): R06.02 - SHORTNESS OF BREATH   





(3) COPD exacerbation


Code(s): J44.1 - CHRONIC OBSTRUCTIVE PULMONARY DISEASE W (ACUTE) EXACERBATION   





(4) HTN (hypertension)


Code(s): I10 - ESSENTIAL (PRIMARY) HYPERTENSION   





(5) Tobacco abuse


Code(s): Z72.0 - TOBACCO USE   





(6) Tobacco abuse counseling


Code(s): Z71.6 - TOBACCO ABUSE COUNSELING

## 2019-03-10 RX ADMIN — GUAIFENESIN PRN ML: 100 SOLUTION ORAL at 17:32

## 2019-03-10 RX ADMIN — CEFTRIAXONE SCH MLS/HR: 1 INJECTION, POWDER, FOR SOLUTION INTRAMUSCULAR; INTRAVENOUS at 09:25

## 2019-03-10 RX ADMIN — METHYLPREDNISOLONE SODIUM SUCCINATE SCH MG: 40 INJECTION, POWDER, FOR SOLUTION INTRAMUSCULAR; INTRAVENOUS at 03:31

## 2019-03-10 RX ADMIN — AZITHROMYCIN SCH MG: 250 TABLET, FILM COATED ORAL at 09:23

## 2019-03-10 RX ADMIN — INSULIN ASPART SCH: 100 INJECTION, SOLUTION INTRAVENOUS; SUBCUTANEOUS at 06:07

## 2019-03-10 RX ADMIN — FERROUS SULFATE TAB EC 324 MG (65 MG FE EQUIVALENT) SCH MG: 324 (65 FE) TABLET DELAYED RESPONSE at 09:24

## 2019-03-10 RX ADMIN — GUAIFENESIN PRN ML: 100 SOLUTION ORAL at 06:13

## 2019-03-10 RX ADMIN — FUROSEMIDE SCH MG: 10 INJECTION, SOLUTION INTRAVENOUS at 05:29

## 2019-03-10 RX ADMIN — METHYLPREDNISOLONE SODIUM SUCCINATE SCH MG: 40 INJECTION, POWDER, FOR SOLUTION INTRAMUSCULAR; INTRAVENOUS at 17:32

## 2019-03-10 RX ADMIN — GUAIFENESIN PRN ML: 100 SOLUTION ORAL at 21:16

## 2019-03-10 RX ADMIN — NICOTINE SCH MG: 21 PATCH TRANSDERMAL at 21:17

## 2019-03-10 RX ADMIN — LISINOPRIL SCH MG: 10 TABLET ORAL at 09:25

## 2019-03-10 RX ADMIN — POTASSIUM CHLORIDE SCH MEQ: 750 TABLET, EXTENDED RELEASE ORAL at 09:23

## 2019-03-10 RX ADMIN — ATORVASTATIN CALCIUM SCH MG: 80 TABLET, FILM COATED ORAL at 21:16

## 2019-03-10 RX ADMIN — INSULIN ASPART SCH UNITS: 100 INJECTION, SOLUTION INTRAVENOUS; SUBCUTANEOUS at 16:42

## 2019-03-10 RX ADMIN — ENOXAPARIN SODIUM SCH MG: 40 INJECTION SUBCUTANEOUS at 09:25

## 2019-03-10 RX ADMIN — IPRATROPIUM BROMIDE AND ALBUTEROL SULFATE SCH AMP: .5; 3 SOLUTION RESPIRATORY (INHALATION) at 16:14

## 2019-03-10 RX ADMIN — IPRATROPIUM BROMIDE AND ALBUTEROL SULFATE SCH AMP: .5; 3 SOLUTION RESPIRATORY (INHALATION) at 11:56

## 2019-03-10 RX ADMIN — GUAIFENESIN PRN ML: 100 SOLUTION ORAL at 11:39

## 2019-03-10 RX ADMIN — INSULIN ASPART SCH UNITS: 100 INJECTION, SOLUTION INTRAVENOUS; SUBCUTANEOUS at 21:16

## 2019-03-10 RX ADMIN — RANITIDINE SCH MG: 150 TABLET ORAL at 09:24

## 2019-03-10 RX ADMIN — INSULIN ASPART SCH: 100 INJECTION, SOLUTION INTRAVENOUS; SUBCUTANEOUS at 11:41

## 2019-03-10 RX ADMIN — IPRATROPIUM BROMIDE AND ALBUTEROL SULFATE SCH AMP: .5; 3 SOLUTION RESPIRATORY (INHALATION) at 08:13

## 2019-03-10 RX ADMIN — METHYLPREDNISOLONE SODIUM SUCCINATE SCH MG: 40 INJECTION, POWDER, FOR SOLUTION INTRAMUSCULAR; INTRAVENOUS at 09:21

## 2019-03-10 NOTE — PN
Progress Note, Physician


History of Present Illness: 





PULMONARY





ALERT,LESS DYSPNEIC





- Current Medication List


Current Medications: 


Active Medications





Albuterol Sulfate (Ventolin 0.083% Nebulizer Soln -)  1 amp NEB Q4H PRN


   PRN Reason: SHORT OF BREATH/WHEEZING


   Last Admin: 03/09/19 05:30 Dose:  1 amp


Albuterol/Ipratropium (Duoneb -)  1 amp NEB RQID [facility]


   Last Admin: 03/10/19 11:56 Dose:  1 amp


Atorvastatin Calcium (Lipitor -)  80 mg PO HS [facility]


   Last Admin: 03/09/19 21:00 Dose:  80 mg


Azithromycin (Zithromax -)  500 mg PO DAILY [facility]


   Last Admin: 03/10/19 09:23 Dose:  500 mg


Enoxaparin Sodium (Lovenox -)  40 mg SQ DAILY [facility]


   Last Admin: 03/10/19 09:25 Dose:  40 mg


Ferrous Sulfate (Feosol -)  325 mg PO DAILY [facility]


   Last Admin: 03/10/19 09:24 Dose:  325 mg


Furosemide (Lasix -)  40 mg PO DAILY [facility]


Guaifenesin (Robitussin -)  10 ml PO Q4H PRN


   PRN Reason: COUGH


   Last Admin: 03/10/19 11:39 Dose:  10 ml


Ceftriaxone Sodium 1 gm/ (Dextrose)  50 mls @ 100 mls/hr IVPB DAILY [facility]; 

Protocol


   Last Admin: 03/10/19 09:25 Dose:  100 mls/hr


Insulin Aspart (Novolog Vial Sliding Scale -)  0 vial SQ ACHS [facility]; Protocol


   Last Admin: 03/10/19 11:41 Dose:  Not Given


Lisinopril (Prinivil)  10 mg PO DAILY [facility]


   Last Admin: 03/10/19 09:25 Dose:  10 mg


Methylprednisolone Sodium Succinate (Solu-Medrol -)  60 mg IVPUSH Q8H-IV [facility]


Nicotine (Nicoderm Patch -)  21 mg TD HS [facility]


   Last Admin: 03/09/19 20:59 Dose:  21 mg


Potassium Chloride (K-Dur -)  10 meq PO DAILY [facility]


   Last Admin: 03/10/19 09:23 Dose:  10 meq


Ranitidine HCl (Zantac -)  300 mg PO DAILY [facility]


   Last Admin: 03/10/19 09:24 Dose:  300 mg











- Objective


Vital Signs: 


 Vital Signs











Temperature  98 F   03/10/19 09:19


 


Pulse Rate  84   03/10/19 09:19


 


Respiratory Rate  18   03/10/19 09:19


 


Blood Pressure  139/76   03/10/19 09:19


 


O2 Sat by Pulse Oximetry (%)  96   03/10/19 11:56











Constitutional: Yes: Well Nourished, Calm


Eyes: Yes: WNL


HENT: Yes: WNL


Neck: Yes: WNL


Cardiovascular: Yes: Regular Rate and Rhythm, S1, S2


Respiratory: Yes: Rhonchi (DIFFUSE MARIELA WHHZES AND RHONCHI), Wheezes


Gastrointestinal: Yes: Normal Bowel Sounds, Soft


Extremities: Yes: WNL


Edema: Yes


Labs: 


 CBC, BMP





 03/09/19 05:15 








Problem List





- Problems


(1) Acute on chronic respiratory failure with hypoxia and hypercapnia


Code(s): J96.21 - ACUTE AND CHRONIC RESPIRATORY FAILURE WITH HYPOXIA; J96.22 - 

ACUTE AND CHRONIC RESPIRATORY FAILURE WITH HYPERCAPNIA   





(2) SOB (shortness of breath)


Code(s): R06.02 - SHORTNESS OF BREATH   





(3) COPD exacerbation


Code(s): J44.1 - CHRONIC OBSTRUCTIVE PULMONARY DISEASE W (ACUTE) EXACERBATION   





(4) HTN (hypertension)


Code(s): I10 - ESSENTIAL (PRIMARY) HYPERTENSION   





(5) Tobacco abuse


Code(s): Z72.0 - TOBACCO USE   





(6) Tobacco abuse counseling


Code(s): Z71.6 - TOBACCO ABUSE COUNSELING   





Assessment/Plan








IMP ACUTE ON CHRONIC HYPOXEMIC/HYPERCAPNEIC RESPIRATORY FAILURE


      ADVANCED COPD WITH ACUTE EXACERBATION SLOWLY IMPROVING


      HTN


      ? H/O CHF


      TOBACCO ABUSE


       LIKELY WILSON


      





PLAN IV STEROIDS 


        INHALED BRONCHODILATORS


        SUPPLEMENTAL O2


        NIPPV AS NEEDED


        LASIX AS PER CARDIOLOGY


        AMBULATORY O2 SAT PRIOR TO DISCHARGE


        








DR SMITH


   


      


       


  





 Problem List 





- Problems


(1) Acute on chronic respiratory failure with hypoxia and hypercapnia


Code(s): J96.21 - ACUTE AND CHRONIC RESPIRATORY FAILURE WITH HYPOXIA; J96.22 - 

ACUTE AND CHRONIC RESPIRATORY FAILURE WITH HYPERCAPNIA   





(2) SOB (shortness of breath)


Code(s): R06.02 - SHORTNESS OF BREATH   





(3) COPD exacerbation


Code(s): J44.1 - CHRONIC OBSTRUCTIVE PULMONARY DISEASE W (ACUTE) EXACERBATION   





(4) HTN (hypertension)


Code(s): I10 - ESSENTIAL (PRIMARY) HYPERTENSION   





(5) Tobacco abuse


Code(s): Z72.0 - TOBACCO USE   





(6) Tobacco abuse counseling


Code(s): Z71.6 - TOBACCO ABUSE COUNSELING

## 2019-03-10 NOTE — PN
Progress Note, Physician





- Current Medication List


Current Medications: 


Active Medications





Albuterol Sulfate (Ventolin 0.083% Nebulizer Soln -)  1 amp NEB Q4H PRN


   PRN Reason: SHORT OF BREATH/WHEEZING


   Last Admin: 03/09/19 05:30 Dose:  1 amp


Albuterol/Ipratropium (Duoneb -)  1 amp NEB RQID [facility]


   Last Admin: 03/10/19 08:13 Dose:  1 amp


Atorvastatin Calcium (Lipitor -)  80 mg PO HS [facility]


   Last Admin: 03/09/19 21:00 Dose:  80 mg


Azithromycin (Zithromax -)  500 mg PO DAILY [facility]


   Last Admin: 03/10/19 09:23 Dose:  500 mg


Enoxaparin Sodium (Lovenox -)  40 mg SQ DAILY [facility]


   Last Admin: 03/10/19 09:25 Dose:  40 mg


Ferrous Sulfate (Feosol -)  325 mg PO DAILY [facility]


   Last Admin: 03/10/19 09:24 Dose:  325 mg


Furosemide (Lasix Injection -)  40 mg IVPUSH BID@0600,1400 [facility]


   Last Admin: 03/10/19 05:29 Dose:  40 mg


Guaifenesin (Robitussin -)  10 ml PO Q4H PRN


   PRN Reason: COUGH


   Last Admin: 03/10/19 06:13 Dose:  10 ml


Ceftriaxone Sodium 1 gm/ (Dextrose)  50 mls @ 100 mls/hr IVPB DAILY [facility]; 

Protocol


   Last Admin: 03/10/19 09:25 Dose:  100 mls/hr


Insulin Aspart (Novolog Vial Sliding Scale -)  0 vial SQ ACHS [facility]; Protocol


   Last Admin: 03/10/19 06:07 Dose:  Not Given


Lisinopril (Prinivil)  10 mg PO DAILY [facility]


   Last Admin: 03/10/19 09:25 Dose:  10 mg


Methylprednisolone Sodium Succinate (Solu-Medrol -)  60 mg IVPUSH Q6H-IV [facility]


   Last Admin: 03/10/19 09:21 Dose:  60 mg


Nicotine (Nicoderm Patch -)  21 mg TD HS [facility]


   Last Admin: 03/09/19 20:59 Dose:  21 mg


Potassium Chloride (K-Dur -)  10 meq PO DAILY [facility]


   Last Admin: 03/10/19 09:23 Dose:  10 meq


Ranitidine HCl (Zantac -)  300 mg PO DAILY [facility]


   Last Admin: 03/10/19 09:24 Dose:  300 mg











- Objective


Vital Signs: 


 Vital Signs











Temperature  98 F   03/10/19 09:19


 


Pulse Rate  84   03/10/19 09:19


 


Respiratory Rate  18   03/10/19 09:19


 


Blood Pressure  139/76   03/10/19 09:19


 


O2 Sat by Pulse Oximetry (%)  93 L  03/10/19 09:34











Cardiovascular: Yes: S1, S2


Respiratory: Yes: On Nasal O2, Rhonchi, Wheezes


Gastrointestinal: Yes: Normal Bowel Sounds, Soft


Labs: 


 CBC, BMP





 03/09/19 05:15 





 03/09/19 05:15 





 INR, PTT











INR  1.05  (0.83-1.09)   03/07/19  17:15    














Problem List





- Problems


(1) CHF (congestive heart failure)


Assessment/Plan: 


-IV LASIX-TO PO 40 QD


-CARDIO ON BOARD


-FOLLOW LABS


-EF 55%


Code(s): I50.9 - HEART FAILURE, UNSPECIFIED   





(2) COPD exacerbation


Assessment/Plan: 


-IV STEROIDS TAPER Q 8


-ABX


-NEBS


-PULM ON BOARD


-CT NOTED--PULM NODULE


Code(s): J44.1 - CHRONIC OBSTRUCTIVE PULMONARY DISEASE W (ACUTE) EXACERBATION   





(3) HTN (hypertension)


Assessment/Plan: 


CONTROLLED


Code(s): I10 - ESSENTIAL (PRIMARY) HYPERTENSION

## 2019-03-10 NOTE — PN
Progress Note, Physician


Chief Complaint: 





Cardiology follow up 


Feels better


Telem NSR





History of Present Illness: 





63 year old male with a long smoking history and COPD. He has been complaining 

of worsening SOB fir the past 2 weeks. He also has orthopnea. 


EKG NSR at 81 BPM with normal intervals, normal axis, and NSSTTW changes.


Troponin negative





Echocardiogram 3/9/19


Normal LV function


EF 50 - 55%


mild MR


Mild TR














- Current Medication List


Current Medications: 


Active Medications





Albuterol Sulfate (Ventolin 0.083% Nebulizer Soln -)  1 amp NEB Q4H PRN


   PRN Reason: SHORT OF BREATH/WHEEZING


   Last Admin: 03/09/19 05:30 Dose:  1 amp


Albuterol/Ipratropium (Duoneb -)  1 amp NEB RQID [facility]


   Last Admin: 03/10/19 11:56 Dose:  1 amp


Atorvastatin Calcium (Lipitor -)  80 mg PO HS [facility]


   Last Admin: 03/09/19 21:00 Dose:  80 mg


Azithromycin (Zithromax -)  500 mg PO DAILY [facility]


   Last Admin: 03/10/19 09:23 Dose:  500 mg


Enoxaparin Sodium (Lovenox -)  40 mg SQ DAILY [facility]


   Last Admin: 03/10/19 09:25 Dose:  40 mg


Ferrous Sulfate (Feosol -)  325 mg PO DAILY [facility]


   Last Admin: 03/10/19 09:24 Dose:  325 mg


Furosemide (Lasix -)  40 mg PO DAILY [facility]


Guaifenesin (Robitussin -)  10 ml PO Q4H PRN


   PRN Reason: COUGH


   Last Admin: 03/10/19 11:39 Dose:  10 ml


Ceftriaxone Sodium 1 gm/ (Dextrose)  50 mls @ 100 mls/hr IVPB DAILY [facility]; 

Protocol


   Last Admin: 03/10/19 09:25 Dose:  100 mls/hr


Insulin Aspart (Novolog Vial Sliding Scale -)  0 vial SQ ACHS [facility]; Protocol


   Last Admin: 03/10/19 11:41 Dose:  Not Given


Lisinopril (Prinivil)  10 mg PO DAILY [facility]


   Last Admin: 03/10/19 09:25 Dose:  10 mg


Methylprednisolone Sodium Succinate (Solu-Medrol -)  60 mg IVPUSH Q8H-IV [facility]


Nicotine (Nicoderm Patch -)  21 mg TD HS [facility]


   Last Admin: 03/09/19 20:59 Dose:  21 mg


Potassium Chloride (K-Dur -)  10 meq PO DAILY [facility]


   Last Admin: 03/10/19 09:23 Dose:  10 meq


Ranitidine HCl (Zantac -)  300 mg PO DAILY [facility]


   Last Admin: 03/10/19 09:24 Dose:  300 mg











- Objective


Vital Signs: 


 Vital Signs











Temperature  97.7 F   03/10/19 13:05


 


Pulse Rate  86   03/10/19 13:05


 


Respiratory Rate  18   03/10/19 13:05


 


Blood Pressure  146/83   03/10/19 13:05


 


O2 Sat by Pulse Oximetry (%)  96   03/10/19 11:56











Constitutional: Yes: Well Nourished, No Distress


Eyes: Yes: Conjunctiva Clear, EOM Intact


HENT: Yes: Atraumatic, Normocephalic


Neck: Yes: Supple, Trachea Midline


Cardiovascular: Yes: Regular Rate and Rhythm, S1, S2.  No: JVD


Respiratory: Yes: CTA Bilaterally


Gastrointestinal: Yes: Normal Bowel Sounds


Edema: No


Labs: 


 CBC, BMP





 03/09/19 05:15 





 03/09/19 05:15 





 INR, PTT











INR  1.05  (0.83-1.09)   03/07/19  17:15    














Problem List





- Problems


(1) Acute on chronic respiratory failure with hypoxia and hypercapnia


Code(s): J96.21 - ACUTE AND CHRONIC RESPIRATORY FAILURE WITH HYPOXIA; J96.22 - 

ACUTE AND CHRONIC RESPIRATORY FAILURE WITH HYPERCAPNIA   





(2) CHF (congestive heart failure)


Code(s): I50.9 - HEART FAILURE, UNSPECIFIED   





Assessment/Plan





63 year old male with a long smoking history and COPD. He has been complaining 

of worsening SOB fir the past 2 weeks.  


EKG NSR at 81 BPM with normal intervals, normal axis, and NSSTTW changes.


Troponin negative





Echocardiogram 3/9/19


Normal LV function


EF 50 - 55%


mild MR


Mild TR





COPD exacerbation


Improved HFpEF


Lasix 40 PO


FU PRN

## 2019-03-11 RX ADMIN — IPRATROPIUM BROMIDE AND ALBUTEROL SULFATE SCH AMP: .5; 3 SOLUTION RESPIRATORY (INHALATION) at 07:40

## 2019-03-11 RX ADMIN — INSULIN ASPART SCH: 100 INJECTION, SOLUTION INTRAVENOUS; SUBCUTANEOUS at 12:00

## 2019-03-11 RX ADMIN — IPRATROPIUM BROMIDE AND ALBUTEROL SULFATE SCH AMP: .5; 3 SOLUTION RESPIRATORY (INHALATION) at 11:00

## 2019-03-11 RX ADMIN — CEFTRIAXONE SCH MLS/HR: 1 INJECTION, POWDER, FOR SOLUTION INTRAMUSCULAR; INTRAVENOUS at 10:01

## 2019-03-11 RX ADMIN — ENOXAPARIN SODIUM SCH MG: 40 INJECTION SUBCUTANEOUS at 10:02

## 2019-03-11 RX ADMIN — ALBUTEROL SULFATE PRN AMP: 2.5 SOLUTION RESPIRATORY (INHALATION) at 08:53

## 2019-03-11 RX ADMIN — FERROUS SULFATE TAB EC 324 MG (65 MG FE EQUIVALENT) SCH MG: 324 (65 FE) TABLET DELAYED RESPONSE at 10:04

## 2019-03-11 RX ADMIN — ATORVASTATIN CALCIUM SCH MG: 80 TABLET, FILM COATED ORAL at 21:28

## 2019-03-11 RX ADMIN — FUROSEMIDE SCH MG: 40 TABLET ORAL at 10:01

## 2019-03-11 RX ADMIN — POTASSIUM CHLORIDE SCH MEQ: 750 TABLET, EXTENDED RELEASE ORAL at 10:01

## 2019-03-11 RX ADMIN — IPRATROPIUM BROMIDE AND ALBUTEROL SULFATE SCH AMP: .5; 3 SOLUTION RESPIRATORY (INHALATION) at 20:30

## 2019-03-11 RX ADMIN — METHYLPREDNISOLONE SODIUM SUCCINATE SCH MG: 40 INJECTION, POWDER, FOR SOLUTION INTRAMUSCULAR; INTRAVENOUS at 01:00

## 2019-03-11 RX ADMIN — NICOTINE SCH MG: 21 PATCH TRANSDERMAL at 21:29

## 2019-03-11 RX ADMIN — INSULIN ASPART SCH UNITS: 100 INJECTION, SOLUTION INTRAVENOUS; SUBCUTANEOUS at 17:20

## 2019-03-11 RX ADMIN — METHYLPREDNISOLONE SODIUM SUCCINATE SCH MG: 40 INJECTION, POWDER, FOR SOLUTION INTRAMUSCULAR; INTRAVENOUS at 17:14

## 2019-03-11 RX ADMIN — INSULIN ASPART SCH UNITS: 100 INJECTION, SOLUTION INTRAVENOUS; SUBCUTANEOUS at 21:29

## 2019-03-11 RX ADMIN — IPRATROPIUM BROMIDE AND ALBUTEROL SULFATE SCH AMP: .5; 3 SOLUTION RESPIRATORY (INHALATION) at 16:10

## 2019-03-11 RX ADMIN — GUAIFENESIN PRN ML: 100 SOLUTION ORAL at 21:29

## 2019-03-11 RX ADMIN — RANITIDINE SCH MG: 150 TABLET ORAL at 10:01

## 2019-03-11 RX ADMIN — INSULIN ASPART SCH: 100 INJECTION, SOLUTION INTRAVENOUS; SUBCUTANEOUS at 06:03

## 2019-03-11 RX ADMIN — AZITHROMYCIN SCH MG: 250 TABLET, FILM COATED ORAL at 10:01

## 2019-03-11 RX ADMIN — METHYLPREDNISOLONE SODIUM SUCCINATE SCH MG: 40 INJECTION, POWDER, FOR SOLUTION INTRAMUSCULAR; INTRAVENOUS at 10:02

## 2019-03-11 RX ADMIN — LISINOPRIL SCH MG: 10 TABLET ORAL at 12:08

## 2019-03-11 NOTE — PN
Progress Note, Physician


History of Present Illness: 





PULMONARY





ALERT,FEELING BETTER,LESS DYSPNEIC,-CP,+ COUGH





- Current Medication List


Current Medications: 


Active Medications





Albuterol Sulfate (Ventolin 0.083% Nebulizer Soln -)  1 amp NEB Q4H PRN


   PRN Reason: SHORT OF BREATH/WHEEZING


   Last Admin: 03/11/19 08:53 Dose:  1 amp


Albuterol/Ipratropium (Duoneb -)  1 amp NEB RQID [facility]


   Last Admin: 03/11/19 11:00 Dose:  1 amp


Atorvastatin Calcium (Lipitor -)  80 mg PO HS [facility]


   Last Admin: 03/10/19 21:16 Dose:  80 mg


Azithromycin (Zithromax -)  500 mg PO DAILY [facility]


   Last Admin: 03/11/19 10:01 Dose:  500 mg


Enoxaparin Sodium (Lovenox -)  40 mg SQ DAILY [facility]


   Last Admin: 03/11/19 10:02 Dose:  40 mg


Ferrous Sulfate (Feosol -)  325 mg PO DAILY [facility]


   Last Admin: 03/11/19 10:04 Dose:  325 mg


Furosemide (Lasix -)  40 mg PO DAILY [facility]


   Last Admin: 03/11/19 10:01 Dose:  40 mg


Guaifenesin (Robitussin -)  10 ml PO Q4H PRN


   PRN Reason: COUGH


   Last Admin: 03/10/19 21:16 Dose:  10 ml


Ceftriaxone Sodium 1 gm/ (Dextrose)  50 mls @ 100 mls/hr IVPB DAILY [facility]; 

Protocol


   Last Admin: 03/11/19 10:01 Dose:  100 mls/hr


Insulin Aspart (Novolog Vial Sliding Scale -)  0 vial SQ ACHS [facility]; Protocol


   Last Admin: 03/11/19 06:03 Dose:  Not Given


Lisinopril (Prinivil)  10 mg PO DAILY [facility]


   Last Admin: 03/10/19 09:25 Dose:  10 mg


Methylprednisolone Sodium Succinate (Solu-Medrol -)  60 mg IVPUSH Q8H-IV [facility]


   Last Admin: 03/11/19 10:02 Dose:  60 mg


Nicotine (Nicoderm Patch -)  21 mg TD HS [facility]


   Last Admin: 03/10/19 21:17 Dose:  21 mg


Potassium Chloride (K-Dur -)  10 meq PO DAILY [facility]


   Last Admin: 03/11/19 10:01 Dose:  10 meq


Ranitidine HCl (Zantac -)  300 mg PO DAILY [facility]


   Last Admin: 03/11/19 10:01 Dose:  300 mg











- Objective


Vital Signs: 


 Vital Signs











Temperature  97.7 F   03/11/19 08:13


 


Pulse Rate  84   03/11/19 08:13


 


Respiratory Rate  20   03/11/19 08:13


 


Blood Pressure  137/87   03/11/19 08:13


 


O2 Sat by Pulse Oximetry (%)  93 L  03/11/19 08:13











Constitutional: Yes: Well Nourished, Calm


Eyes: Yes: WNL


HENT: Yes: WNL


Neck: Yes: WNL


Cardiovascular: Yes: Regular Rate and Rhythm, S1, S2


Respiratory: Yes: Rhonchi (LESS WHEEZES AND RHONCHHI BILATERALLY), Wheezes


Gastrointestinal: Yes: Normal Bowel Sounds, Soft


Extremities: Yes: WNL


Edema: No


Labs: 


 CBC, BMP








Problem List





- Problems


(1) Acute on chronic respiratory failure with hypoxia and hypercapnia


Code(s): J96.21 - ACUTE AND CHRONIC RESPIRATORY FAILURE WITH HYPOXIA; J96.22 - 

ACUTE AND CHRONIC RESPIRATORY FAILURE WITH HYPERCAPNIA   





(2) SOB (shortness of breath)


Code(s): R06.02 - SHORTNESS OF BREATH   





(3) COPD exacerbation


Code(s): J44.1 - CHRONIC OBSTRUCTIVE PULMONARY DISEASE W (ACUTE) EXACERBATION   





(4) HTN (hypertension)


Code(s): I10 - ESSENTIAL (PRIMARY) HYPERTENSION   





(5) Tobacco abuse


Code(s): Z72.0 - TOBACCO USE   





(6) Tobacco abuse counseling


Code(s): Z71.6 - TOBACCO ABUSE COUNSELING   





Assessment/Plan








IMP ACUTE ON CHRONIC HYPOXEMIC/HYPERCAPNEIC RESPIRATORY FAILURE


      ADVANCED COPD WITH ACUTE EXACERBATION SLOWLY IMPROVING


      HTN


      ? H/O CHF


      TOBACCO ABUSE


       LIKELY WILSON


      





PLAN TAPER  STEROIDS


        INHALED BRONCHODILATORS


        SUPPLEMENTAL O2


        NIPPV AS NEEDED


        LASIX AS PER CARDIOLOGY


        AMBULATORY O2 SAT PRIOR TO DISCHARGE


        








DR SMITH


   


      


       


  





 Problem List 





- Problems


(1) Acute on chronic respiratory failure with hypoxia and hypercapnia


Code(s): J96.21 - ACUTE AND CHRONIC RESPIRATORY FAILURE WITH HYPOXIA; J96.22 - 

ACUTE AND CHRONIC RESPIRATORY FAILURE WITH HYPERCAPNIA   





(2) SOB (shortness of breath)


Code(s): R06.02 - SHORTNESS OF BREATH   





(3) COPD exacerbation


Code(s): J44.1 - CHRONIC OBSTRUCTIVE PULMONARY DISEASE W (ACUTE) EXACERBATION   





(4) HTN (hypertension)


Code(s): I10 - ESSENTIAL (PRIMARY) HYPERTENSION   





(5) Tobacco abuse


Code(s): Z72.0 - TOBACCO USE   





(6) Tobacco abuse counseling


Code(s): Z71.6 - TOBACCO ABUSE COUNSELING

## 2019-03-11 NOTE — PN
Progress Note, Physician


Chief Complaint: 





SOB


COPD exacerbation


History of Present Illness: 





Previous notes and events reviewed


awake and alert


NAD


patient states have less SOB and dyspnea on exertion


cough with yellow colored sputum





- Current Medication List


Current Medications: 


Active Medications





Albuterol Sulfate (Ventolin 0.083% Nebulizer Soln -)  1 amp NEB Q4H PRN


   PRN Reason: SHORT OF BREATH/WHEEZING


   Last Admin: 03/11/19 08:53 Dose:  1 amp


Albuterol/Ipratropium (Duoneb -)  1 amp NEB RQID [facility]


   Last Admin: 03/11/19 11:00 Dose:  1 amp


Atorvastatin Calcium (Lipitor -)  80 mg PO HS [facility]


   Last Admin: 03/10/19 21:16 Dose:  80 mg


Azithromycin (Zithromax -)  500 mg PO DAILY [facility]


   Last Admin: 03/11/19 10:01 Dose:  500 mg


Enoxaparin Sodium (Lovenox -)  40 mg SQ DAILY [facility]


   Last Admin: 03/11/19 10:02 Dose:  40 mg


Ferrous Sulfate (Feosol -)  325 mg PO DAILY [facility]


   Last Admin: 03/11/19 10:04 Dose:  325 mg


Furosemide (Lasix -)  40 mg PO DAILY [facility]


   Last Admin: 03/11/19 10:01 Dose:  40 mg


Guaifenesin (Robitussin -)  10 ml PO Q4H PRN


   PRN Reason: COUGH


   Last Admin: 03/10/19 21:16 Dose:  10 ml


Ceftriaxone Sodium 1 gm/ (Dextrose)  50 mls @ 100 mls/hr IVPB DAILY [facility]; 

Protocol


   Last Admin: 03/11/19 10:01 Dose:  100 mls/hr


Insulin Aspart (Novolog Vial Sliding Scale -)  0 vial SQ ACHS [facility]; Protocol


   Last Admin: 03/11/19 06:03 Dose:  Not Given


Lisinopril (Prinivil)  10 mg PO DAILY [facility]


   Last Admin: 03/10/19 09:25 Dose:  10 mg


Methylprednisolone Sodium Succinate (Solu-Medrol -)  60 mg IVPUSH Q8H-IV [facility]


   Last Admin: 03/11/19 10:02 Dose:  60 mg


Nicotine (Nicoderm Patch -)  21 mg TD HS [facility]


   Last Admin: 03/10/19 21:17 Dose:  21 mg


Potassium Chloride (K-Dur -)  10 meq PO DAILY [facility]


   Last Admin: 03/11/19 10:01 Dose:  10 meq


Ranitidine HCl (Zantac -)  300 mg PO DAILY [facility]


   Last Admin: 03/11/19 10:01 Dose:  300 mg











- Objective


Vital Signs: 


 Vital Signs











Temperature  97.7 F   03/11/19 08:13


 


Pulse Rate  84   03/11/19 08:13


 


Respiratory Rate  20   03/11/19 08:13


 


Blood Pressure  137/87   03/11/19 08:13


 


O2 Sat by Pulse Oximetry (%)  93 L  03/11/19 08:13











Constitutional: Yes: No Distress, Calm


Eyes: Yes: Conjunctiva Clear


HENT: Yes: Atraumatic


Cardiovascular: Yes: Regular Rate and Rhythm


Respiratory: Yes: On Nasal O2, Wheezes (B/L)


Gastrointestinal: Yes: Normal Bowel Sounds, Soft


Musculoskeletal: Yes: Muscle Weakness


Extremities: Yes: WNL


Edema: No


Neurological: Yes: Alert, Oriented


Psychiatric: Yes: Alert, Oriented


Labs: 


 CBC, BMP





 03/09/19 05:15 





 03/09/19 05:15 





 INR, PTT











INR  1.05  (0.83-1.09)   03/07/19  17:15    














Problem List





- Problems


(1) Acute on chronic respiratory failure with hypoxia and hypercapnia


Code(s): J96.21 - ACUTE AND CHRONIC RESPIRATORY FAILURE WITH HYPOXIA; J96.22 - 

ACUTE AND CHRONIC RESPIRATORY FAILURE WITH HYPERCAPNIA   





(2) COPD exacerbation


Assessment/Plan: 


-pulmonary on board


-O2 via [place] for SOB


-maintain SpO2 >90%


-Bipap at night


-continue with albuterol and duo neb tx PRN


-ID on board


-continue with ceftriaxone and azithromycin


-IV methylprednisone 


-robitussin PRN for cough 


-pre and post oxygen needs to be done prior to discharge 


Code(s): J44.1 - CHRONIC OBSTRUCTIVE PULMONARY DISEASE W (ACUTE) EXACERBATION   





(3) HTN (hypertension)


Assessment/Plan: 


-continue with lisinopril daily


-low Na diet 


Code(s): I10 - ESSENTIAL (PRIMARY) HYPERTENSION   





(4) SOB (shortness of breath)


Assessment/Plan: 


-pulmonary on board


-albuterol and duo neb tx PRN for SOB


-supplemental O2 via [place] PRN for SOB


-Bipap at night


-keep SpO2 >90%


Code(s): R06.02 - SHORTNESS OF BREATH   





(5) Tobacco abuse


Assessment/Plan: 


-daily nicotine patch 


Code(s): Z72.0 - TOBACCO USE   





(6) CHF (congestive heart failure)


Assessment/Plan: 


-cardiology on board


-tele monitoring


-lasix PO daily


-1L fluid restriction 


Code(s): I50.9 - HEART FAILURE, UNSPECIFIED   





Assessment/Plan





see problem list


dvt ppx

## 2019-03-12 LAB
ALBUMIN SERPL-MCNC: 3.1 G/DL (ref 3.4–5)
ALP SERPL-CCNC: 75 U/L (ref 45–117)
ALT SERPL-CCNC: 27 U/L (ref 13–61)
ANION GAP SERPL CALC-SCNC: 5 MMOL/L (ref 8–16)
AST SERPL-CCNC: 11 U/L (ref 15–37)
BILIRUB SERPL-MCNC: 0.2 MG/DL (ref 0.2–1)
BUN SERPL-MCNC: 20 MG/DL (ref 7–18)
CALCIUM SERPL-MCNC: 8.9 MG/DL (ref 8.5–10.1)
CHLORIDE SERPL-SCNC: 97 MMOL/L (ref 98–107)
CO2 SERPL-SCNC: 35 MMOL/L (ref 21–32)
CREAT SERPL-MCNC: 0.8 MG/DL (ref 0.55–1.3)
DEPRECATED RDW RBC AUTO: 16 % (ref 11.9–15.9)
GLUCOSE SERPL-MCNC: 117 MG/DL (ref 74–106)
HCT VFR BLD CALC: 43.2 % (ref 35.4–49)
HGB BLD-MCNC: 13.4 GM/DL (ref 11.7–16.9)
MCH RBC QN AUTO: 27.8 PG (ref 25.7–33.7)
MCHC RBC AUTO-ENTMCNC: 31 G/DL (ref 32–35.9)
MCV RBC: 89.4 FL (ref 80–96)
PLATELET # BLD AUTO: 316 K/MM3 (ref 134–434)
PMV BLD: 8.5 FL (ref 7.5–11.1)
POTASSIUM SERPLBLD-SCNC: 4.5 MMOL/L (ref 3.5–5.1)
PROT SERPL-MCNC: 7.1 G/DL (ref 6.4–8.2)
RBC # BLD AUTO: 4.84 M/MM3 (ref 4–5.6)
SODIUM SERPL-SCNC: 136 MMOL/L (ref 136–145)
WBC # BLD AUTO: 13 K/MM3 (ref 4–10)

## 2019-03-12 RX ADMIN — FUROSEMIDE SCH MG: 40 TABLET ORAL at 10:03

## 2019-03-12 RX ADMIN — METHYLPREDNISOLONE SODIUM SUCCINATE SCH MG: 40 INJECTION, POWDER, FOR SOLUTION INTRAMUSCULAR; INTRAVENOUS at 02:02

## 2019-03-12 RX ADMIN — LISINOPRIL SCH MG: 10 TABLET ORAL at 10:03

## 2019-03-12 RX ADMIN — INSULIN ASPART SCH: 100 INJECTION, SOLUTION INTRAVENOUS; SUBCUTANEOUS at 11:52

## 2019-03-12 RX ADMIN — IPRATROPIUM BROMIDE AND ALBUTEROL SULFATE SCH AMP: .5; 3 SOLUTION RESPIRATORY (INHALATION) at 11:06

## 2019-03-12 RX ADMIN — CEFTRIAXONE SCH MLS/HR: 1 INJECTION, POWDER, FOR SOLUTION INTRAMUSCULAR; INTRAVENOUS at 10:02

## 2019-03-12 RX ADMIN — FERROUS SULFATE TAB EC 324 MG (65 MG FE EQUIVALENT) SCH MG: 324 (65 FE) TABLET DELAYED RESPONSE at 10:03

## 2019-03-12 RX ADMIN — ENOXAPARIN SODIUM SCH MG: 40 INJECTION SUBCUTANEOUS at 10:02

## 2019-03-12 RX ADMIN — IPRATROPIUM BROMIDE AND ALBUTEROL SULFATE SCH AMP: .5; 3 SOLUTION RESPIRATORY (INHALATION) at 07:33

## 2019-03-12 RX ADMIN — IPRATROPIUM BROMIDE AND ALBUTEROL SULFATE SCH AMP: .5; 3 SOLUTION RESPIRATORY (INHALATION) at 19:33

## 2019-03-12 RX ADMIN — IPRATROPIUM BROMIDE AND ALBUTEROL SULFATE SCH AMP: .5; 3 SOLUTION RESPIRATORY (INHALATION) at 16:06

## 2019-03-12 RX ADMIN — POTASSIUM CHLORIDE SCH MEQ: 750 TABLET, EXTENDED RELEASE ORAL at 10:03

## 2019-03-12 RX ADMIN — INSULIN ASPART SCH UNITS: 100 INJECTION, SOLUTION INTRAVENOUS; SUBCUTANEOUS at 17:27

## 2019-03-12 RX ADMIN — GUAIFENESIN PRN ML: 100 SOLUTION ORAL at 10:02

## 2019-03-12 RX ADMIN — INSULIN ASPART SCH: 100 INJECTION, SOLUTION INTRAVENOUS; SUBCUTANEOUS at 06:04

## 2019-03-12 RX ADMIN — ATORVASTATIN CALCIUM SCH MG: 80 TABLET, FILM COATED ORAL at 21:59

## 2019-03-12 RX ADMIN — METHYLPREDNISOLONE SODIUM SUCCINATE SCH MG: 40 INJECTION, POWDER, FOR SOLUTION INTRAMUSCULAR; INTRAVENOUS at 21:59

## 2019-03-12 RX ADMIN — RANITIDINE SCH MG: 150 TABLET ORAL at 10:03

## 2019-03-12 RX ADMIN — INSULIN ASPART SCH: 100 INJECTION, SOLUTION INTRAVENOUS; SUBCUTANEOUS at 21:59

## 2019-03-12 RX ADMIN — NICOTINE SCH MG: 21 PATCH TRANSDERMAL at 21:59

## 2019-03-12 RX ADMIN — METHYLPREDNISOLONE SODIUM SUCCINATE SCH MG: 40 INJECTION, POWDER, FOR SOLUTION INTRAMUSCULAR; INTRAVENOUS at 10:03

## 2019-03-12 RX ADMIN — AZITHROMYCIN SCH MG: 250 TABLET, FILM COATED ORAL at 10:03

## 2019-03-12 NOTE — PN
Progress Note (short form)





- Note


Progress Note: 





PULMONARY





States breathing is improved, close to baseline. Still some wheezing.





 Vital Signs











 Period  Temp  Pulse  Resp  BP Sys/Diaz  Pulse Ox


 


 Last 24 Hr  97.6 F-98.7 F  77-90  20-20  134-157/79-98  94-97








Gen:  NAD at rest


Heart: RRR


Lung: scattered wheezes


Abd: soft, nontender


Ext: no edema





 CBC, BMP





 03/12/19 06:30 





 03/12/19 06:30 





Active Medications





Albuterol Sulfate (Ventolin 0.083% Nebulizer Soln -)  1 amp NEB Q4H PRN


   PRN Reason: SHORT OF BREATH/WHEEZING


   Last Admin: 03/11/19 08:53 Dose:  1 amp


Albuterol/Ipratropium (Duoneb -)  1 amp NEB RQID [facility]


   Last Admin: 03/12/19 11:06 Dose:  1 amp


Atorvastatin Calcium (Lipitor -)  80 mg PO HS [facility]


   Last Admin: 03/11/19 21:28 Dose:  80 mg


Azithromycin (Zithromax -)  500 mg PO DAILY [facility]


   Last Admin: 03/12/19 10:03 Dose:  500 mg


Enoxaparin Sodium (Lovenox -)  40 mg SQ DAILY [facility]


   Last Admin: 03/12/19 10:02 Dose:  40 mg


Ferrous Sulfate (Feosol -)  325 mg PO DAILY [facility]


   Last Admin: 03/12/19 10:03 Dose:  325 mg


Furosemide (Lasix -)  40 mg PO DAILY [facility]


   Last Admin: 03/12/19 10:03 Dose:  40 mg


Guaifenesin (Robitussin -)  10 ml PO Q4H PRN


   PRN Reason: COUGH


   Last Admin: 03/12/19 10:02 Dose:  10 ml


Ceftriaxone Sodium 1 gm/ (Dextrose)  50 mls @ 100 mls/hr IVPB DAILY [facility]; 

Protocol


   Last Admin: 03/12/19 10:02 Dose:  100 mls/hr


Insulin Aspart (Novolog Vial Sliding Scale -)  0 vial SQ ACHS [facility]; Protocol


   Last Admin: 03/12/19 11:52 Dose:  Not Given


Lisinopril (Prinivil)  10 mg PO DAILY [facility]


   Last Admin: 03/12/19 10:03 Dose:  10 mg


Methylprednisolone Sodium Succinate (Solu-Medrol -)  40 mg IVPUSH BID [facility]


   Last Admin: 03/12/19 10:03 Dose:  40 mg


Nicotine (Nicoderm Patch -)  21 mg TD HS [facility]


   Last Admin: 03/11/19 21:29 Dose:  21 mg


Potassium Chloride (K-Dur -)  10 meq PO DAILY [facility]


   Last Admin: 03/12/19 10:03 Dose:  10 meq


Ranitidine HCl (Zantac -)  300 mg PO DAILY [facility]


   Last Admin: 03/12/19 10:03 Dose:  300 mg





A/P


Acute on Chronic Hypoxic and Hypercapneic Respiratory Failure


Acute COPD Exacerbation


Morbid Obesity


Likely WILSON


Smoker





-  can change steroids to PO prednisone 40mg daily


-  inhaled bronchodilators


-  complete antibiotics


-  o2 to keep Spo2 >90%


-  smoking cessation


-  outpt PFTs, PSG


-  DVT prophylaxis


-  check ambulatory SpO2 on room air to assess for home O2

## 2019-03-12 NOTE — PN
Progress Note, Physician





- Current Medication List


Current Medications: 


Active Medications





Albuterol Sulfate (Ventolin 0.083% Nebulizer Soln -)  1 amp NEB Q4H PRN


   PRN Reason: SHORT OF BREATH/WHEEZING


   Last Admin: 03/11/19 08:53 Dose:  1 amp


Albuterol/Ipratropium (Duoneb -)  1 amp NEB RQID [facility]


   Last Admin: 03/12/19 07:33 Dose:  1 amp


Atorvastatin Calcium (Lipitor -)  80 mg PO HS [facility]


   Last Admin: 03/11/19 21:28 Dose:  80 mg


Azithromycin (Zithromax -)  500 mg PO DAILY [facility]


   Last Admin: 03/11/19 10:01 Dose:  500 mg


Enoxaparin Sodium (Lovenox -)  40 mg SQ DAILY [facility]


   Last Admin: 03/11/19 10:02 Dose:  40 mg


Ferrous Sulfate (Feosol -)  325 mg PO DAILY [facility]


   Last Admin: 03/11/19 10:04 Dose:  325 mg


Furosemide (Lasix -)  40 mg PO DAILY [facility]


   Last Admin: 03/11/19 10:01 Dose:  40 mg


Guaifenesin (Robitussin -)  10 ml PO Q4H PRN


   PRN Reason: COUGH


   Last Admin: 03/11/19 21:29 Dose:  10 ml


Ceftriaxone Sodium 1 gm/ (Dextrose)  50 mls @ 100 mls/hr IVPB DAILY [facility]; 

Protocol


   Last Admin: 03/11/19 10:01 Dose:  100 mls/hr


Insulin Aspart (Novolog Vial Sliding Scale -)  0 vial SQ ACHS [facility]; Protocol


   Last Admin: 03/12/19 06:04 Dose:  Not Given


Lisinopril (Prinivil)  10 mg PO DAILY [facility]


   Last Admin: 03/11/19 12:08 Dose:  10 mg


Methylprednisolone Sodium Succinate (Solu-Medrol -)  60 mg IVPUSH Q8H-IV [facility]


   Last Admin: 03/12/19 02:02 Dose:  60 mg


Nicotine (Nicoderm Patch -)  21 mg TD HS [facility]


   Last Admin: 03/11/19 21:29 Dose:  21 mg


Potassium Chloride (K-Dur -)  10 meq PO DAILY [facility]


   Last Admin: 03/11/19 10:01 Dose:  10 meq


Ranitidine HCl (Zantac -)  300 mg PO DAILY [facility]


   Last Admin: 03/11/19 10:01 Dose:  300 mg











- Objective


Vital Signs: 


 Vital Signs











Temperature  98.3 F   03/12/19 06:00


 


Pulse Rate  82   03/12/19 06:00


 


Respiratory Rate  20   03/12/19 06:00


 


Blood Pressure  157/98   03/12/19 06:00


 


O2 Sat by Pulse Oximetry (%)  97   03/12/19 07:32











Cardiovascular: Yes: S1, S2


Respiratory: Yes: On Nasal O2, Rhonchi.  No: Wheezes


Gastrointestinal: Yes: Normal Bowel Sounds, Soft


Labs: 


 CBC, BMP





 03/12/19 06:30 





 03/12/19 06:30 





 INR, PTT











INR  1.05  (0.83-1.09)   03/07/19  17:15    














Problem List





- Problems


(1) CHF (congestive heart failure)


Assessment/Plan: 


-IV LASIX-TO PO 40 QD


-CARDIO ON BOARD


-FOLLOW LABS


-EF 55%


Code(s): I50.9 - HEART FAILURE, UNSPECIFIED   





(2) COPD exacerbation


Assessment/Plan: 


-IV STEROIDS TAPER Q 8--40 BID


-ABX


-NEBS


-PULM ON BOARD


-CT NOTED--PULM NODULE


Code(s): J44.1 - CHRONIC OBSTRUCTIVE PULMONARY DISEASE W (ACUTE) EXACERBATION   





(3) HTN (hypertension)


Assessment/Plan: 


CONTROLLED


Code(s): I10 - ESSENTIAL (PRIMARY) HYPERTENSION

## 2019-03-13 RX ADMIN — IPRATROPIUM BROMIDE AND ALBUTEROL SULFATE SCH AMP: .5; 3 SOLUTION RESPIRATORY (INHALATION) at 07:27

## 2019-03-13 RX ADMIN — INSULIN ASPART SCH: 100 INJECTION, SOLUTION INTRAVENOUS; SUBCUTANEOUS at 17:02

## 2019-03-13 RX ADMIN — PREDNISONE SCH MG: 10 TABLET ORAL at 11:00

## 2019-03-13 RX ADMIN — IPRATROPIUM BROMIDE AND ALBUTEROL SULFATE SCH AMP: .5; 3 SOLUTION RESPIRATORY (INHALATION) at 21:09

## 2019-03-13 RX ADMIN — IPRATROPIUM BROMIDE AND ALBUTEROL SULFATE SCH AMP: .5; 3 SOLUTION RESPIRATORY (INHALATION) at 15:29

## 2019-03-13 RX ADMIN — LISINOPRIL SCH MG: 20 TABLET ORAL at 11:00

## 2019-03-13 RX ADMIN — CEFTRIAXONE SCH MLS/HR: 1 INJECTION, POWDER, FOR SOLUTION INTRAMUSCULAR; INTRAVENOUS at 11:01

## 2019-03-13 RX ADMIN — ATORVASTATIN CALCIUM SCH MG: 80 TABLET, FILM COATED ORAL at 22:45

## 2019-03-13 RX ADMIN — IPRATROPIUM BROMIDE AND ALBUTEROL SULFATE SCH AMP: .5; 3 SOLUTION RESPIRATORY (INHALATION) at 11:14

## 2019-03-13 RX ADMIN — ENOXAPARIN SODIUM SCH MG: 40 INJECTION SUBCUTANEOUS at 11:00

## 2019-03-13 RX ADMIN — POTASSIUM CHLORIDE SCH MEQ: 750 TABLET, EXTENDED RELEASE ORAL at 11:00

## 2019-03-13 RX ADMIN — FUROSEMIDE SCH MG: 40 TABLET ORAL at 11:00

## 2019-03-13 RX ADMIN — FERROUS SULFATE TAB EC 324 MG (65 MG FE EQUIVALENT) SCH MG: 324 (65 FE) TABLET DELAYED RESPONSE at 11:00

## 2019-03-13 RX ADMIN — AZITHROMYCIN SCH MG: 250 TABLET, FILM COATED ORAL at 11:00

## 2019-03-13 RX ADMIN — NICOTINE SCH MG: 21 PATCH TRANSDERMAL at 22:46

## 2019-03-13 RX ADMIN — INSULIN ASPART SCH UNITS: 100 INJECTION, SOLUTION INTRAVENOUS; SUBCUTANEOUS at 07:00

## 2019-03-13 RX ADMIN — INSULIN ASPART SCH: 100 INJECTION, SOLUTION INTRAVENOUS; SUBCUTANEOUS at 11:17

## 2019-03-13 RX ADMIN — RANITIDINE SCH MG: 150 TABLET ORAL at 11:00

## 2019-03-13 RX ADMIN — INSULIN ASPART SCH: 100 INJECTION, SOLUTION INTRAVENOUS; SUBCUTANEOUS at 22:46

## 2019-03-13 RX ADMIN — PREDNISONE SCH MG: 10 TABLET ORAL at 22:45

## 2019-03-13 NOTE — PN
Progress Note, Physician





- Current Medication List


Current Medications: 


Active Medications





Albuterol Sulfate (Ventolin 0.083% Nebulizer Soln -)  1 amp NEB Q4H PRN


   PRN Reason: SHORT OF BREATH/WHEEZING


   Last Admin: 03/11/19 08:53 Dose:  1 amp


Albuterol/Ipratropium (Duoneb -)  1 amp NEB RQID [facility]


   Last Admin: 03/13/19 07:27 Dose:  1 amp


Atorvastatin Calcium (Lipitor -)  80 mg PO HS [facility]


   Last Admin: 03/12/19 21:59 Dose:  80 mg


Azithromycin (Zithromax -)  500 mg PO DAILY [facility]


   Last Admin: 03/12/19 10:03 Dose:  500 mg


Enoxaparin Sodium (Lovenox -)  40 mg SQ DAILY [facility]


   Last Admin: 03/12/19 10:02 Dose:  40 mg


Ferrous Sulfate (Feosol -)  325 mg PO DAILY [facility]


   Last Admin: 03/12/19 10:03 Dose:  325 mg


Furosemide (Lasix -)  40 mg PO DAILY [facility]


   Last Admin: 03/12/19 10:03 Dose:  40 mg


Guaifenesin (Robitussin -)  10 ml PO Q4H PRN


   PRN Reason: COUGH


   Last Admin: 03/12/19 10:02 Dose:  10 ml


Ceftriaxone Sodium 1 gm/ (Dextrose)  50 mls @ 100 mls/hr IVPB DAILY [facility]; 

Protocol


   Last Admin: 03/12/19 10:02 Dose:  100 mls/hr


Insulin Aspart (Novolog Vial Sliding Scale -)  0 vial SQ ACHS [facility]; Protocol


   Last Admin: 03/13/19 07:00 Dose:  2 units


Lisinopril (Prinivil)  20 mg PO DAILY [facility]


Nicotine (Nicoderm Patch -)  21 mg TD HS [facility]


   Last Admin: 03/12/19 21:59 Dose:  21 mg


Potassium Chloride (K-Dur -)  10 meq PO DAILY [facility]


   Last Admin: 03/12/19 10:03 Dose:  10 meq


Ranitidine HCl (Zantac -)  300 mg PO DAILY [facility]


   Last Admin: 03/12/19 10:03 Dose:  300 mg











- Objective


Vital Signs: 


 Vital Signs











Temperature  98.1 F   03/13/19 06:00


 


Pulse Rate  84   03/13/19 06:00


 


Respiratory Rate  18   03/13/19 06:00


 


Blood Pressure  147/90   03/13/19 06:00


 


O2 Sat by Pulse Oximetry (%)  98   03/13/19 07:27











Cardiovascular: Yes: S1, S2


Respiratory: Yes: Diminished, On Nasal O2


Gastrointestinal: Yes: Normal Bowel Sounds, Soft


Labs: 


 CBC, BMP





 03/12/19 06:30 





 03/12/19 06:30 





 INR, PTT











INR  1.05  (0.83-1.09)   03/07/19  17:15    














Problem List





- Problems


(1) CHF (congestive heart failure)


Assessment/Plan: 


-IV LASIX-TO PO 40 QD


-CARDIO ON BOARD


-FOLLOW LABS


-EF 55%


Code(s): I50.9 - HEART FAILURE, UNSPECIFIED   





(2) COPD exacerbation


Assessment/Plan: 


-IV STEROIDS TAPER Q 8--40 BID


-ABX


-NEBS


-PULM ON BOARD


-CT NOTED--PULM NODULE


-PRE AND POST


Code(s): J44.1 - CHRONIC OBSTRUCTIVE PULMONARY DISEASE W (ACUTE) EXACERBATION   





(3) HTN (hypertension)


Assessment/Plan: 


CONTROLLED


Code(s): I10 - ESSENTIAL (PRIMARY) HYPERTENSION   





Assessment/Plan





PHYSICAL THERAPY


DC PLANNING

## 2019-03-13 NOTE — CONSULT
Consult


Consult Specialty:: PM&R Dr Gotti for Dr Kearney





- History of Present Illness


Chief Complaint: BAEZ (improving)


History of Present Illness: 





This is a 63 year old man with a medical history of CHF, COPD, active smoker, 

who presented to the eD 3/07/19 with worsening SOB x2 weeks. He was admitted 

for acute on chronic hypercapnic and hypoxemic respiratory failure due to acute 

COPD exacerbation as well as HFpEF. Cardiology and Pulm were both consulted. He 

has not been seen by PT. He feels is breathing now is much better but not quite 

back to baseline. 





- History Source


History Provided By: Patient





- Alcohol/Substance Use


Hx Alcohol Use: No





- Smoking History


Smoking history: Current every day smoker


Have you smoked in the past 12 months: No


Aproximately how many cigarettes per day: 40





- Social History


Usual Living Arrangement: Other (with girlfriend in apartment with 2 steps to 

enter)


ADL: Independent





Home Medications





- Allergies


Allergies/Adverse Reactions: 


 Allergies











Allergy/AdvReac Type Severity Reaction Status Date / Time


 


No Known Allergies Allergy   Verified 03/07/19 17:36














- Home Medications


Home Medications: 


Ambulatory Orders





Atorvastatin Ca [Lipitor] 80 mg PO HS 03/07/19 


Ferrous Sulfate 325 mg PO DAILY 03/07/19 


Furosemide [Lasix] 40 mg PO DAILY 03/07/19 


Lisinopril 10 mg PO DAILY 03/07/19 


Potassium Chloride 10 meq PO DAILY 03/07/19 


Ranitidine [Zantac -] 300 mg PO ONCE 03/07/19 


Albuterol 2.5/Ipratropium 0.5 [Duoneb -] 1 amp NEB PRN 03/08/19 


Prednisone [Deltasone] 20 mg PO DAILY 03/08/19 











Review of Systems


Findings/Remarks: 





Denies fevers, chills, changes in vision/ hearing/ mood, CP, abdominal pain, 

nausea, vomiting, constipation, diarrhea, dysuria, muscle/ joint pain, numbness

/ paresthesias.


Notes SOB which is much better although not quite at baseline





Physical Exam


Vital Signs: 


 Vital Signs











Temperature  97.9 F   03/13/19 10:00


 


Pulse Rate  95 H  03/13/19 10:00


 


Respiratory Rate  18   03/13/19 10:00


 


Blood Pressure  159/87   03/13/19 10:00


 


O2 Sat by Pulse Oximetry (%)  98   03/13/19 07:27











Musculoskeletal: Yes: Other (General: calm obese AAM first observed ambulating 

in hallway with wide- based gait with increased amanda and without AD, then 

examined sitting EOB, AAO x3, NAD N/M: B shoulder flexion to 140 degrees with 

full BLE ROM, 5/5 BUE/ BLE Extremities: no BLE pitting edema, no B calf 

tenderness)


Labs: 


 CBC, BMP





 03/12/19 06:30 





 03/12/19 06:30 











Imaging





- Results


Cat Scan: Report Reviewed (3/08/19 CT chest shows mild COPD changes with 

centrilobular emphysema)


Other: Report Reviewed (3/08/19 echo was limited but shows EF 50-55% with mild 

MR and aortic sclerosis)





Assessment/Plan





Impression:


1) Deficits mobility/ ADLs


2) Deconditioning


3) Gait abnormality


4) COPD exacerbation


5) HFpEF with EF 50-55% and HTN


6) Obesity


7) Up to date flu shot/ pneumovax


8) Active smoker





Recommendations:


1) PT for functional mobility, stair and endurance


2) Falls, safety precautions


3) Cardiopulmonary precautions


4) Breathing techniques, energy conservation, endurance


5) DVT ppx: on Lovenox


6) Denies constipation on current bowel regimen


7) Nutrition consult for obesity


8) Smoking cessation information at discharge


9) Discharge planning: he will likely be able to return home with services once 

medically stable. He reports his girlfriend will be able to assist him as 

needed.





Thank you for this referral.

## 2019-03-13 NOTE — PN
Progress Note, Physician


History of Present Illness: 





PULMONARY








ALERT,FEELING BETTER,BREATHING BACK TO BASELINE





- Current Medication List


Current Medications: 


Active Medications





Albuterol Sulfate (Ventolin 0.083% Nebulizer Soln -)  1 amp NEB Q4H PRN


   PRN Reason: SHORT OF BREATH/WHEEZING


   Last Admin: 03/11/19 08:53 Dose:  1 amp


Albuterol/Ipratropium (Duoneb -)  1 amp NEB RQID [facility]


   Last Admin: 03/13/19 11:14 Dose:  1 amp


Atorvastatin Calcium (Lipitor -)  80 mg PO HS [facility]


   Last Admin: 03/12/19 21:59 Dose:  80 mg


Enoxaparin Sodium (Lovenox -)  40 mg SQ DAILY [facility]


   Last Admin: 03/13/19 11:00 Dose:  40 mg


Ferrous Sulfate (Feosol -)  325 mg PO DAILY [facility]


   Last Admin: 03/13/19 11:00 Dose:  325 mg


Furosemide (Lasix -)  40 mg PO DAILY [facility]


   Last Admin: 03/13/19 11:00 Dose:  40 mg


Guaifenesin (Robitussin -)  10 ml PO Q4H PRN


   PRN Reason: COUGH


   Last Admin: 03/12/19 10:02 Dose:  10 ml


Ceftriaxone Sodium 1 gm/ (Dextrose)  50 mls @ 100 mls/hr IVPB DAILY [facility]; 

Protocol


   Last Admin: 03/13/19 11:01 Dose:  100 mls/hr


Insulin Aspart (Novolog Vial Sliding Scale -)  0 vial SQ ACHS [facility]; Protocol


   Last Admin: 03/13/19 11:17 Dose:  Not Given


Lisinopril (Prinivil)  20 mg PO DAILY [facility]


   Last Admin: 03/13/19 11:00 Dose:  20 mg


Nicotine (Nicoderm Patch -)  21 mg TD HS [facility]


   Last Admin: 03/12/19 21:59 Dose:  21 mg


Potassium Chloride (K-Dur -)  10 meq PO DAILY [facility]


   Last Admin: 03/13/19 11:00 Dose:  10 meq


Prednisone (Deltasone -)  30 mg PO BID [facility]


   Last Admin: 03/13/19 11:00 Dose:  30 mg


Ranitidine HCl (Zantac -)  300 mg PO DAILY [facility]


   Last Admin: 03/13/19 11:00 Dose:  300 mg











- Objective


Vital Signs: 


 Vital Signs











Temperature  97.9 F   03/13/19 10:00


 


Pulse Rate  95 H  03/13/19 10:00


 


Respiratory Rate  18   03/13/19 10:00


 


Blood Pressure  159/87   03/13/19 10:00


 


O2 Sat by Pulse Oximetry (%)  98   03/13/19 07:27











Constitutional: Yes: Well Nourished, Calm


Eyes: Yes: WNL


HENT: Yes: WNL


Neck: Yes: WNL


Cardiovascular: Yes: Regular Rate and Rhythm, S1, S2


Respiratory: Yes: Wheezes (SCATTERED MARIELA WHEEZES)


Gastrointestinal: Yes: Normal Bowel Sounds, Soft


Extremities: Yes: WNL


Edema: No


Labs: 


 CBC, BMP





 03/12/19 06:30 





Problem List





- Problems


(1) Acute on chronic respiratory failure with hypoxia and hypercapnia


Code(s): J96.21 - ACUTE AND CHRONIC RESPIRATORY FAILURE WITH HYPOXIA; J96.22 - 

ACUTE AND CHRONIC RESPIRATORY FAILURE WITH HYPERCAPNIA   





(2) SOB (shortness of breath)


Code(s): R06.02 - SHORTNESS OF BREATH   





(3) COPD exacerbation


Code(s): J44.1 - CHRONIC OBSTRUCTIVE PULMONARY DISEASE W (ACUTE) EXACERBATION   





(4) HTN (hypertension)


Code(s): I10 - ESSENTIAL (PRIMARY) HYPERTENSION   





(5) Tobacco abuse


Code(s): Z72.0 - TOBACCO USE   





(6) Tobacco abuse counseling


Code(s): Z71.6 - TOBACCO ABUSE COUNSELING   





Assessment/Plan








IMP ACUTE ON CHRONIC HYPOXEMIC/HYPERCAPNEIC RESPIRATORY FAILURE


      ADVANCED COPD WITH ACUTE EXACERBATION IMPROVING


      HTN


      ? H/O CHF


      TOBACCO ABUSE


       LIKELY WILSON


      





PLAN PREDNISONE


        INHALED BRONCHODILATORS


        SUPPLEMENTAL O2


        NIPPV AS NEEDED


        LASIX PRN


        AMBULATORY O2 SAT PRIOR TO DISCHARGE


        








DR SMITH


   


      


       


  





 Problem List 





- Problems


(1) Acute on chronic respiratory failure with hypoxia and hypercapnia


Code(s): J96.21 - ACUTE AND CHRONIC RESPIRATORY FAILURE WITH HYPOXIA; J96.22 - 

ACUTE AND CHRONIC RESPIRATORY FAILURE WITH HYPERCAPNIA   





(2) SOB (shortness of breath)


Code(s): R06.02 - SHORTNESS OF BREATH   





(3) COPD exacerbation


Code(s): J44.1 - CHRONIC OBSTRUCTIVE PULMONARY DISEASE W (ACUTE) EXACERBATION   





(4) HTN (hypertension)


Code(s): I10 - ESSENTIAL (PRIMARY) HYPERTENSION   





(5) Tobacco abuse


Code(s): Z72.0 - TOBACCO USE   





(6) Tobacco abuse counseling


Code(s): Z71.6 - TOBACCO ABUSE COUNSELING

## 2019-03-14 LAB
ALBUMIN SERPL-MCNC: 3.2 G/DL (ref 3.4–5)
ALP SERPL-CCNC: 77 U/L (ref 45–117)
ALT SERPL-CCNC: 36 U/L (ref 13–61)
ANION GAP SERPL CALC-SCNC: 5 MMOL/L (ref 8–16)
AST SERPL-CCNC: 13 U/L (ref 15–37)
BILIRUB SERPL-MCNC: 0.1 MG/DL (ref 0.2–1)
BUN SERPL-MCNC: 20 MG/DL (ref 7–18)
CALCIUM SERPL-MCNC: 8.7 MG/DL (ref 8.5–10.1)
CHLORIDE SERPL-SCNC: 96 MMOL/L (ref 98–107)
CO2 SERPL-SCNC: 36 MMOL/L (ref 21–32)
CREAT SERPL-MCNC: 0.9 MG/DL (ref 0.55–1.3)
GLUCOSE SERPL-MCNC: 90 MG/DL (ref 74–106)
POTASSIUM SERPLBLD-SCNC: 4.7 MMOL/L (ref 3.5–5.1)
PROT SERPL-MCNC: 7 G/DL (ref 6.4–8.2)
SODIUM SERPL-SCNC: 137 MMOL/L (ref 136–145)

## 2019-03-14 RX ADMIN — INSULIN ASPART SCH: 100 INJECTION, SOLUTION INTRAVENOUS; SUBCUTANEOUS at 22:40

## 2019-03-14 RX ADMIN — IPRATROPIUM BROMIDE AND ALBUTEROL SULFATE SCH AMP: .5; 3 SOLUTION RESPIRATORY (INHALATION) at 21:06

## 2019-03-14 RX ADMIN — INSULIN ASPART SCH UNITS: 100 INJECTION, SOLUTION INTRAVENOUS; SUBCUTANEOUS at 11:05

## 2019-03-14 RX ADMIN — NICOTINE SCH MG: 21 PATCH TRANSDERMAL at 22:39

## 2019-03-14 RX ADMIN — CEFTRIAXONE SCH MLS/HR: 1 INJECTION, POWDER, FOR SOLUTION INTRAMUSCULAR; INTRAVENOUS at 10:38

## 2019-03-14 RX ADMIN — IPRATROPIUM BROMIDE AND ALBUTEROL SULFATE SCH AMP: .5; 3 SOLUTION RESPIRATORY (INHALATION) at 07:15

## 2019-03-14 RX ADMIN — PREDNISONE SCH MG: 10 TABLET ORAL at 10:38

## 2019-03-14 RX ADMIN — PREDNISONE SCH MG: 10 TABLET ORAL at 22:40

## 2019-03-14 RX ADMIN — INSULIN ASPART SCH UNITS: 100 INJECTION, SOLUTION INTRAVENOUS; SUBCUTANEOUS at 17:20

## 2019-03-14 RX ADMIN — IPRATROPIUM BROMIDE AND ALBUTEROL SULFATE SCH AMP: .5; 3 SOLUTION RESPIRATORY (INHALATION) at 11:38

## 2019-03-14 RX ADMIN — IPRATROPIUM BROMIDE AND ALBUTEROL SULFATE SCH AMP: .5; 3 SOLUTION RESPIRATORY (INHALATION) at 15:57

## 2019-03-14 RX ADMIN — LISINOPRIL SCH MG: 20 TABLET ORAL at 10:38

## 2019-03-14 RX ADMIN — RANITIDINE SCH MG: 150 TABLET ORAL at 10:38

## 2019-03-14 RX ADMIN — POTASSIUM CHLORIDE SCH MEQ: 750 TABLET, EXTENDED RELEASE ORAL at 10:38

## 2019-03-14 RX ADMIN — FERROUS SULFATE TAB EC 324 MG (65 MG FE EQUIVALENT) SCH MG: 324 (65 FE) TABLET DELAYED RESPONSE at 10:38

## 2019-03-14 RX ADMIN — INSULIN ASPART SCH: 100 INJECTION, SOLUTION INTRAVENOUS; SUBCUTANEOUS at 06:40

## 2019-03-14 RX ADMIN — FUROSEMIDE SCH MG: 40 TABLET ORAL at 10:38

## 2019-03-14 RX ADMIN — ENOXAPARIN SODIUM SCH MG: 40 INJECTION SUBCUTANEOUS at 10:37

## 2019-03-14 RX ADMIN — ATORVASTATIN CALCIUM SCH MG: 80 TABLET, FILM COATED ORAL at 22:39

## 2019-03-14 NOTE — PN
Progress Note (short form)





- Note


Progress Note: 





PULMONARY





States breathing is improved, at baseline. 





 Vital Signs











 Period  Temp  Pulse  Resp  BP Sys/Diaz  Pulse Ox


 


 Last 24 Hr  97.8 F-98.1 F    19-20  136-153/82-98  90-98











Gen:  NAD at rest


Heart: RRR


Lung: distant breath sounds, no wheezes


Abd: soft, nontender


Ext: no edema





 CBC, BMP





 03/12/19 06:30 





 03/12/19 06:30 





Active Medications





Albuterol Sulfate (Ventolin 0.083% Nebulizer Soln -)  1 amp NEB Q4H PRN


   PRN Reason: SHORT OF BREATH/WHEEZING


   Last Admin: 03/11/19 08:53 Dose:  1 amp


Albuterol/Ipratropium (Duoneb -)  1 amp NEB RQID [facility]


   Last Admin: 03/14/19 11:38 Dose:  1 amp


Atorvastatin Calcium (Lipitor -)  80 mg PO HS [facility]


   Last Admin: 03/13/19 22:45 Dose:  80 mg


Enoxaparin Sodium (Lovenox -)  40 mg SQ DAILY [facility]


   Last Admin: 03/14/19 10:37 Dose:  40 mg


Ferrous Sulfate (Feosol -)  325 mg PO DAILY [facility]


   Last Admin: 03/14/19 10:38 Dose:  325 mg


Furosemide (Lasix -)  40 mg PO DAILY [facility]


   Last Admin: 03/14/19 10:38 Dose:  40 mg


Guaifenesin (Robitussin -)  10 ml PO Q4H PRN


   PRN Reason: COUGH


   Last Admin: 03/12/19 10:02 Dose:  10 ml


Ceftriaxone Sodium 1 gm/ (Dextrose)  50 mls @ 100 mls/hr IVPB DAILY [facility]; 

Protocol


   Last Admin: 03/14/19 10:38 Dose:  100 mls/hr


Insulin Aspart (Novolog Vial Sliding Scale -)  0 vial SQ ACHS [facility]; Protocol


   Last Admin: 03/14/19 11:05 Dose:  2 units


Lisinopril (Prinivil)  20 mg PO DAILY [facility]


   Last Admin: 03/14/19 10:38 Dose:  20 mg


Nicotine (Nicoderm Patch -)  21 mg TD HS [facility]


   Last Admin: 03/13/19 22:46 Dose:  21 mg


Potassium Chloride (K-Dur -)  10 meq PO DAILY [facility]


   Last Admin: 03/14/19 10:38 Dose:  10 meq


Prednisone (Deltasone -)  30 mg PO BID [facility]


   Last Admin: 03/14/19 10:38 Dose:  30 mg


Ranitidine HCl (Zantac -)  300 mg PO DAILY [facility]


   Last Admin: 03/14/19 10:38 Dose:  300 mg








A/P


Acute on Chronic Hypoxic and Hypercapneic Respiratory Failure


Acute COPD Exacerbation


Morbid Obesity


Likely WILSON


Smoker





-  prednisone taper


-  inhaled bronchodilators


-  complete antibiotics


-  o2 to keep Spo2 >90%


-  smoking cessation


-  outpt PFTs, PSG


-  DVT prophylaxis


-  will need home O2


-  d/c planning

## 2019-03-14 NOTE — DS
Physical Examination


Vital Signs: 


 Vital Signs











Temperature  97.8 F   03/14/19 10:00


 


Pulse Rate  95 H  03/14/19 10:00


 


Respiratory Rate  19   03/14/19 10:00


 


Blood Pressure  142/98   03/14/19 10:00


 


O2 Sat by Pulse Oximetry (%)  98   03/14/19 09:00











Constitutional: Yes: Calm


Cardiovascular: Yes: Regular Rate and Rhythm, S1, S2


Respiratory: Yes: Diminished


Gastrointestinal: Yes: Normal Bowel Sounds, Soft


Labs: 


 CBC, BMP





 03/12/19 06:30 





 03/12/19 06:30 











Discharge Summary


Reason For Visit: SHORTNESS OF BREATH


Current Active Problems





Acute on chronic respiratory failure with hypoxia and hypercapnia (Acute)


CHF (congestive heart failure) (Acute)


COPD exacerbation (Acute)


HTN (hypertension) (Acute)


SOB (shortness of breath) (Acute)


Tobacco abuse (Acute)


Tobacco abuse counseling (Acute)








Other Procedures: echo left ventricle systolic function normal 50-55%.  chest 

ct mild copd and centrilobular emphysema.  4mm nodule in left lower lobe


Hospital Course: 





CHIEF COMPLAINT: shortness of breath 





PCP: Loraill 





HISTORY OF PRESENT ILLNESS:


63 man long time smoker c/o increasing shortness of breath for the past 2 weeks

, worse with climbing stairs and other exertion. He denied significant cough or 

overt chest pain. He endorsed+ orthopnea, sleeping on 2 pillows at least. Pt 

recalls being diagnosed with chf in the past. 





ER course was notable for:


(1) ekg 


(2) nebulizer tx 


(3) solumedrol 





copd exacerbation and hypoxic failure  completed iv abx course


now on po prednsione taper


needs oxygen with exercise and ambulation


Condition: Guarded





- Instructions


Diet, Activity, Other Instructions: 


repeat chest ct scan in 3 months to monitor size of  pulm nodule


Referrals: 


Mendez Junior MD [Staff Physician] - 


Disposition: SKILLED NURSING FACILITY





- Home Medications


Comprehensive Discharge Medication List: 


Ambulatory Orders





Atorvastatin Ca [Lipitor] 80 mg PO HS 03/07/19 


Ferrous Sulfate 325 mg PO DAILY 03/07/19 


Furosemide [Lasix] 40 mg PO DAILY 03/07/19 


Lisinopril 10 mg PO DAILY 03/07/19 


Potassium Chloride 10 meq PO DAILY 03/07/19 


Ranitidine [Zantac -] 300 mg PO ONCE 03/07/19 


Albuterol 2.5/Ipratropium 0.5 [Duoneb -] 1 amp NEB PRN 03/08/19 


Prednisone [Deltasone] 20 mg PO DAILY 03/08/19

## 2019-03-15 RX ADMIN — FERROUS SULFATE TAB EC 324 MG (65 MG FE EQUIVALENT) SCH MG: 324 (65 FE) TABLET DELAYED RESPONSE at 09:05

## 2019-03-15 RX ADMIN — ATORVASTATIN CALCIUM SCH MG: 80 TABLET, FILM COATED ORAL at 21:08

## 2019-03-15 RX ADMIN — NICOTINE SCH MG: 21 PATCH TRANSDERMAL at 21:07

## 2019-03-15 RX ADMIN — INSULIN ASPART SCH: 100 INJECTION, SOLUTION INTRAVENOUS; SUBCUTANEOUS at 17:02

## 2019-03-15 RX ADMIN — IPRATROPIUM BROMIDE AND ALBUTEROL SULFATE SCH AMP: .5; 3 SOLUTION RESPIRATORY (INHALATION) at 07:56

## 2019-03-15 RX ADMIN — PREDNISONE SCH MG: 20 TABLET ORAL at 21:08

## 2019-03-15 RX ADMIN — FUROSEMIDE SCH MG: 40 TABLET ORAL at 09:05

## 2019-03-15 RX ADMIN — ENOXAPARIN SODIUM SCH MG: 40 INJECTION SUBCUTANEOUS at 09:06

## 2019-03-15 RX ADMIN — IPRATROPIUM BROMIDE AND ALBUTEROL SULFATE SCH AMP: .5; 3 SOLUTION RESPIRATORY (INHALATION) at 11:32

## 2019-03-15 RX ADMIN — RANITIDINE SCH MG: 150 TABLET ORAL at 09:05

## 2019-03-15 RX ADMIN — INSULIN ASPART SCH: 100 INJECTION, SOLUTION INTRAVENOUS; SUBCUTANEOUS at 21:08

## 2019-03-15 RX ADMIN — PREDNISONE SCH MG: 10 TABLET ORAL at 09:05

## 2019-03-15 RX ADMIN — LISINOPRIL SCH MG: 20 TABLET ORAL at 09:05

## 2019-03-15 RX ADMIN — POTASSIUM CHLORIDE SCH MEQ: 750 TABLET, EXTENDED RELEASE ORAL at 09:05

## 2019-03-15 RX ADMIN — INSULIN ASPART SCH: 100 INJECTION, SOLUTION INTRAVENOUS; SUBCUTANEOUS at 06:15

## 2019-03-15 RX ADMIN — IPRATROPIUM BROMIDE AND ALBUTEROL SULFATE SCH AMP: .5; 3 SOLUTION RESPIRATORY (INHALATION) at 16:37

## 2019-03-15 RX ADMIN — INSULIN ASPART SCH: 100 INJECTION, SOLUTION INTRAVENOUS; SUBCUTANEOUS at 11:56

## 2019-03-15 RX ADMIN — IPRATROPIUM BROMIDE AND ALBUTEROL SULFATE SCH AMP: .5; 3 SOLUTION RESPIRATORY (INHALATION) at 20:28

## 2019-03-15 NOTE — PN
Progress Note, Physician


Chief Complaint: 





patient seen and examined








- Current Medication List


Current Medications: 


Active Medications





Albuterol Sulfate (Ventolin 0.083% Nebulizer Soln -)  1 amp NEB Q4H PRN


   PRN Reason: SHORT OF BREATH/WHEEZING


   Last Admin: 03/11/19 08:53 Dose:  1 amp


Albuterol/Ipratropium (Duoneb -)  1 amp NEB RQID [facility]


   Last Admin: 03/15/19 11:32 Dose:  1 amp


Atorvastatin Calcium (Lipitor -)  80 mg PO HS [facility]


   Last Admin: 03/14/19 22:39 Dose:  80 mg


Enoxaparin Sodium (Lovenox -)  40 mg SQ DAILY [facility]


   Last Admin: 03/15/19 09:06 Dose:  40 mg


Ferrous Sulfate (Feosol -)  325 mg PO DAILY [facility]


   Last Admin: 03/15/19 09:05 Dose:  325 mg


Furosemide (Lasix -)  40 mg PO DAILY [facility]


   Last Admin: 03/15/19 09:05 Dose:  40 mg


Guaifenesin (Robitussin -)  10 ml PO Q4H PRN


   PRN Reason: COUGH


   Last Admin: 03/12/19 10:02 Dose:  10 ml


Insulin Aspart [facility]iding [facility] -[facility]  [facility]; Protocol


   Last Admin: 03/15/19 11:56 Dose:  Not Given


Lisinopril (Prinivil)  20 mg PO DAILY [facility]


   Last Admin: 03/15/19 09:05 Dose:  20 mg


Nicotine (Nicoderm Patch -)  21 mg TD [facility]


   Last Admin: 03/14/19 22:39 Dose:  21 mg


Potassium Chloride (K-Dur -)  10 meq PO DAILY [facility]


   Last Admin: 03/15/19 09:05 Dose:  10 meq


Prednisone (Deltasone -)  30 mg PO BID [facility]


   Last Admin: 03/15/19 09:05 Dose:  30 mg


Ranitidine HCl (Zantac -)  300 mg PO DAILY [facility]


   Last Admin: 03/15/19 09:05 Dose:  300 mg











- Objective


Vital Signs: 


 Vital Signs











Temperature  97.9 F   03/15/19 10:00


 


Pulse Rate  94 H  03/15/19 10:00


 


Respiratory Rate  20   03/15/19 10:00


 


Blood Pressure  132/85   03/15/19 10:00


 


O2 Sat by Pulse Oximetry (%)  99   03/15/19 11:30











Constitutional: Yes: Calm


Cardiovascular: Yes: Regular Rate and Rhythm, S1, S2


Respiratory: Yes: Rhonchi, Wheezes (scaterred wheezes)


Neurological: Yes: Alert, Oriented


Labs: 


 CBC, BMP





 03/12/19 06:30 





 03/14/19 14:20 





 INR, PTT











INR  1.05  (0.83-1.09)   03/07/19  17:15    














Problem List





- Problems


(1) SOB (shortness of breath)


Assessment/Plan: 


oxygen 3 litres


will have him ambulate and see if he still require  6 litres on ambulation


steroids 30mg po bid


iv abx


chest ct copd with centrilobular emphysema


lovenox


lasix


echo report noted EF 55% left ventricle systolic function is normal


Code(s): R06.02 - SHORTNESS OF BREATH   





(2) CHF (congestive heart failure)


Assessment/Plan: 


lasix


lisinopril


Code(s): I50.9 - HEART FAILURE, UNSPECIFIED   





Assessment/Plan





awaiting for pulm rehab placement


leukocytosis secondary to steroids


check oxygen when he ambulates

## 2019-03-15 NOTE — PN
Progress Note (short form)





- Note


Progress Note: 


Sitting in bed on 3 L NC O2. 


Overall breathing has improved. 


Some residual dry cough. 





 Intake & Output











 03/12/19 03/13/19 03/14/19 03/15/19





 23:59 23:59 23:59 23:59


 


Intake Total 1190 1540 1160 360


 


Output Total   500 


 


Balance 1190 1540 660 360


 


Weight 243 lb 2 oz 245 lb 245 lb 245 lb








 Last Vital Signs











Temp Pulse Resp BP Pulse Ox


 


 97.9 F   94 H  20   132/85   99 


 


 03/15/19 10:00  03/15/19 10:00  03/15/19 10:00  03/15/19 10:00  03/15/19 11:30








Active Medications





Albuterol Sulfate (Ventolin 0.083% Nebulizer Soln -)  1 amp NEB Q4H PRN


   PRN Reason: SHORT OF BREATH/WHEEZING


   Last Admin: 03/11/19 08:53 Dose:  1 amp


Albuterol/Ipratropium (Duoneb -)  1 amp NEB RQID [facility]


   Last Admin: 03/15/19 11:32 Dose:  1 amp


Atorvastatin Calcium (Lipitor -)  80 mg PO HS [facility]


   Last Admin: 03/14/19 22:39 Dose:  80 mg


Enoxaparin Sodium (Lovenox -)  40 mg SQ DAILY [facility]


   Last Admin: 03/15/19 09:06 Dose:  40 mg


Ferrous Sulfate (Feosol -)  325 mg PO DAILY [facility]


   Last Admin: 03/15/19 09:05 Dose:  325 mg


Furosemide (Lasix -)  40 mg PO DAILY [facility]


   Last Admin: 03/15/19 09:05 Dose:  40 mg


Guaifenesin (Robitussin -)  10 ml PO Q4H PRN


   PRN Reason: COUGH


   Last Admin: 03/12/19 10:02 Dose:  10 ml


Insulin Aspart (Novolog Vial Sliding Scale -)  0 vial SQ ACHS [facility]; Protocol


   Last Admin: 03/15/19 11:56 Dose:  Not Given


Lisinopril (Prinivil)  20 mg PO DAILY [facility]


   Last Admin: 03/15/19 09:05 Dose:  20 mg


Nicotine (Nicoderm Patch -)  21 mg TD HS [facility]


   Last Admin: 03/14/19 22:39 Dose:  21 mg


Potassium Chloride (K-Dur -)  10 meq PO DAILY [facility]


   Last Admin: 03/15/19 09:05 Dose:  10 meq


Prednisone (Deltasone -)  20 mg PO BID [facility]


Ranitidine HCl (Zantac -)  300 mg PO DAILY [facility]


   Last Admin: 03/15/19 09:05 Dose:  300 mg








Gen:  NAD at rest


Heart: RRR


Lung: distant breath sounds, no wheezes


Abd: soft, nontender


Ext: no edema





  


 Laboratory Results - last 24 hr











  03/14/19 03/14/19 03/14/19





  14:20 17:16 22:38


 


Sodium  137  


 


Potassium  4.7  


 


Chloride  96 L  


 


Carbon Dioxide  36 H  


 


Anion Gap  5 L  


 


BUN  20 H  


 


Creatinine  0.9  


 


Creat Clearance w eGFR  84.69  


 


POC Glucometer   152  132


 


Random Glucose  90  


 


Calcium  8.7  


 


Total Bilirubin  0.1 L  


 


AST  13 L  


 


ALT  36  


 


Alkaline Phosphatase  77  


 


Total Protein  7.0  


 


Albumin  3.2 L  














  03/15/19 03/15/19





  05:40 11:54


 


Sodium  


 


Potassium  


 


Chloride  


 


Carbon Dioxide  


 


Anion Gap  


 


BUN  


 


Creatinine  


 


Creat Clearance w eGFR  


 


POC Glucometer  136  107


 


Random Glucose  


 


Calcium  


 


Total Bilirubin  


 


AST  


 


ALT  


 


Alkaline Phosphatase  


 


Total Protein  


 


Albumin  














A/P


Acute on Chronic Hypoxic and Hypercapneic Respiratory Failure


Acute COPD Exacerbation


Morbid Obesity


Likely WILSON


Smoker





-  prednisone taper


-  inhaled bronchodilators


-  complete antibiotics


-  O2 to keep Spo2 >90%


-  smoking cessation


-  outpt PFTs, PSG


-  DVT prophylaxis


-  will need home O2


-  No Pulmonary contraindication for D/C planning





Dr Culver

## 2019-03-16 VITALS — TEMPERATURE: 97.9 F | DIASTOLIC BLOOD PRESSURE: 86 MMHG | HEART RATE: 89 BPM | SYSTOLIC BLOOD PRESSURE: 124 MMHG

## 2019-03-16 RX ADMIN — FUROSEMIDE SCH MG: 40 TABLET ORAL at 10:54

## 2019-03-16 RX ADMIN — IPRATROPIUM BROMIDE AND ALBUTEROL SULFATE SCH AMP: .5; 3 SOLUTION RESPIRATORY (INHALATION) at 08:02

## 2019-03-16 RX ADMIN — ENOXAPARIN SODIUM SCH MG: 40 INJECTION SUBCUTANEOUS at 10:53

## 2019-03-16 RX ADMIN — INSULIN ASPART SCH: 100 INJECTION, SOLUTION INTRAVENOUS; SUBCUTANEOUS at 11:15

## 2019-03-16 RX ADMIN — FERROUS SULFATE TAB EC 324 MG (65 MG FE EQUIVALENT) SCH MG: 324 (65 FE) TABLET DELAYED RESPONSE at 10:54

## 2019-03-16 RX ADMIN — IPRATROPIUM BROMIDE AND ALBUTEROL SULFATE SCH AMP: .5; 3 SOLUTION RESPIRATORY (INHALATION) at 11:41

## 2019-03-16 RX ADMIN — PREDNISONE SCH MG: 20 TABLET ORAL at 10:53

## 2019-03-16 RX ADMIN — LISINOPRIL SCH MG: 20 TABLET ORAL at 10:53

## 2019-03-16 RX ADMIN — RANITIDINE SCH MG: 150 TABLET ORAL at 10:53

## 2019-03-16 RX ADMIN — INSULIN ASPART SCH: 100 INJECTION, SOLUTION INTRAVENOUS; SUBCUTANEOUS at 06:28

## 2019-03-16 RX ADMIN — POTASSIUM CHLORIDE SCH MEQ: 750 TABLET, EXTENDED RELEASE ORAL at 10:54

## 2019-03-16 NOTE — PN
Progress Note (short form)





- Note


Progress Note: 





PULMONARY





Denies shortness of breath, cough or wheezing.





 Vital Signs











 Period  Temp  Pulse  Resp  BP Sys/Diaz  Pulse Ox


 


 Last 24 Hr  97.4 F-97.9 F  74-89  18-21  117-142/64-92  











Gen:  NAD at rest


Heart: RRR


Lung: distant breath sounds, no wheezes


Abd: soft, nontender


Ext: no edema





 CBC, BMP





 03/12/19 06:30 





 03/14/19 14:20 








A/P


Acute on Chronic Hypoxic and Hypercapneic Respiratory Failure


Acute COPD Exacerbation


Morbid Obesity


Likely WILSON


Smoker





-  prednisone taper


-  inhaled bronchodilators


-  complete antibiotics


-  o2 to keep Spo2 >90%


-  smoking cessation


-  outpt PFTs, PSG


-  DVT prophylaxis


-  home O2


-  d/c planning

## 2019-12-26 ENCOUNTER — HOSPITAL ENCOUNTER (INPATIENT)
Dept: HOSPITAL 74 - JER | Age: 64
LOS: 5 days | Discharge: HOME HEALTH | DRG: 190 | End: 2019-12-31
Attending: FAMILY MEDICINE | Admitting: INTERNAL MEDICINE
Payer: COMMERCIAL

## 2019-12-26 VITALS — BODY MASS INDEX: 37.5 KG/M2

## 2019-12-26 DIAGNOSIS — J96.22: ICD-10-CM

## 2019-12-26 DIAGNOSIS — J44.1: Primary | ICD-10-CM

## 2019-12-26 DIAGNOSIS — N17.9: ICD-10-CM

## 2019-12-26 DIAGNOSIS — F17.210: ICD-10-CM

## 2019-12-26 DIAGNOSIS — G47.33: ICD-10-CM

## 2019-12-26 DIAGNOSIS — E66.9: ICD-10-CM

## 2019-12-26 DIAGNOSIS — I50.30: ICD-10-CM

## 2019-12-26 DIAGNOSIS — Z71.6: ICD-10-CM

## 2019-12-26 DIAGNOSIS — N28.9: ICD-10-CM

## 2019-12-26 DIAGNOSIS — D64.9: ICD-10-CM

## 2019-12-26 DIAGNOSIS — D72.829: ICD-10-CM

## 2019-12-26 DIAGNOSIS — R91.1: ICD-10-CM

## 2019-12-26 DIAGNOSIS — E78.00: ICD-10-CM

## 2019-12-26 DIAGNOSIS — J96.21: ICD-10-CM

## 2019-12-26 DIAGNOSIS — R00.0: ICD-10-CM

## 2019-12-26 DIAGNOSIS — I11.0: ICD-10-CM

## 2019-12-26 LAB
ALBUMIN SERPL-MCNC: 3.9 G/DL (ref 3.4–5)
ALP SERPL-CCNC: 78 U/L (ref 45–117)
ALT SERPL-CCNC: 26 U/L (ref 13–61)
ANION GAP SERPL CALC-SCNC: 7 MMOL/L (ref 8–16)
APTT BLD: 33.9 SECONDS (ref 25.2–36.5)
AST SERPL-CCNC: 16 U/L (ref 15–37)
BASOPHILS # BLD: 1.5 % (ref 0–2)
BILIRUB SERPL-MCNC: 0.1 MG/DL (ref 0.2–1)
BNP SERPL-MCNC: 58.6 PG/ML (ref 5–125)
BUN SERPL-MCNC: 18 MG/DL (ref 7–18)
CALCIUM SERPL-MCNC: 9.3 MG/DL (ref 8.5–10.1)
CHLORIDE SERPL-SCNC: 99 MMOL/L (ref 98–107)
CO2 SERPL-SCNC: 33 MMOL/L (ref 21–32)
CREAT SERPL-MCNC: 1.6 MG/DL (ref 0.55–1.3)
DEPRECATED RDW RBC AUTO: 16.2 % (ref 11.9–15.9)
EOSINOPHIL # BLD: 0.5 % (ref 0–4.5)
GLUCOSE SERPL-MCNC: 135 MG/DL (ref 74–106)
HCT VFR BLD CALC: 37.5 % (ref 35.4–49)
HGB BLD-MCNC: 11.4 GM/DL (ref 11.7–16.9)
INR BLD: 0.93 (ref 0.83–1.09)
LYMPHOCYTES # BLD: 15.8 % (ref 8–40)
MAGNESIUM SERPL-MCNC: 2.2 MG/DL (ref 1.8–2.4)
MCH RBC QN AUTO: 27.7 PG (ref 25.7–33.7)
MCHC RBC AUTO-ENTMCNC: 30.4 G/DL (ref 32–35.9)
MCV RBC: 91 FL (ref 80–96)
MONOCYTES # BLD AUTO: 7.2 % (ref 3.8–10.2)
NEUTROPHILS # BLD: 75 % (ref 42.8–82.8)
PH BLDV: 7.3 [PH] (ref 7.31–7.41)
PLATELET # BLD AUTO: 277 K/MM3 (ref 134–434)
PMV BLD: 8.5 FL (ref 7.5–11.1)
POTASSIUM SERPLBLD-SCNC: 4.1 MMOL/L (ref 3.5–5.1)
PROT SERPL-MCNC: 7.7 G/DL (ref 6.4–8.2)
PT PNL PPP: 11 SEC (ref 9.7–13)
RBC # BLD AUTO: 4.12 M/MM3 (ref 4–5.6)
SODIUM SERPL-SCNC: 138 MMOL/L (ref 136–145)
VENOUS PC02: 67.3 MMHG (ref 38–52)
VENOUS PO2: 109 MMHG (ref 28–48)
WBC # BLD AUTO: 13.7 K/MM3 (ref 4–10)

## 2019-12-26 NOTE — PDOC
History of Present Illness





- General


Chief Complaint: Shortness of Breath


Stated Complaint: CHEAT PAIN,DIFF. BREATHING


Time Seen by Provider: 12/26/19 20:09





- History of Present Illness


Initial Comments: 





12/26/19 20:42


HPI: 


65 y/o M with hx of CHF, COPD, HTN presenting with SOB. He reports cough for 

the past 2-3 days productive of clear thick productive sputum. SOB has been 

worsening over that time period. He has attempted home nebulzier treatments 

without improvement. He saw Dr Villegas earlier and was increased on his lasix 

dosage due to increasing pitting edema. He now reports BL lwoer chest pain that 

is worse on exertion. He states the last time he has had similar symptoms was  

03/2019 when he was admitted. He denies fever, chills, HA, LH, abd pain, n/v, 

diaphoresis, palpitations, sore throat, congestion





PMHx: as noted above


ROS: as noted


SHx: +tobacco use; no alcohol use; no rec drugs


Allergies: NKDA








ROS: 


GENERAL/CONSTITUTIONAL: No fever or chills. No weakness.


HEAD, EYES, EARS, NOSE AND THROAT: No change in vision. No ear pain or 

discharge. No sore throat.


CARDIOVASCULAR: +chest pain, shortness of breath


RESPIRATORY: +cough, wheezing; no hemoptysis.


GASTROINTESTINAL: No nausea, vomiting, diarrhea or constipation.


GENITOURINARY: No dysuria, frequency, or change in urination.


MUSCULOSKELETAL: No joint or muscle swelling or pain. No neck or back pain.


SKIN: No rash


NEUROLOGIC: No headache, vertigo, loss of consciousness, or change in strength/

sensation.


ENDOCRINE: No increased thirst. No abnormal weight change


HEMATOLOGIC/LYMPHATIC: No anemia, easy bleeding, or history of blood clots.


ALLERGIC/IMMUNOLOGIC: No hives or skin allergy.








PE: 


GENERAL: Awake, alert, and fully oriented, severe respiratory distress


HEAD: No signs of trauma, normocephalic, atraumatic 


EYES: EOMI, sclera anicteric, conjunctiva clear


ENT: Auricles normal inspection, hearing grossly normal, nares patent, 

oropharynx clear without exudates. Moist mucosa


NECK: Normal ROM, no lymphadenopathy


LUNGS: significantly increased work of breathing with grunting and accessory 

muscle use, wheezes throughout all lung fields 


HEART: tachcyardia and regular rhythm, normal S1 and S2, no murmur, peripheral 

pulses 2+ and equal bilaterally. 


ABDOMEN: Soft, nondistended, nontender, normoactive bowel sounds. No guarding, 

no rebound. No masses. No CVAT


MUSCULOSKELETAL: Normal inspection, FROM


NEUROLOGICAL: Cranial nerves II through XII grossly intact. Normal speech, 

normal gait, no focal sensorimotor deficits 


SKIN: Warm, Dry, normal turgor, no rashes or lesions noted








Past History





- Past Medical History


Allergies/Adverse Reactions: 


 Allergies











Allergy/AdvReac Type Severity Reaction Status Date / Time


 


No Known Allergies Allergy   Verified 12/26/19 20:14











Home Medications: 


Ambulatory Orders





Atorvastatin Ca [Lipitor] 80 mg PO HS 03/07/19 


Furosemide [Lasix] 40 mg PO DAILY 03/07/19 


Ranitidine [Zantac -] 300 mg PO ONCE 03/07/19 


Albuterol 2.5/Ipratropium 0.5 [Duoneb -] 1 amp NEB PRN 03/08/19 


Lisinopril [Prinivil] 20 mg PO DAILY #30 tablet 03/16/19 


Albuterol Sulfate Inhaler - [Ventolin Hfa Inhaler -] 1 inh PO Q4H PRN 12/26/19 


Prednisone 20 mg PO DAILY 12/26/19 








Asthma: Yes


COPD: Yes


CHF: Yes


HTN: Yes


Hypercholesterolemia: Yes





- Surgical History


Appendectomy: Yes





- Psycho Social/Smoking Cessation Hx


Smoking History: Current every day smoker


Have you smoked in the past 12 months: No


Number of Cigarettes Smoked Daily: 20


Information on smoking cessation initiated: No


'Breaking Loose' booklet given: 03/08/19


Hx Alcohol Use: No


Drug/Substance Use Hx: No


Substance Use Type: None


Hx Substance Use Treatment: No





*Physical Exam





- Vital Signs


 Last Vital Signs











Temp Pulse Resp BP Pulse Ox


 


 97.6 F   104 H  28 H  155/95   92 L


 


 12/26/19 19:55  12/26/19 19:55  12/26/19 19:55  12/26/19 19:55  12/26/19 19:55














ED Treatment Course





- LABORATORY


CBC & Chemistry Diagram: 


 12/26/19 20:40





 12/26/19 20:15





Medical Decision Making





- Medical Decision Making





12/26/19 20:52


65 y/o M with hx of CHF, COPD, HTN presenting with SOB and increased WOB 

associated with chest pain and cough. , O2 100% RA, AF. PE with increased 

WOB with accessory muscle use and wheezes throughout. DDx includes COPD 

exacerabation, CHF exacerbation, PNA.





-cbc, cmp, lipase, vbg, blood cx, bnp, trop, ekg, cxr


-albuterol x3, solumedrol, bipap, ofrimev





12/26/19 22:11


wbc 13.7


Cr 1.4 from 0.9


cxr with no acute pathology


ekg with sinus tach, nl itnervals, biatrial enlargement, no selam/d


bedside us with no b-lines or pleural effusion


patient improved on bipap and now speaking full sentences, less diaphoretic


will admit to [facility]; pending [facility]





12/26/19 23:56


admitted to Dr Trammell








Discharge





- Discharge Information


Problems reviewed: Yes


Clinical Impression/Diagnosis: 


 SOB (shortness of breath), COPD exacerbation, Cough, Respiratory distress








- Follow up/Referral





- Patient Discharge Instructions





- Post Discharge Activity

## 2019-12-26 NOTE — PDOC
Documentation entered by Vero Priest SCRIBE, acting as scribe for Sugey Alberto DO.








Sugey Alberto DO:  This documentation has been prepared by the leesa, 

Vero Priest SCRIBE, under my direction and personally reviewed by me in its 

entirety.  I confirm that the documentation accurately reflects all work, 

treatment, procedures, and medical decision making performed by me.  





Attending Attestation





- Resident


Resident Name: Lydia Stanley





- ED Attending Attestation


I have performed the following: I have examined & evaluated the patient, The 

case was reviewed & discussed with the resident, I agree w/resident's findings 

& plan, Exceptions are as noted





- HPI


HPI: 





12/26/19 21:04


This is a 64 year old man with a significant PMH of CHF, COPD, active smoker, 

who presents to the ED with SOB. Pt reports coughing up clear productive 

sputum. Pt states he saw  earlier and increased his lasix dosage due to 

his edma with no relief of symptoms prompting him to the ER. Pt was admitted 

with similar symptoms on 3/20/19. He denies any recent fevers, chills, headache 

or dizziness. He denies any recent nausea, vomiting, diarrhea or 

constipation.He denies any recent dysuria, frequency, urgency or hematuria.














- Physicial Exam


PE: 





12/26/19 21:16


Gen: aaox3, resp distress, tachypnic, tachy, diaphoretic


heart: +s1s2 tachy


lungs: diffuse wheezing on exam, abd muscle breathing, conversational dyspnea, 

severe resp distress


abd: soft, nt/nd +bs


ext: 2+ pitting edema to b/l LE





- Medical Decision Making





12/26/19 21:17


a/p: 65yo male with weeks of increasing sob despite increased lasix dose by pmd


-pt in resp distress


-concern for mixed copd/chf exacerbation


-will start with nebs


-pt immediately placed on bipap to assist with work of breathing


-pt also given solumedrol


-will send labs, cultures - coughing up white phlegm


-will most likely also need lasix


-will monitor and reassess


12/26/19 21:55


pt with copd exacerbation


jose luis


pt on bipap and feeling better


no longer diaphoretic, no longer resp distress


feels much better, speaking in full sentences


cxr clear


azithro ordered


jose luis-suspect prerenal from increased diuretic dose


bnp 58


12/26/19 21:56





12/26/19 21:56


microblog sent to [facility] for admission


12/26/19 23:44


case discussed with [facility] who accepts pt to service





Discharge





- Discharge Information


Problems reviewed: Yes


Clinical Impression/Diagnosis: 


 SOB (shortness of breath), COPD exacerbation, Cough, Respiratory distress





Condition: Guarded





- Admission


Yes





- Follow up/Referral


Referrals: 


Luigi Perez MD [Primary Care Provider] - 





- Patient Discharge Instructions





- Post Discharge Activity





**Heart Score/ECG Review





- ECG Intrepretation


Comment:: 





12/26/19 21:19


sinus tach at 107, nl axis, biatrial enlargement, no acute st/t wave findings

## 2019-12-27 LAB
ANION GAP SERPL CALC-SCNC: 3 MMOL/L (ref 8–16)
ANION GAP SERPL CALC-SCNC: 4 MMOL/L (ref 8–16)
ARTERIAL BLOOD GAS PCO2: 63.7 MMHG (ref 35–45)
ARTERIAL PATENCY WRIST A: POSITIVE
BASE EXCESS BLDA CALC-SCNC: 8.5 MEQ/L (ref -2–2)
BUN SERPL-MCNC: 14.7 MG/DL (ref 7–18)
BUN SERPL-MCNC: 16.9 MG/DL (ref 7–18)
CALCIUM SERPL-MCNC: 9.2 MG/DL (ref 8.5–10.1)
CALCIUM SERPL-MCNC: 9.6 MG/DL (ref 8.5–10.1)
CHLORIDE SERPL-SCNC: 96 MMOL/L (ref 98–107)
CHLORIDE SERPL-SCNC: 99 MMOL/L (ref 98–107)
CO2 SERPL-SCNC: 35 MMOL/L (ref 21–32)
CO2 SERPL-SCNC: 36 MMOL/L (ref 21–32)
CREAT SERPL-MCNC: 0.9 MG/DL (ref 0.55–1.3)
CREAT SERPL-MCNC: 1 MG/DL (ref 0.55–1.3)
DEPRECATED RDW RBC AUTO: 15.9 % (ref 11.9–15.9)
GLUCOSE SERPL-MCNC: 126 MG/DL (ref 74–106)
GLUCOSE SERPL-MCNC: 172 MG/DL (ref 74–106)
HCT VFR BLD CALC: 36.1 % (ref 35.4–49)
HGB BLD-MCNC: 11.2 GM/DL (ref 11.7–16.9)
MCH RBC QN AUTO: 27.8 PG (ref 25.7–33.7)
MCHC RBC AUTO-ENTMCNC: 30.9 G/DL (ref 32–35.9)
MCV RBC: 89.9 FL (ref 80–96)
PLATELET # BLD AUTO: 271 K/MM3 (ref 134–434)
PMV BLD: 8 FL (ref 7.5–11.1)
PO2 BLDA: 67.7 MMHG (ref 80–100)
POTASSIUM SERPLBLD-SCNC: 4.9 MMOL/L (ref 3.5–5.1)
POTASSIUM SERPLBLD-SCNC: 5.3 MMOL/L (ref 3.5–5.1)
RBC # BLD AUTO: 4.02 M/MM3 (ref 4–5.6)
SAO2 % BLDA: 92.2 % (ref 95–98)
SODIUM SERPL-SCNC: 135 MMOL/L (ref 136–145)
SODIUM SERPL-SCNC: 137 MMOL/L (ref 136–145)
WBC # BLD AUTO: 12 K/MM3 (ref 4–10)

## 2019-12-27 RX ADMIN — HYDROCORTISONE SODIUM SUCCINATE SCH MG: 100 INJECTION, POWDER, FOR SOLUTION INTRAMUSCULAR; INTRAVENOUS at 02:22

## 2019-12-27 RX ADMIN — METHYLPREDNISOLONE SODIUM SUCCINATE SCH MG: 40 INJECTION, POWDER, FOR SOLUTION INTRAMUSCULAR; INTRAVENOUS at 17:09

## 2019-12-27 RX ADMIN — ATORVASTATIN CALCIUM SCH MG: 80 TABLET, FILM COATED ORAL at 21:57

## 2019-12-27 RX ADMIN — IPRATROPIUM BROMIDE AND ALBUTEROL SULFATE SCH AMP: .5; 3 SOLUTION RESPIRATORY (INHALATION) at 20:45

## 2019-12-27 RX ADMIN — METHYLPREDNISOLONE SODIUM SUCCINATE SCH MG: 40 INJECTION, POWDER, FOR SOLUTION INTRAMUSCULAR; INTRAVENOUS at 21:57

## 2019-12-27 RX ADMIN — HYDROCORTISONE SODIUM SUCCINATE SCH MG: 100 INJECTION, POWDER, FOR SOLUTION INTRAMUSCULAR; INTRAVENOUS at 10:15

## 2019-12-27 RX ADMIN — IPRATROPIUM BROMIDE AND ALBUTEROL SULFATE SCH AMP: .5; 3 SOLUTION RESPIRATORY (INHALATION) at 16:42

## 2019-12-27 RX ADMIN — METHYLPREDNISOLONE SODIUM SUCCINATE SCH MG: 40 INJECTION, POWDER, FOR SOLUTION INTRAMUSCULAR; INTRAVENOUS at 14:09

## 2019-12-27 RX ADMIN — AZITHROMYCIN DIHYDRATE SCH MLS/HR: 500 INJECTION, POWDER, LYOPHILIZED, FOR SOLUTION INTRAVENOUS at 10:15

## 2019-12-27 RX ADMIN — HEPARIN SODIUM SCH UNIT: 5000 INJECTION, SOLUTION INTRAVENOUS; SUBCUTANEOUS at 21:57

## 2019-12-27 NOTE — HP
CHIEF COMPLAINT: shortness of breath for 1 week





PCP:Dr. Perez





HISTORY OF PRESENT ILLNESS:


64 year old male with a significant past medical history of congestive heart 

failure(diastolic versus systolic unknown) , COPD, and active smoker who 

presents to the ED with SOB ongoing for a few weeks. Patient reported coughing 

up clear productive sputum. Pt stated he saw  earlier and he increased 

his lasix dosage due to his edema with no relief of symptoms prompting him to 

the ER. Patient  was admitted with similar symptoms on 3/20/19. He denies any 

recent fevers, chills, chest pain, headache or dizziness. 





ER course was notable for:


(1)COPD Exacerbation- placed on BIPAP, received IV steroid and antibiotic


(2)Acute Renal Insufficiency 


(3)Tachycardia





Recent Travel:


no





PAST MEDICAL HISTORY:


congestive heart failure


COPD





PAST SURGICAL HISTORY:


none





Social History:


Smoking:yes


Alcohol:no


Drugs: no





Allergies


No Known Allergies Allergy (Verified 12/26/19 20:14)


 








HOME MEDICATIONS:


 Home Medications











 Medication  Instructions  Recorded


 


Atorvastatin Ca [Lipitor] 80 mg PO HS 03/07/19


 


Furosemide [Lasix] 40 mg PO DAILY 03/07/19


 


Ranitidine [Zantac -] 300 mg PO DAILY 03/07/19


 


Albuterol 2.5/Ipratropium 0.5 1 amp NEB PRN PRN 03/08/19





[Duoneb -]  


 


Lisinopril [Prinivil] 20 mg PO DAILY #30 tablet 03/16/19


 


Albuterol Sulfate Inhaler - 1 inh PO Q4H PRN 12/26/19





[Ventolin Hfa Inhaler -]  


 


Prednisone 20 mg PO DAILY 12/26/19








REVIEW OF SYSTEMS


CONSTITUTIONAL: 


Absent:  fever, chills, diaphoresis, generalized weakness, malaise, loss of 

appetite, weight change


HEENT: 


Absent:  rhinorrhea, nasal congestion, throat pain, throat swelling, difficulty 

swallowing, mouth swelling, ear pain, eye pain, visual changes


CARDIOVASCULAR: 


Absent: chest pain, syncope, palpitations, irregular heart rate, lightheadedness

, peripheral edema


RESPIRATORY: 


Absent: cough, shortness of breath, dyspnea with exertion, orthopnea, wheezing, 

stridor, hemoptysis


GASTROINTESTINAL:


Absent: abdominal pain, abdominal distension, nausea, vomiting, diarrhea, 

constipation, melena, hematochezia


GENITOURINARY: 


Absent: dysuria, frequency, urgency, hesitancy, hematuria, flank pain, genital 

pain


MUSCULOSKELETAL: 


Absent: myalgia, arthralgia, joint swelling, back pain, neck pain


SKIN: 


Absent: rash, itching, pallor


HEMATOLOGIC/IMMUNOLOGIC: 


Absent: easy bleeding, easy bruising, lymphadenopathy, frequent infections


ENDOCRINE:


Absent: unexplained weight gain, unexplained weight loss, heat intolerance, 

cold intolerance


NEUROLOGIC: 


Absent: headache, focal weakness or paresthesias, dizziness, unsteady gait, 

seizure, mental status changes, bladder or bowel incontinence


PSYCHIATRIC: 


Absent: anxiety, depression, suicidal or homicidal ideation, hallucinations.








PHYSICAL EXAMINATION


 Vital Signs - 24 hr











  12/26/19 12/26/19 12/27/19





  19:55 20:45 01:07


 


Temperature 97.6 F  


 


Pulse Rate 104 H  


 


Respiratory 28 H  





Rate   


 


Blood Pressure 155/95  


 


O2 Sat by Pulse 92 L 100 98





Oximetry (%)   











GENERAL: awake alert and fully oriented no acute distress


HEAD: normal 


EYES: pupils equal, round and reactive to light extraocular movements intact


EARS, NOSE, THROAT: ears normal nares patent oropharynx clear without exudates 

moist mucous membranes


NECK no JVD


LUNGS: clear to auscultation bilaterally no accessory muscle BIPAP machine  in 

use 


HEART: rate tachycardic normal S1 and S2


ABDOMEN: large and distended no guarding bowel sounds present 


MUSCULOSKELETAL: normal range of motion 


UPPER EXTREMITIES: 2+ pulses warm well-perfused no cyanosis


LOWER EXTREMITIES: 2+ pulses warm on palpation 1+ edema present bilaterally 


NEUROLOGICAL:  normal speech no facial grimace no facial droop moving upper and 

lower extremities 


PSYCHIATRIC: cooperative 


SKIN: warm dry normal turgor no rashes or lesions


 Laboratory Results - last 24 hr











  12/26/19 12/26/19 12/26/19





  20:15 20:15 20:15


 


WBC   


 


RBC   


 


Hgb   


 


Hct   


 


MCV   


 


MCH   


 


MCHC   


 


RDW   


 


Plt Count   


 


MPV   


 


Absolute Neuts (auto)   


 


Neutrophils %   


 


Lymphocytes %   


 


Monocytes %   


 


Eosinophils %   


 


Basophils %   


 


Nucleated RBC %   


 


PT with INR   


 


INR   


 


PTT (Actin FS)   


 


VBG pH  7.30 L  


 


POC VBG pCO2  67.3 H  


 


POC VBG pO2  109 H  


 


VBG HCO3  31.9 H  


 


VBG O2 Sat (Yared)  97.2 H  


 


VBG Base Excess  4.1 H  


 


Sodium    138


 


Potassium    4.1


 


Chloride    99


 


Carbon Dioxide    33 H


 


Anion Gap    7 L


 


BUN    18.0


 


Creatinine    1.6 H


 


Est GFR (CKD-EPI)AfAm    51.27


 


Est GFR (CKD-EPI)NonAf    44.24


 


Random Glucose    135 H


 


Lactic Acid   


 


Calcium    9.3


 


Magnesium    2.2


 


Total Bilirubin    0.1 L


 


AST    16


 


ALT    26


 


Alkaline Phosphatase    78


 


Creatine Kinase   454 H 


 


Creatine Kinase Index   1.2 


 


CK-MB (CK-2)   5.8 H 


 


Troponin I   < 0.02 


 


B-Natriuretic Peptide    58.6


 


Total Protein    7.7


 


Albumin    3.9














  12/26/19 12/26/19 12/26/19





  20:15 20:40 20:40


 


WBC   13.7 H 


 


RBC   4.12 


 


Hgb   11.4 L 


 


Hct   37.5 


 


MCV   91.0 


 


MCH   27.7 


 


MCHC   30.4 L 


 


RDW   16.2 H 


 


Plt Count   277 


 


MPV   8.5 


 


Absolute Neuts (auto)   10.3 H 


 


Neutrophils %   75.0 


 


Lymphocytes %   15.8  D 


 


Monocytes %   7.2  D 


 


Eosinophils %   0.5  D 


 


Basophils %   1.5  D 


 


Nucleated RBC %   0 


 


PT with INR  11.00  


 


INR  0.93  


 


PTT (Actin FS)  33.9  


 


VBG pH   


 


POC VBG pCO2   


 


POC VBG pO2   


 


VBG HCO3   


 


VBG O2 Sat (Yared)   


 


VBG Base Excess   


 


Sodium   


 


Potassium   


 


Chloride   


 


Carbon Dioxide   


 


Anion Gap   


 


BUN   


 


Creatinine   


 


Est GFR (CKD-EPI)AfAm   


 


Est GFR (CKD-EPI)NonAf   


 


Random Glucose   


 


Lactic Acid    2.0


 


Calcium   


 


Magnesium   


 


Total Bilirubin   


 


AST   


 


ALT   


 


Alkaline Phosphatase   


 


Creatine Kinase   


 


Creatine Kinase Index   


 


CK-MB (CK-2)   


 


Troponin I   


 


B-Natriuretic Peptide   


 


Total Protein   


 


Albumin   











ASSESSMENT/PLAN:


Mr. Morrison is a 64 year old male with a significant past medical history of 

congestive heart failure, COPD, and an active smoker who presented with SOB 

ongoing for a few weeks associated with coughing up clear productive sputum. 





#1 COPD Exacerbation


+leukocytosis, lactic acid normal, BNP and troponin normal 


placed on BIPAP, received IV methylprenisone and a dose of IV azithromycin


continue with IV solucortef 100mg q8hr, azithromycin 1gm daily, and albuterol 

treatment q6hr prn


Pulmonary - Dr. Guallpa consulted 





#2 Acute Renal Insufficiency


Baseline creatinine 0.9, now increased to 1.6, likely cardiorenal due to 

increased dose of oral lasix


Hold oral lasix and lisinopril


Nephrology consulted- Dr. Henriquez


 


#3 Congestive Heart Failure


Patient has a normal BNP, on exam he appears to have signs of fluid overload 


Lasix on hold in setting for worsening creatinine


If creatinine improves in am he will benefit with a dose of IV lasix 40 mg once 





#4 Hyperlipidemia


LFT's normal, continue with statin therapy











Visit type





- Emergency Visit


Emergency Visit: Yes


ED Registration Date: 12/26/19


Care time: The patient presented to the Emergency Department on the above date 

and was hospitalized for further evaluation of their emergent condition.





- New Patient


This patient is new to me today: Yes


Date on this admission: 12/27/19





- Critical Care


Critical Care patient: No

## 2019-12-27 NOTE — PN
Progress Note, Physician


Chief Complaint: 





AWAKE ALERT


EVENTS AND NOTES REVIEWED


SOB CONTINUES ON 02 NC





- Current Medication List


Current Medications: 


Active Medications





Albuterol Sulfate (Ventolin 0.083% Nebulizer Soln -)  1 amp NEB Q4H PRN


   PRN Reason: SHORT OF BREATH/WHEEZING


Albuterol/Ipratropium (Duoneb -)  1 amp NEB RQID KAP[facility]


Atorvastatin Calcium (Lipitor -)  80 mg PO HS [facility]


Famotidine (Pepcid -)  40 mg PO DAILY [facility]


Azithromycin 250 mg/ Dextrose  250 mls @ 250 mls/hr IVPB DAILY [facility]


   Last Admin: 12/27/19 10:15 Dose:  250 mls/hr


Methylprednisolone Sodium Succinate (Solu-Medrol -)  40 mg IVPUSH Q6H-IV [facility]











- Objective


Vital Signs: 


 Vital Signs











Temperature  97.6 F   12/27/19 12:21


 


Pulse Rate  88   12/27/19 12:21


 


Respiratory Rate  20   12/27/19 12:21


 


Blood Pressure  141/83   12/27/19 12:21


 


O2 Sat by Pulse Oximetry (%)  99   12/27/19 09:00











Constitutional: Yes: Mild Distress


Cardiovascular: Yes: Regular Rate and Rhythm


Respiratory: Yes: Diminished, On Nasal O2


Gastrointestinal: Yes: Soft


Genitourinary: Yes: WNL


Musculoskeletal: Yes: WNL


Edema: Yes


Integumentary: Yes: WNL


Wound/Incision: Yes: Clean/Dry


Neurological: Yes: Pre-Existing Deficit


Labs: 


 CBC, BMP





 12/27/19 06:20 





 12/27/19 06:20 





 INR, PTT











INR  0.93  (0.83-1.09)   12/26/19  20:15    














Problem List





- Problems


(1) COPD exacerbation


Code(s): J44.1 - CHRONIC OBSTRUCTIVE PULMONARY DISEASE W (ACUTE) EXACERBATION   





(2) Cough


Code(s): R05 - COUGH   





(3) Respiratory distress


Code(s): R06.03 - ACUTE RESPIRATORY DISTRESS   





(4) SOB (shortness of breath)


Code(s): R06.02 - SHORTNESS OF BREATH   





(5) Acute on chronic respiratory failure with hypoxia and hypercapnia


Code(s): J96.21 - ACUTE AND CHRONIC RESPIRATORY FAILURE WITH HYPOXIA; J96.22 - 

ACUTE AND CHRONIC RESPIRATORY FAILURE WITH HYPERCAPNIA   





(6) HTN (hypertension)


Code(s): I10 - ESSENTIAL (PRIMARY) HYPERTENSION   





(7) Tobacco abuse counseling


Code(s): Z71.6 - TOBACCO ABUSE COUNSELING   





Assessment/Plan





IV STEROIDS 02 SUPPORT


GI PROPHYLAXIS


PULMONARY EVAL


RENAL F/U


OOB TO CHAIR


DVT PROPHYLAXIS

## 2019-12-27 NOTE — EKG
Test Reason : 

Blood Pressure : ***/*** mmHG

Vent. Rate : 107 BPM     Atrial Rate : 107 BPM

   P-R Int : 142 ms          QRS Dur : 076 ms

    QT Int : 322 ms       P-R-T Axes : 072 078 072 degrees

   QTc Int : 429 ms

 

*** POOR DATA QUALITY, INTERPRETATION MAY BE ADVERSELY AFFECTED

SINUS TACHYCARDIA

BIATRIAL ENLARGEMENT

ABNORMAL ECG

 

Confirmed by CHETNA VILLAGRAN MD ([id number]) on 12/27/2019 2:04:28 PM

 

Referred By:             Confirmed By:CHETNA VILLAGRAN MD

## 2019-12-27 NOTE — CONS
PULMONARY CONSULTATION 

 

DATE OF CONSULTATION:  12/27/2019

 

REFERRING PHYSICIAN:  Porfirio Blanco MD 

 

HISTORY:  Patient is a 64-year-old black male known to me from previous

hospitalizations with office follow up, which I have not seen the patient in my

office in almost a year.  With a past medical history of congestive heart 
failure

diastolic versus systolic, COPD, longstanding history of tobacco use currently 
still

smoking, likely obstructive sleep apnea.  Admitted to [facility]

with the complaint of increasing shortness of breath, dyspnea on exertion, 
cough for

the past 2-3 weeks.  Patient denied any chest pain, nausea, vomiting, 
diaphoresis. 

Patient described increasing dosing of Lasix, but I have not spoken to him in 
greater

than 1 year.  Patient apparently has been on steroids since his previous

hospitalization of March 2019.  He also complains of chest pain.  Denies any 
nausea,

vomiting, diaphoresis.  On admission, he was noted to be in moderate respiratory

distress.  He had a venous blood gas performed, which revealed evidence of acute

hypercapnic, hypoxemic respiratory failure.  No formal ABG was performed.  
Venous

blood gas was unknown quantity of oxygen.  He was admitted.  He was started on

inhaled bronchodilators as well as antibiotics.  Patient denies any hemoptysis.
  He

denies any history of occupational exposure to chemicals or fumes.  

 

PAST MEDICAL HISTORY:  Again, includes advanced COPD, likely obstructive sleep 
apnea,

congestive heart failure, tobacco abuse, morbid obesity.

 

CURRENT MEDICATIONS:  Include Solu-Cortef, albuterol, Pepcid, Lipitor.

 

REVIEW OF SYSTEMS:  Positive dyspnea, positive cough, positive chest pain.  No 
fever,

no chills, no hemoptysis, no abdominal pain, no lower extremity edema.

 

PHYSICAL EXAMINATION: 

General:  Patient is an obese male awake, mildly dyspneic in no acute distress.

Vital Signs:  He is afebrile.  Blood pressure 138/81, respiratory rate 20, O2

saturation 97% on 40% O2.

HEENT:  Normocephalic, atraumatic.

Neck:  Supple.

Heart:  Regular S1, S2.

Chest:  Bilateral wheezes.

Abdomen:  Soft.  Bowel sounds are positive.

Extremities:  Positive lower extremity edema.

 

LABORATORIES:  Venous blood gas 7.30, PCO2 of 67, PO2 of 109, bicarbonate 31,

saturation 97.  WBC 12, hemoglobin 11.2, hematocrit 36.1 with a platelet count 
of

271,000.  Chemistries BUN 14, creatinine 0.9.

 

Chest x-ray:  No acute infiltrates or effusions.  Patient's previous 
echocardiogram

March 2019 revealed left ventricular systolic function within normal limits 
with an

ejection fraction of 50%-55%.

 

IMPRESSION:  Acute on chronic hypoxic, hypercapnic respiratory failure 
secondary to:

1.  Advanced chronic obstructive pulmonary disease with acute exacerbation.

2.  History of hypertension.

3.  Questionable history of congestive heart failure.

4.  Tobacco abuse.

5.  Likely obstructive sleep apnea.

 

PLAN:  IV steroids.  Inhaled bronchodilators.  Supplemental O2.  NIPPV as 
needed. 

Lasix.  Cardiology evaluation.  Formal sleep study as an outpatient as well as 
chest

CT low dose as an outpatient for Lung cancer screening.

 

 

CORNELIO SMITH M.D.

 

[id number]

DD: 12/27/2019 12:22

DT: 12/27/2019 13:10

Job #:  [id number]

MTDD

## 2019-12-27 NOTE — PN
Progress Note (short form)





- Note


Progress Note: 





IMP ACUTE ON CHRONIC HYPOXEMIC/HYPERCAPNEIC RESPIRATORY FAILURE


      ADVANCED COPD WITH ACUTE EXACERBATION


      HTN


      ? H/O CHF


      TOBACCO ABUSE


      LIKELY WILSON


      





PLAN IV STEROIDS


        INHALED BRONCHODILATORS


        SUPPLEMENTAL O2


        NIPPV AS NEEDED


        ABX


        CARDIOLOGY EVALUATION


        FORMAL SLEEP STUDIES OUTPATIENT


        CHEST CT LOW DOSE OUTPATIENT FOR LUNG CANCER SCREENING


        ABG











DR SMITH


   


      


       


  





Problem List





- Problems


(1) Acute on chronic respiratory failure with hypoxia and hypercapnia


Code(s): J96.21 - ACUTE AND CHRONIC RESPIRATORY FAILURE WITH HYPOXIA; J96.22 - 

ACUTE AND CHRONIC RESPIRATORY FAILURE WITH HYPERCAPNIA   





(2) SOB (shortness of breath)


Code(s): R06.02 - SHORTNESS OF BREATH   





(3) COPD exacerbation


Code(s): J44.1 - CHRONIC OBSTRUCTIVE PULMONARY DISEASE W (ACUTE) EXACERBATION   





(4) HTN (hypertension)


Code(s): I10 - ESSENTIAL (PRIMARY) HYPERTENSION   





(5) Tobacco abuse


Code(s): Z72.0 - TOBACCO USE   





(6) Tobacco abuse counseling


Code(s): Z71.6 - TOBACCO ABUSE COUNSELING

## 2019-12-27 NOTE — CONSULT
Consult - text type





- Consultation


Consultation Note: 





Renal consult for BRENT





This is a 64 year old gentleman with history of CHF, COPD who presented with 

shortness of breath and found to have elevated Cr. Pt seen and examined at the 

bedside. He offers no acute complaints. Denies any history of CKD. Making urine 

well. + NSAID use at home prior to admission. SOB has improved. Denies any leg 

swelling. No flank pain, chest pain, abd pain, N/V/D. No skin rash. 





PMhx: as above


Allergies: NKDA


Family Hx: NC


Social Hx: No T/A/D


ROS: as per HPI, all other pertinent ros negative


 Home Medications











 Medication  Instructions  Recorded


 


Atorvastatin Ca [Lipitor] 80 mg PO HS 03/07/19


 


Furosemide [Lasix] 40 mg PO DAILY 03/07/19


 


Ranitidine [Zantac -] 300 mg PO DAILY 03/07/19


 


Albuterol 2.5/Ipratropium 0.5 1 amp NEB PRN PRN 03/08/19





[Duoneb -]  


 


Lisinopril [Prinivil] 20 mg PO DAILY #30 tablet 03/16/19


 


Albuterol Sulfate Inhaler - 1 inh PO Q4H PRN 12/26/19





[Ventolin Hfa Inhaler -]  


 


Prednisone 20 mg PO DAILY 12/26/19








 Vital Signs











Temperature  98.2 F   12/27/19 14:00


 


Pulse Rate  85   12/27/19 14:00


 


Respiratory Rate  20   12/27/19 14:00


 


Blood Pressure  150/90   12/27/19 14:00


 


O2 Sat by Pulse Oximetry (%)  99   12/27/19 09:00








 Intake & Output











 12/24/19 12/25/19 12/26/19 12/27/19





 23:59 23:59 23:59 23:59


 


Intake Total    470


 


Balance    470


 


Weight   111.13 kg 115.212 kg





NAD


awake and alert


neck supple


RRR


CTA


soft NT/ND


no LE edema, clubbing or cyanosis


 CBC, BMP





 12/27/19 06:20 





 12/27/19 13:10 





 Current Medications





Albuterol Sulfate (Ventolin 0.083% Nebulizer Soln -)  1 amp NEB Q4H PRN


   PRN Reason: SHORT OF BREATH/WHEEZING


Albuterol/Ipratropium (Duoneb -)  1 amp NEB RQID KAP[facility]


Atorvastatin Calcium (Lipitor -)  80 mg PO HS KAPIL


Famotidine (Pepcid -)  40 mg PO DAILY K[facility]


Heparin Sodium (Porcine) (Heparin -)  5,000 unit SQ BID KAPIL


Azithromycin 250 mg/ Dextrose  250 mls @ 250 mls/hr IVPB DAILY KAP[facility]


   Last Admin: 12/27/19 10:15 Dose:  250 mls/hr


Methylprednisolone Sodium Succinate (Solu-Medrol -)  40 mg IVPUSH Q6H-IV KAP[facility]


   Last Admin: 12/27/19 14:09 Dose:  40 mg





64 year old gentleman with history of CHF, COPD who presented with shortness of 

breath and found to have elevated Cr.





1. Acute renal insufficiency likely secondary to transient hemodynamic changes 

in setting of NSAID use


2. CHF


3. COPD


4. Anemia 


5. Hyperlipidemia 





Renal function is now improved and stable


no signs overt volume overlaod


no overt electrolyte or acid base disturbance


continue steroids per primary team (may cause BUN to rise)





will follow up as needed


please call if there are any questions or concerns





Carlos Henriquez DO

## 2019-12-28 LAB
ALBUMIN SERPL-MCNC: 3.6 G/DL (ref 3.4–5)
ALP SERPL-CCNC: 70 U/L (ref 45–117)
ALT SERPL-CCNC: 24 U/L (ref 13–61)
ANION GAP SERPL CALC-SCNC: 3 MMOL/L (ref 8–16)
AST SERPL-CCNC: 14 U/L (ref 15–37)
BILIRUB SERPL-MCNC: 0.1 MG/DL (ref 0.2–1)
BUN SERPL-MCNC: 21.3 MG/DL (ref 7–18)
CALCIUM SERPL-MCNC: 9.4 MG/DL (ref 8.5–10.1)
CHLORIDE SERPL-SCNC: 99 MMOL/L (ref 98–107)
CO2 SERPL-SCNC: 37 MMOL/L (ref 21–32)
CREAT SERPL-MCNC: 0.9 MG/DL (ref 0.55–1.3)
DEPRECATED RDW RBC AUTO: 15.7 % (ref 11.9–15.9)
GLUCOSE SERPL-MCNC: 126 MG/DL (ref 74–106)
HCT VFR BLD CALC: 35.9 % (ref 35.4–49)
HGB BLD-MCNC: 11.1 GM/DL (ref 11.7–16.9)
MCH RBC QN AUTO: 27.9 PG (ref 25.7–33.7)
MCHC RBC AUTO-ENTMCNC: 30.9 G/DL (ref 32–35.9)
MCV RBC: 90.2 FL (ref 80–96)
PLATELET # BLD AUTO: 287 K/MM3 (ref 134–434)
PMV BLD: 8.3 FL (ref 7.5–11.1)
POTASSIUM SERPLBLD-SCNC: 5 MMOL/L (ref 3.5–5.1)
PROT SERPL-MCNC: 7.3 G/DL (ref 6.4–8.2)
RBC # BLD AUTO: 3.98 M/MM3 (ref 4–5.6)
SODIUM SERPL-SCNC: 138 MMOL/L (ref 136–145)
WBC # BLD AUTO: 16.3 K/MM3 (ref 4–10)

## 2019-12-28 RX ADMIN — METHYLPREDNISOLONE SODIUM SUCCINATE SCH MG: 40 INJECTION, POWDER, FOR SOLUTION INTRAMUSCULAR; INTRAVENOUS at 03:28

## 2019-12-28 RX ADMIN — FAMOTIDINE SCH MG: 20 TABLET ORAL at 09:04

## 2019-12-28 RX ADMIN — METHYLPREDNISOLONE SODIUM SUCCINATE SCH MG: 40 INJECTION, POWDER, FOR SOLUTION INTRAMUSCULAR; INTRAVENOUS at 14:24

## 2019-12-28 RX ADMIN — AZITHROMYCIN DIHYDRATE SCH MLS/HR: 500 INJECTION, POWDER, LYOPHILIZED, FOR SOLUTION INTRAVENOUS at 09:13

## 2019-12-28 RX ADMIN — IPRATROPIUM BROMIDE AND ALBUTEROL SULFATE SCH AMP: .5; 3 SOLUTION RESPIRATORY (INHALATION) at 08:19

## 2019-12-28 RX ADMIN — HEPARIN SODIUM SCH UNIT: 5000 INJECTION, SOLUTION INTRAVENOUS; SUBCUTANEOUS at 22:01

## 2019-12-28 RX ADMIN — IPRATROPIUM BROMIDE AND ALBUTEROL SULFATE SCH AMP: .5; 3 SOLUTION RESPIRATORY (INHALATION) at 16:15

## 2019-12-28 RX ADMIN — ATORVASTATIN CALCIUM SCH MG: 80 TABLET, FILM COATED ORAL at 22:01

## 2019-12-28 RX ADMIN — METHYLPREDNISOLONE SODIUM SUCCINATE SCH MG: 40 INJECTION, POWDER, FOR SOLUTION INTRAMUSCULAR; INTRAVENOUS at 09:03

## 2019-12-28 RX ADMIN — IPRATROPIUM BROMIDE AND ALBUTEROL SULFATE SCH AMP: .5; 3 SOLUTION RESPIRATORY (INHALATION) at 20:20

## 2019-12-28 RX ADMIN — HEPARIN SODIUM SCH UNIT: 5000 INJECTION, SOLUTION INTRAVENOUS; SUBCUTANEOUS at 09:04

## 2019-12-28 RX ADMIN — IPRATROPIUM BROMIDE AND ALBUTEROL SULFATE SCH AMP: .5; 3 SOLUTION RESPIRATORY (INHALATION) at 11:43

## 2019-12-28 RX ADMIN — METHYLPREDNISOLONE SODIUM SUCCINATE SCH MG: 40 INJECTION, POWDER, FOR SOLUTION INTRAMUSCULAR; INTRAVENOUS at 22:06

## 2019-12-28 NOTE — PN
Progress Note, Physician


History of Present Illness: 


PULMONARY





ALERT,FEELING BETTER,LESS DYSPNEIC





- Current Medication List


Current Medications: 


Active Medications





Albuterol Sulfate (Ventolin 0.083% Nebulizer Soln -)  1 amp NEB Q4H PRN


   PRN Reason: SHORT OF BREATH/WHEEZING


Albuterol/Ipratropium (Duoneb -)  1 amp NEB RQID [facility]


   Last Admin: 12/28/19 08:19 Dose:  1 amp


Atorvastatin Calcium (Lipitor -)  80 mg PO HS [facility]


   Last Admin: 12/27/19 21:57 Dose:  80 mg


Famotidine (Pepcid -)  40 mg PO DAILY [facility]


   Last Admin: 12/28/19 09:04 Dose:  40 mg


Heparin Sodium (Porcine) (Heparin -)  5,000 unit SQ BID [facility]


   Last Admin: 12/28/19 09:04 Dose:  5,000 unit


Azithromycin 250 mg/ Dextrose  250 mls @ 250 mls/hr IVPB DAILY [facility]


   Last Admin: 12/28/19 09:13 Dose:  250 mls/hr


Methylprednisolone Sodium Succinate (Solu-Medrol -)  40 mg IVPUSH Q6H-IV [facility]


   Last Admin: 12/28/19 09:03 Dose:  40 mg











- Objective


Vital Signs: 


 Vital Signs











Temperature  98.2 F   12/28/19 10:00


 


Pulse Rate  82   12/28/19 10:00


 


Respiratory Rate  24 H  12/28/19 10:00


 


Blood Pressure  138/72   12/28/19 10:00


 


O2 Sat by Pulse Oximetry (%)  97   12/28/19 09:00











Constitutional: Yes: Calm, Obese


Eyes: Yes: WNL


HENT: Yes: WNL


Neck: Yes: WNL


Cardiovascular: Yes: Regular Rate and Rhythm, S1, S2


Respiratory: Yes: Wheezes (BILATERAL WHEEZES)


Gastrointestinal: Yes: Normal Bowel Sounds, Soft


Extremities: Yes: WNL


Edema: No


Labs: 


 CBC, BMP





 12/28/19 06:00 





 12/28/19 06:00 





 INR, PTT











INR  0.93  (0.83-1.09)   12/26/19  20:15    








 Laboratory Tests











  12/27/19





  16:15


 


ABG pH  7.37


 


ABG pCO2 at Pt Temp  63.7 H


 


ABG pO2 at Pt Temp  67.7 L


 


ABG HCO3  35.5 H


 


ABG O2 Sat (Measured)  92.2 L


 


O2 Delivery Device  Nasal


 


Oxygen Flow Rate  2














Assessment/Plan











IMP ACUTE ON CHRONIC HYPOXEMIC/HYPERCAPNEIC RESPIRATORY FAILURE


      ADVANCED COPD WITH ACUTE EXACERBATION


      HTN


      ? H/O CHF


      TOBACCO ABUSE


      LIKELY WILSON


      





PLAN IV STEROIDS


        INHALED BRONCHODILATORS


        SUPPLEMENTAL O2


        NIPPV AS NEEDED


        ABX


        FORMAL SLEEP STUDIES OUTPATIENT


        CHEST CT LOW DOSE OUTPATIENT FOR LUNG CANCER SCREENING


       











DR SMITH


   


      


       


  





 Problem List 





- Problems


(1) Acute on chronic respiratory failure with hypoxia and hypercapnia


Code(s): J96.21 - ACUTE AND CHRONIC RESPIRATORY FAILURE WITH HYPOXIA; J96.22 - 

ACUTE AND CHRONIC RESPIRATORY FAILURE WITH HYPERCAPNIA   





(2) SOB (shortness of breath)


Code(s): R06.02 - SHORTNESS OF BREATH   





(3) COPD exacerbation


Code(s): J44.1 - CHRONIC OBSTRUCTIVE PULMONARY DISEASE W (ACUTE) EXACERBATION   





(4) HTN (hypertension)


Code(s): I10 - ESSENTIAL (PRIMARY) HYPERTENSION   





(5) Tobacco abuse


Code(s): Z72.0 - TOBACCO USE   





(6) Tobacco abuse counseling


Code(s): Z71.6 - TOBACCO ABUSE COUNSELING

## 2019-12-28 NOTE — CON.CARD
Consult


Consult Specialty:: Cardiology


Referred by:: Dr. Blanco


Reason for Consultation:: Shortness of breath





- History of Present Illness


Chief Complaint: Shortness of breath


History of Present Illness: 


64 year old man, smoker with a PMHx of COPD and diastolic CHF admitted 12/26/19 

with SOB ongoing for a few weeks. Patient reported coughing up clear productive 

sputum. Pt stated he saw Dr. Villegas earlier and he increased his lasix dosage 

due to his edema with no relief of symptoms prompting him to the ER. Patient  

was admitted with similar symptoms on 3/20/19. He denies any recent fevers, 

chills, chest pain, headache or dizziness. 





He was treated for COPD exacerbation- placed on BIPAP, received IV steroid and 

antibiotics. BRENT resolved. 





CXR 12/26/19 cardiomegaly. ECG showed sinus tachycardia at 107 bpm. Biatrial 

enlargement. BNP is normal. 





Echocardiogram 3/9/2019 TDS: Gross normal LV size and systolic function. LVEF 55

-55%. Normal RV. Normal LA and RA. Mild MR. Mild TR. 











- History Source


History Provided By: Patient, Medical Record


Limitations to Obtaining History: No Limitations





- Alcohol/Substance Use


Hx Alcohol Use: No





- Smoking History


Smoking history: Current every day smoker


Have you smoked in the past 12 months: Yes


Aproximately how many cigarettes per day: 20





- Social History


Usual Living Arrangement: Other (with girlfriend in apartment with 2 steps to 

enter)


ADL: Independent





Home Medications





- Allergies


Allergies/Adverse Reactions: 


 Allergies











Allergy/AdvReac Type Severity Reaction Status Date / Time


 


No Known Allergies Allergy   Verified 12/26/19 20:14














- Home Medications


Home Medications: 


Ambulatory Orders





Atorvastatin Ca [Lipitor] 80 mg PO HS 03/07/19 


Furosemide [Lasix] 40 mg PO DAILY 03/07/19 


Ranitidine [Zantac -] 300 mg PO DAILY 03/07/19 


Albuterol 2.5/Ipratropium 0.5 [Duoneb -] 1 amp NEB PRN PRN 03/08/19 


Lisinopril [Prinivil] 20 mg PO DAILY #30 tablet 03/16/19 


Albuterol Sulfate Inhaler - [Ventolin Hfa Inhaler -] 1 inh PO Q4H PRN 12/26/19 


Prednisone 20 mg PO DAILY 12/26/19 











Review of Systems





- Review of Systems


Constitutional: reports: No Symptoms


Eyes: reports: No Symptoms


HENT: reports: No Symptoms


Neck: reports: No Symptoms


Cardiovascular: reports: Edema, Shortness of Breath


Respiratory: reports: Exercise Intolerance, Snoring, SOB, SOB on Exertion, 

Wheezing


Gastrointestinal: reports: No Symptoms


Genitourinary: reports: No Symptoms


Breasts: reports: No Symptoms Reported


Musculoskeletal: reports: No Symptoms


Integumentary: reports: No Symptoms


Neurological: reports: No Symptoms


Endocrine: reports: No Symptoms


Hematology/Lymphatic: reports: No Symptoms


Psychiatric: reports: No Symptoms


Vital Signs: 


 Vital Signs











Temperature  98.2 F   12/28/19 10:00


 


Pulse Rate  82   12/28/19 10:00


 


Respiratory Rate  24 H  12/28/19 10:00


 


Blood Pressure  138/72   12/28/19 10:00


 


O2 Sat by Pulse Oximetry (%)  97   12/28/19 09:00








General:  Well developed. Obese. No acute distress. 


Head: Normocephalic. Atraumatic, 


Eyes: PERRLA, EOMI. Sclerae anicteric. Conjunctivae clear.


Neck: Supple. No JVD. No bruits. 


Heart: Normal S1, S2: Regular rhythm and rate. No murmur. No gallop or rub. 


Lungs: Symmetrical poor air entry with prolonged expiration. No crackle. 

Scatterd wheezing or rhonchi.  


Abdomen: Soft. Bowel sound positive. Non tender. No masses. 


Extremities: Trace  edema. No clubbing or cyanosis. PD 2+, equal bilaterally.


Neuro: Intact, no focal findings. AAO X3





- Other Data


Labs, Other Data: 


 CBC, BMP





 12/28/19 06:00 





 12/28/19 06:00 





 INR, PTT











INR  0.93  (0.83-1.09)   12/26/19  20:15    














Assessment/Plan


64 year old man, smoker with a PMHx of COPD and diastolic CHF admitted 12/26/19 

with SOB ongoing for a few weeks. Patient reported coughing up clear productive 

sputum. Pt stated he saw Dr. Villegas earlier and he increased his lasix dosage 

due to his edema with no relief of symptoms prompting him to the ER. Patient  

was admitted with similar symptoms on 3/20/19. He denies any recent fevers, 

chills, chest pain, headache or dizziness. 





He was treated for COPD exacerbation- placed on BIPAP, received IV steroid and 

antibiotics. BRENT resolved. 





CXR 12/26/19 cardiomegaly. ECG showed sinus tachycardia at 107 bpm. Biatrial 

enlargement. BNP is normal. 


Echocardiogram 3/9/2019 TDS: Gross normal LV size and systolic function. LVEF 55

-55%. Normal RV. Normal LA and RA. Mild MR. Mild TR. 





Worsening Dyspnea predominantly due to COPD exacerbation. Diastolic CHF might 

play a small role. He has no physical signs of fluid overload. BNP is within 

normal range. 


-Continue oral Lasix.


-Pulmonary care for COPD exacerbation.





Please do not hesitate to call us for re-consult at any time if any further 

questions or additional issue arises regarding this patient.

## 2019-12-28 NOTE — PN
Progress Note, Physician


Chief Complaint: 





AWAKE ALERT


EATING A HIGH SODIUM DIET WITH JUNK FOOD IN THE ROOM


I EXPLAINED THE IMPORTANCE OF BEING COMPLIANT WITH MEDS


AND LIFESTYLE.


STILL SOB WITH 02NC AND DIFFICULTY AMBULATING





- Current Medication List


Current Medications: 


Active Medications





Albuterol Sulfate (Ventolin 0.083% Nebulizer Soln -)  1 amp NEB Q4H PRN


   PRN Reason: SHORT OF BREATH/WHEEZING


Albuterol/Ipratropium (Duoneb -)  1 amp NEB RQID [facility]


   Last Admin: 12/28/19 08:19 Dose:  1 amp


Atorvastatin Calcium (Lipitor -)  80 mg PO HS [facility]


   Last Admin: 12/27/19 21:57 Dose:  80 mg


Famotidine (Pepcid -)  40 mg PO DAILY [facility]


   Last Admin: 12/28/19 09:04 Dose:  40 mg


Heparin Sodium (Porcine) (Heparin -)  5,000 unit SQ BID [facility]


   Last Admin: 12/28/19 09:04 Dose:  5,000 unit


Azithromycin 250 mg/ Dextrose  250 mls @ 250 mls/hr IVPB DAILY [facility]


   Last Admin: 12/28/19 09:13 Dose:  250 mls/hr


Methylprednisolone Sodium Succinate (Solu-Medrol -)  40 mg IVPUSH Q6H-IV [facility]


   Last Admin: 12/28/19 09:03 Dose:  40 mg











- Objective


Vital Signs: 


 Vital Signs











Temperature  98.2 F   12/28/19 10:00


 


Pulse Rate  82   12/28/19 10:00


 


Respiratory Rate  24 H  12/28/19 10:00


 


Blood Pressure  138/72   12/28/19 10:00


 


O2 Sat by Pulse Oximetry (%)  97   12/28/19 09:00











Constitutional: Yes: Moderate Distress


Cardiovascular: Yes: Regular Rate and Rhythm


Respiratory: Yes: Diminished, On Nasal O2, Rhonchi, Wheezes


Gastrointestinal: Yes: Abdomen, Obese


Genitourinary: Yes: WNL


Musculoskeletal: Yes: Muscle Weakness


Edema: Yes


Labs: 


 CBC, BMP





 12/28/19 06:00 





 12/28/19 06:00 





 INR, PTT











INR  0.93  (0.83-1.09)   12/26/19  20:15    














Problem List





- Problems


(1) COPD exacerbation


Code(s): J44.1 - CHRONIC OBSTRUCTIVE PULMONARY DISEASE W (ACUTE) EXACERBATION   





(2) Cough


Code(s): R05 - COUGH   





(3) Respiratory distress


Code(s): R06.03 - ACUTE RESPIRATORY DISTRESS   





(4) SOB (shortness of breath)


Code(s): R06.02 - SHORTNESS OF BREATH   





(5) Acute on chronic respiratory failure with hypoxia and hypercapnia


Code(s): J96.21 - ACUTE AND CHRONIC RESPIRATORY FAILURE WITH HYPOXIA; J96.22 - 

ACUTE AND CHRONIC RESPIRATORY FAILURE WITH HYPERCAPNIA   





(6) HTN (hypertension)


Code(s): I10 - ESSENTIAL (PRIMARY) HYPERTENSION   





(7) Tobacco abuse counseling


Code(s): Z71.6 - TOBACCO ABUSE COUNSELING   





Assessment/Plan





IV STEROIDS 02 SUPPORT


GI PROPHYLAXIS


PULMONARY EVAL APPRECIATED


RENAL F/U


OOB TO CHAIR


DVT PROPHYLAXIS


CHECK ECHO HEART FUNCTION AND EF%


DIETARY CONSULT

## 2019-12-29 RX ADMIN — METHYLPREDNISOLONE SODIUM SUCCINATE SCH MG: 40 INJECTION, POWDER, FOR SOLUTION INTRAMUSCULAR; INTRAVENOUS at 17:24

## 2019-12-29 RX ADMIN — IPRATROPIUM BROMIDE AND ALBUTEROL SULFATE SCH AMP: .5; 3 SOLUTION RESPIRATORY (INHALATION) at 20:30

## 2019-12-29 RX ADMIN — ATORVASTATIN CALCIUM SCH MG: 80 TABLET, FILM COATED ORAL at 21:43

## 2019-12-29 RX ADMIN — IPRATROPIUM BROMIDE AND ALBUTEROL SULFATE SCH AMP: .5; 3 SOLUTION RESPIRATORY (INHALATION) at 08:12

## 2019-12-29 RX ADMIN — METHYLPREDNISOLONE SODIUM SUCCINATE SCH MG: 40 INJECTION, POWDER, FOR SOLUTION INTRAMUSCULAR; INTRAVENOUS at 09:17

## 2019-12-29 RX ADMIN — AZITHROMYCIN DIHYDRATE SCH MLS/HR: 500 INJECTION, POWDER, LYOPHILIZED, FOR SOLUTION INTRAVENOUS at 09:17

## 2019-12-29 RX ADMIN — FAMOTIDINE SCH MG: 20 TABLET ORAL at 09:17

## 2019-12-29 RX ADMIN — METHYLPREDNISOLONE SODIUM SUCCINATE SCH MG: 40 INJECTION, POWDER, FOR SOLUTION INTRAMUSCULAR; INTRAVENOUS at 03:38

## 2019-12-29 RX ADMIN — IPRATROPIUM BROMIDE AND ALBUTEROL SULFATE SCH AMP: .5; 3 SOLUTION RESPIRATORY (INHALATION) at 12:14

## 2019-12-29 RX ADMIN — IPRATROPIUM BROMIDE AND ALBUTEROL SULFATE SCH AMP: .5; 3 SOLUTION RESPIRATORY (INHALATION) at 16:26

## 2019-12-29 RX ADMIN — HEPARIN SODIUM SCH UNIT: 5000 INJECTION, SOLUTION INTRAVENOUS; SUBCUTANEOUS at 09:17

## 2019-12-29 RX ADMIN — HEPARIN SODIUM SCH UNIT: 5000 INJECTION, SOLUTION INTRAVENOUS; SUBCUTANEOUS at 21:43

## 2019-12-29 NOTE — PN
Progress Note, Physician


History of Present Illness: 





PULMONARY





ALERT,FEELING BETTER,LESS CONGESTED,SOB IMPROVING





- Current Medication List


Current Medications: 


Active Medications





Albuterol Sulfate (Ventolin 0.083% Nebulizer Soln -)  1 amp NEB Q4H PRN


   PRN Reason: SHORT OF BREATH/WHEEZING


Albuterol/Ipratropium (Duoneb -)  [facility] amp [facility]B [facility]


   Last Admin: 12/29/19 08:12 Dose:  1 amp


Atorvastatin Calcium (Lipitor -)  80 mg PO HS [facility]


   Last Admin: 12/28/19 22:01 Dose:  80 mg


Famotidine (Pepcid -)  40 mg PO DAILY [facility]


   Last Admin: 12/29/19 09:17 Dose:  40 mg


Heparin Sodium (Porcine) (Heparin -)  5,000 unit SQ BID [facility]


   Last Admin: 12/29/19 09:17 Dose:  5,000 unit


Azithromycin 250 mg/ Dextrose  250 mls @ 250 mls/hr IVPB DAILY [facility]


   Last Admin: 12/29/19 09:17 Dose:  250 mls/hr


Methylprednisolone Sodium Succinate (Solu-Medrol -)  40 mg IVPUSH Q6H-IV [facility]


   Last Admin: 12/29/19 09:17 Dose:  40 mg











- Objective


Vital Signs: 


 Vital Signs











Temperature  98.1 F   12/29/19 09:16


 


Pulse Rate  85   12/29/19 09:16


 


Respiratory Rate  24 H  12/29/19 09:16


 


Blood Pressure  134/80   12/29/19 09:16


 


O2 Sat by Pulse Oximetry (%)  98   12/29/19 09:00











Constitutional: Yes: Calm, Obese


Eyes: Yes: WNL


HENT: Yes: WNL


Neck: Yes: WNL


Cardiovascular: Yes: Regular Rate and Rhythm, S1, S2


Respiratory: Yes: Wheezes (SCATTERED MARIELA WHEEZES)


Gastrointestinal: Yes: Normal Bowel Sounds, Soft


Extremities: Yes: WNL


Edema: Yes


Labs: 


 CBC, BMP





 12/28/19 06:00 





 12/28/19 06:00 





 INR, PTT











INR  0.93  (0.83-1.09)   12/26/19  20:15    














Assessment/Plan











IMP ACUTE ON CHRONIC HYPOXEMIC/HYPERCAPNEIC RESPIRATORY FAILURE IMPROVING


      ADVANCED COPD WITH ACUTE EXACERBATION IMPROVING


      HTN


      ? H/O CHF


      TOBACCO ABUSE


      LIKELY WILSON


      





PLAN  STEROID TAPER


        INHALED BRONCHODILATORS


        SUPPLEMENTAL O2


        NIPPV AS NEEDED


        ABX


        FORMAL SLEEP STUDIES OUTPATIENT


        CHEST CT


       





DR SMITH


   


      


       


  





 Problem List 





- Problems


(1) Acute on chronic respiratory failure with hypoxia and hypercapnia


Code(s): J96.21 - ACUTE AND CHRONIC RESPIRATORY FAILURE WITH HYPOXIA; J96.22 - 

ACUTE AND CHRONIC RESPIRATORY FAILURE WITH HYPERCAPNIA   





(2) SOB (shortness of breath)


Code(s): R06.02 - SHORTNESS OF BREATH   





(3) COPD exacerbation


Code(s): J44.1 - CHRONIC OBSTRUCTIVE PULMONARY DISEASE W (ACUTE) EXACERBATION   





(4) HTN (hypertension)


Code(s): I10 - ESSENTIAL (PRIMARY) HYPERTENSION   





(5) Tobacco abuse


Code(s): Z72.0 - TOBACCO USE   





(6) Tobacco abuse counseling


Code(s): Z71.6 - TOBACCO ABUSE COUNSELING

## 2019-12-29 NOTE — PN
Progress Note, Physician


Chief Complaint: 





AWAKE ALERT


FEELING BETTER





- Current Medication List


Current Medications: 


Active Medications





Albuterol Sulfate (Ventolin 0.083% Nebulizer Soln -)  1 amp NEB Q4H PRN


   PRN Reason: SHORT OF BREATH/WHEEZING


Albuterol/Ipratropium (Duoneb -)  1 amp NEB RQID [facility]


   Last Admin: 12/29/19 08:12 Dose:  1 amp


Atorvastatin Calcium (Lipitor -)  80 mg PO HS [facility]


   Last Admin: 12/28/19 22:01 Dose:  80 mg


Famotidine (Pepcid -)  40 mg PO DAILY [facility]


   Last Admin: 12/29/19 09:17 Dose:  40 mg


Heparin Sodium (Porcine) (Heparin -)  5,000 unit SQ BID [facility]


   Last Admin: 12/29/19 09:17 Dose:  5,000 unit


Azithromycin 250 mg/ Dextrose  250 mls @ 250 mls/hr IVPB DAILY [facility]


   Last Admin: 12/29/19 09:17 Dose:  250 mls/hr


Methylprednisolone Sodium Succinate (Solu-Medrol -)  40 mg IVPUSH Q6H-IV [facility]


   Last Admin: 12/29/19 09:17 Dose:  40 mg











- Objective


Vital Signs: 


 Vital Signs











Temperature  98.1 F   12/29/19 09:16


 


Pulse Rate  85   12/29/19 09:16


 


Respiratory Rate  24 H  12/29/19 09:16


 


Blood Pressure  134/80   12/29/19 09:16


 


O2 Sat by Pulse Oximetry (%)  98   12/29/19 09:00











Constitutional: Yes: Mild Distress


Cardiovascular: Yes: Regular Rate and Rhythm


Respiratory: Yes: Diminished, Rhonchi


Gastrointestinal: Yes: Abdomen, Obese


Musculoskeletal: Yes: WNL


Extremities: Yes: WNL


Edema: No


Labs: 


 CBC, BMP





 12/28/19 06:00 





 12/28/19 06:00 





 INR, PTT











INR  0.93  (0.83-1.09)   12/26/19  20:15    














Problem List





- Problems


(1) COPD exacerbation


Code(s): J44.1 - CHRONIC OBSTRUCTIVE PULMONARY DISEASE W (ACUTE) EXACERBATION   





(2) Cough


Code(s): R05 - COUGH   





(3) Respiratory distress


Code(s): R06.03 - ACUTE RESPIRATORY DISTRESS   





(4) SOB (shortness of breath)


Code(s): R06.02 - SHORTNESS OF BREATH   





(5) Acute on chronic respiratory failure with hypoxia and hypercapnia


Code(s): J96.21 - ACUTE AND CHRONIC RESPIRATORY FAILURE WITH HYPOXIA; J96.22 - 

ACUTE AND CHRONIC RESPIRATORY FAILURE WITH HYPERCAPNIA   





(6) HTN (hypertension)


Code(s): I10 - ESSENTIAL (PRIMARY) HYPERTENSION   





(7) Tobacco abuse counseling


Code(s): Z71.6 - TOBACCO ABUSE COUNSELING   





Assessment/Plan





IV STEROIDS 02 SUPPORT


GI PROPHYLAXIS


PULMONARY EVAL APPRECIATED


RENAL F/U


OOB TO CHAIR


DVT PROPHYLAXIS


CHECK ECHO HEART FUNCTION AND EF%


DIETARY CONSULT

## 2019-12-30 RX ADMIN — ATORVASTATIN CALCIUM SCH MG: 80 TABLET, FILM COATED ORAL at 21:16

## 2019-12-30 RX ADMIN — METHYLPREDNISOLONE SODIUM SUCCINATE SCH MG: 40 INJECTION, POWDER, FOR SOLUTION INTRAMUSCULAR; INTRAVENOUS at 21:16

## 2019-12-30 RX ADMIN — IPRATROPIUM BROMIDE AND ALBUTEROL SULFATE SCH AMP: .5; 3 SOLUTION RESPIRATORY (INHALATION) at 20:36

## 2019-12-30 RX ADMIN — HEPARIN SODIUM SCH UNIT: 5000 INJECTION, SOLUTION INTRAVENOUS; SUBCUTANEOUS at 21:16

## 2019-12-30 RX ADMIN — METHYLPREDNISOLONE SODIUM SUCCINATE SCH MG: 40 INJECTION, POWDER, FOR SOLUTION INTRAMUSCULAR; INTRAVENOUS at 10:05

## 2019-12-30 RX ADMIN — HEPARIN SODIUM SCH UNIT: 5000 INJECTION, SOLUTION INTRAVENOUS; SUBCUTANEOUS at 10:05

## 2019-12-30 RX ADMIN — FAMOTIDINE SCH MG: 20 TABLET ORAL at 10:05

## 2019-12-30 RX ADMIN — IPRATROPIUM BROMIDE AND ALBUTEROL SULFATE SCH AMP: .5; 3 SOLUTION RESPIRATORY (INHALATION) at 12:31

## 2019-12-30 RX ADMIN — IPRATROPIUM BROMIDE AND ALBUTEROL SULFATE SCH AMP: .5; 3 SOLUTION RESPIRATORY (INHALATION) at 15:32

## 2019-12-30 RX ADMIN — IPRATROPIUM BROMIDE AND ALBUTEROL SULFATE SCH AMP: .5; 3 SOLUTION RESPIRATORY (INHALATION) at 08:08

## 2019-12-30 RX ADMIN — METHYLPREDNISOLONE SODIUM SUCCINATE SCH MG: 40 INJECTION, POWDER, FOR SOLUTION INTRAMUSCULAR; INTRAVENOUS at 01:00

## 2019-12-30 NOTE — ECHO
______________________________________________________________________________



Name: ANN MARIE GRANADO                                      Exam:Adult Echocardiogram

MRN: [id number]         Study Date: 2019 02:28 PM

Age: 64 yrs

______________________________________________________________________________



Height: 69 in        Weight: 256 lb        BSA: 2.3 m2



______________________________________________________________________________



MMode/2D Measurements & Calculations

IVSd: 1.1 cm                                            Ao root diam: 3.2 cm

LVIDd: 4.4 cm                                           LA dimension: 4.6 cm

LVIDs: 3.3 cm                                           ACS: 1.9 cm

LVPWd: 1.1 cm



_________________________________________________________

EDV(Teich): 88.6 ml                                     LVOT diam: 2.3 cm

ESV(Genny): 44.8 ml



_________________________________________________________

RV S James: 20.4 cm/sec



Doppler Measurements & Calculations

MV E max james: 100.0 cm/sec

MV A max james: 99.4 cm/sec                               MV dec slope: 492.0 cm/sec2

MV E/A: 1.0



_________________________________________________________

Ao V2 max: 150.3 cm/sec                                 LV V1 max P.7 mmHg

Ao max P.0 mmHg                                     LV V1 mean P.8 mmHg

Ao V2 mean: 100.5 cm/sec                                LV V1 max: 119.0 cm/sec

Ao mean P.8 mmHg                                    LV V1 mean: 77.4 cm/sec

Ao V2 VTI: 27.2 cm                                      LV V1 VTI: 23.2 cm

BRANT(I,D): 3.6 cm2



BRANT(V,D): 3.3 cm2

_________________________________________________________



MR max james: 209.0 cm/sec                                SV(LVOT): 97.0 ml

MR max P.5 mmHg

_________________________________________________________



TR max james: 160.2 cm/sec                                PA V2 max: 68.9 cm/sec

TR max PG: 10.3 mmHg                                    PA max P.9 mmHg

_________________________________________________________



Med Peak E' James: 11.5 cm/sec

Med E/e': 8.7

Lat Peak E' James: 13.2 cm/sec

Lat E/e': 7.6



______________________________________________________________________________



Procedure

A complete two-dimensional transthoracic echocardiogram was performed (2D, M-mode, Doppler and color 
flow

Doppler). Technically limited study.

Left Ventricle

The left ventricle is normal in size. Left ventricular systolic function is normal. Ejection Fraction
 = 55-

60%. No regional wall motion abnormalities noted.

Right Ventricle

The right ventricle is not well visualized.

Atria

The left atrium is not well visualized. Right atrium not well visualized.

Mitral Valve

The mitral valve is not well visualized.

Tricuspid Valve

The tricuspid valve is not well visualized.

Aortic Valve

The aortic valve is not well visualized.

Pulmonic Valve

The pulmonic valve is not well visualized.

Great Vessels

The aortic root is normal size.

Pericardium/Pleura

There is no pericardial effusion.

______________________________________________________________________________





Interpretation Summary

Technically limited study

The left ventricle is normal in size.

Left ventricular systolic function is normal.

No regional wall motion abnormalities noted.

Ejection Fraction = 55-60%.

The right ventricle is not well visualized.

Valvular regurgitations not well visualized due to poor acoustic window

There is no pericardial effusion.





Hai Crowley MD 2019 03:43 PM

## 2019-12-30 NOTE — PN
Progress Note, Physician


History of Present Illness: 





pulmonary





alert,feeling better,-resp distress





- Current Medication List


Current Medications: 


Active Medications





Albuterol Sulfate (Ventolin 0.083% Nebulizer Soln -)  1 amp NEB Q4H PRN


   PRN Reason: SHORT OF BREATH/WHEEZING


Albuterol/Ipratropium (Duoneb -)  1 amp NEB RQID [facility]


   Last Admin: 12/30/19 08:08 Dose:  1 amp


Atorvastatin Calcium (Lipitor -)  80 mg PO HS [facility]


   Last Admin: 12/29/19 21:43 Dose:  80 mg


Benzocaine/Menthol (Cepacol Lozenge -)  1 each MM Q4H PRN


   PRN Reason: SORE THROAT


Famotidine (Pepcid -)  40 mg PO DAILY [facility]


   Last Admin: 12/29/19 09:17 Dose:  40 mg


Heparin Sodium (Porcine) (Heparin -)  5,000 unit SQ BID [facility]


   Last Admin: 12/29/19 21:43 Dose:  5,000 unit


Methylprednisolone Sodium Succinate (Solu-Medrol -)  40 mg IVPUSH Q8H-IV [facility]


   Last Admin: 12/30/19 01:00 Dose:  40 mg











- Objective


Vital Signs: 


 Vital Signs











Temperature  97.8 F   12/30/19 06:00


 


Pulse Rate  76   12/30/19 06:00


 


Respiratory Rate  20   12/30/19 08:47


 


Blood Pressure  145/74   12/30/19 06:00


 


O2 Sat by Pulse Oximetry (%)  96   12/29/19 22:00











Constitutional: Yes: Well Nourished, Calm, Obese


Eyes: Yes: WNL


HENT: Yes: WNL


Neck: Yes: WNL


Cardiovascular: Yes: Regular Rate and Rhythm, S1, S2


Respiratory: Yes: Wheezes (less wheezes bilaterally)


Gastrointestinal: Yes: Normal Bowel Sounds, Soft


Extremities: Yes: WNL


Edema: No


Labs: 


 CBC, BMP





 12/28/19 06:00 





 12/28/19 06:00 








Assessment/Plan











IMP ACUTE ON CHRONIC HYPOXEMIC/HYPERCAPNEIC RESPIRATORY FAILURE IMPROVING


      ADVANCED COPD WITH ACUTE EXACERBATION IMPROVING


      HTN


      ? H/O CHF


      TOBACCO ABUSE


      LIKELY WILSON


      RLL NODULE ? INFLAMMATORY


      





PLAN STEROID TAPER


        INHALED BRONCHODILATORS


        SUPPLEMENTAL O2


        NIPPV AS NEEDED


        ABX


        FORMAL SLEEP STUDIES OUTPATIENT


        F/U CHEST CT 4-6 WKS


        


       





DR SMITH


   


      


       


  





 Problem List 





- Problems


(1) Acute on chronic respiratory failure with hypoxia and hypercapnia


Code(s): J96.21 - ACUTE AND CHRONIC RESPIRATORY FAILURE WITH HYPOXIA; J96.22 - 

ACUTE AND CHRONIC RESPIRATORY FAILURE WITH HYPERCAPNIA   





(2) SOB (shortness of breath)


Code(s): R06.02 - SHORTNESS OF BREATH   





(3) COPD exacerbation


Code(s): J44.1 - CHRONIC OBSTRUCTIVE PULMONARY DISEASE W (ACUTE) EXACERBATION   





(4) HTN (hypertension)


Code(s): I10 - ESSENTIAL (PRIMARY) HYPERTENSION   





(5) Tobacco abuse


Code(s): Z72.0 - TOBACCO USE   





(6) Tobacco abuse counseling


Code(s): Z71.6 - TOBACCO ABUSE COUNSELING

## 2019-12-30 NOTE — PN
Progress Note, Physician


Chief Complaint: 





SLOWLY IMPROVING


ECHO TODAY





- Current Medication List


Current Medications: 


Active Medications





Albuterol Sulfate (Ventolin 0.083% Nebulizer Soln -)  1 amp NEB Q4H PRN


   PRN Reason: SHORT OF BREATH/WHEEZING


Albuterol/Ipratropium (Duoneb -)  1 amp NEB [facility]QID [facility]


   Last Admin: 12/30/19 08:08 Dose:  1 amp


Atorvastatin Calcium (Lipitor -)  80 mg PO HS [facility]


   Last Admin: 12/29/19 21:43 Dose:  80 mg


Benzocaine/Menthol (Cepacol Lozenge -)  1 each MM Q4H PRN


   PRN Reason: SORE THROAT


Famotidine (Pepcid -)  40 mg PO DAILY [facility]


   Last Admin: 12/29/19 09:17 Dose:  40 mg


Heparin Sodium (Porcine) (Heparin -)  5,000 unit SQ BID [facility]


   Last Admin: 12/29/19 21:43 Dose:  5,000 unit


Azithromycin 250 mg/ Dextrose  250 mls @ 250 mls/hr IVPB DAILY [facility]


   Last Admin: 12/29/19 09:17 Dose:  250 mls/hr


Methylprednisolone Sodium Succinate (Solu-Medrol -)  40 mg IVPUSH Q8H-IV [facility]


   Last Admin: 12/30/19 01:00 Dose:  40 mg











- Objective


Vital Signs: 


 Vital Signs











Temperature  97.8 F   12/30/19 06:00


 


Pulse Rate  76   12/30/19 06:00


 


Respiratory Rate  20   12/30/19 06:00


 


Blood Pressure  145/74   12/30/19 06:00


 


O2 Sat by Pulse Oximetry (%)  96   12/29/19 22:00











Constitutional: Yes: Mild Distress


Cardiovascular: Yes: Regular Rate and Rhythm


Respiratory: Yes: Diminished, On Nasal O2, Rhonchi, SOB


Gastrointestinal: Yes: Soft, Abdomen, Obese


Genitourinary: Yes: WNL


Musculoskeletal: Yes: WNL


Edema: No


Integumentary: Yes: WNL


Wound/Incision: Yes: Clean/Dry


Neurological: Yes: WNL


...Motor Strength: WNL


Psychiatric: Yes: WNL


Labs: 


 CBC, BMP





 12/28/19 06:00 





 12/28/19 06:00 





 INR, PTT











INR  0.93  (0.83-1.09)   12/26/19  20:15    














Problem List





- Problems


(1) COPD exacerbation


Code(s): J44.1 - CHRONIC OBSTRUCTIVE PULMONARY DISEASE W (ACUTE) EXACERBATION   





(2) Cough


Code(s): R05 - COUGH   





(3) Respiratory distress


Code(s): R06.03 - ACUTE RESPIRATORY DISTRESS   





(4) SOB (shortness of breath)


Code(s): R06.02 - SHORTNESS OF BREATH   





(5) Acute on chronic respiratory failure with hypoxia and hypercapnia


Code(s): J96.21 - ACUTE AND CHRONIC RESPIRATORY FAILURE WITH HYPOXIA; J96.22 - 

ACUTE AND CHRONIC RESPIRATORY FAILURE WITH HYPERCAPNIA   





(6) HTN (hypertension)


Code(s): I10 - ESSENTIAL (PRIMARY) HYPERTENSION   





(7) Tobacco abuse counseling


Code(s): Z71.6 - TOBACCO ABUSE COUNSELING   





Assessment/Plan





IV STEROIDS 02 SUPPORT


GI PROPHYLAXIS


PULMONARY EVAL APPRECIATED


RENAL F/U


OOB TO CHAIR


DVT PROPHYLAXIS


CHECK ECHO HEART FUNCTION AND EF%


DIETARY CONSULT

## 2019-12-31 VITALS — TEMPERATURE: 98.2 F | DIASTOLIC BLOOD PRESSURE: 85 MMHG | HEART RATE: 88 BPM | SYSTOLIC BLOOD PRESSURE: 122 MMHG

## 2019-12-31 LAB
ANION GAP SERPL CALC-SCNC: 4 MMOL/L (ref 8–16)
BUN SERPL-MCNC: 20.2 MG/DL (ref 7–18)
CALCIUM SERPL-MCNC: 9.4 MG/DL (ref 8.5–10.1)
CHLORIDE SERPL-SCNC: 98 MMOL/L (ref 98–107)
CO2 SERPL-SCNC: 37 MMOL/L (ref 21–32)
CREAT SERPL-MCNC: 0.9 MG/DL (ref 0.55–1.3)
DEPRECATED RDW RBC AUTO: 15.1 % (ref 11.9–15.9)
GLUCOSE SERPL-MCNC: 128 MG/DL (ref 74–106)
HCT VFR BLD CALC: 35.8 % (ref 35.4–49)
HGB BLD-MCNC: 11 GM/DL (ref 11.7–16.9)
MCH RBC QN AUTO: 27.8 PG (ref 25.7–33.7)
MCHC RBC AUTO-ENTMCNC: 30.9 G/DL (ref 32–35.9)
MCV RBC: 90.2 FL (ref 80–96)
PLATELET # BLD AUTO: 267 K/MM3 (ref 134–434)
PMV BLD: 8.5 FL (ref 7.5–11.1)
POTASSIUM SERPLBLD-SCNC: 4.9 MMOL/L (ref 3.5–5.1)
RBC # BLD AUTO: 3.97 M/MM3 (ref 4–5.6)
SODIUM SERPL-SCNC: 139 MMOL/L (ref 136–145)
WBC # BLD AUTO: 14.4 K/MM3 (ref 4–10)

## 2019-12-31 RX ADMIN — FAMOTIDINE SCH MG: 20 TABLET ORAL at 10:24

## 2019-12-31 RX ADMIN — IPRATROPIUM BROMIDE AND ALBUTEROL SULFATE SCH AMP: .5; 3 SOLUTION RESPIRATORY (INHALATION) at 07:30

## 2019-12-31 RX ADMIN — IPRATROPIUM BROMIDE AND ALBUTEROL SULFATE SCH AMP: .5; 3 SOLUTION RESPIRATORY (INHALATION) at 11:30

## 2019-12-31 RX ADMIN — METHYLPREDNISOLONE SODIUM SUCCINATE SCH MG: 40 INJECTION, POWDER, FOR SOLUTION INTRAMUSCULAR; INTRAVENOUS at 10:23

## 2019-12-31 RX ADMIN — HEPARIN SODIUM SCH UNIT: 5000 INJECTION, SOLUTION INTRAVENOUS; SUBCUTANEOUS at 10:23

## 2019-12-31 RX ADMIN — IPRATROPIUM BROMIDE AND ALBUTEROL SULFATE SCH AMP: .5; 3 SOLUTION RESPIRATORY (INHALATION) at 15:30

## 2019-12-31 NOTE — DS
Physical Examination


Vital Signs: 


 Vital Signs











Temperature  98.4 F   12/31/19 05:00


 


Pulse Rate  95 H  12/31/19 05:00


 


Respiratory Rate  24 H  12/31/19 05:00


 


Blood Pressure  156/91   12/31/19 05:00


 


O2 Sat by Pulse Oximetry (%)  97   12/30/19 21:00











Constitutional: Yes: No Distress


Eyes: Yes: WNL


HENT: Yes: WNL


Neck: Yes: WNL


Cardiovascular: Yes: Regular Rate and Rhythm


Respiratory: Yes: Diminished


Gastrointestinal: Yes: Soft, Abdomen, Obese


Edema: No


Labs: 


 CBC, BMP





 12/31/19 05:53 





 12/31/19 05:53 











Discharge Summary


Problems reviewed: Yes


Reason For Visit: SOB, RESPIRATORY DISTRESS, ACUTE EXACERBATION COPD


Current Active Problems





COPD exacerbation (Acute)


Cough (Acute)


Respiratory distress (Acute)


SOB (shortness of breath) (Acute)








Procedures: Principal: STEROID IV THERAPY, 02 SUPPORT


Other Procedures: CT SCAN/ECHO


Plan of Treatment: 


IV STEROIDS, NEBS, PULM/CARDIO WORKUP CAN COMPLETE AS OUTPATIENT


Condition: Improved





- Instructions


Diet, Activity, Other Instructions: 


PREDNISONE THERAPY TAPER DETAILED IN NOTES


SEE YOUR PRIMARY DOCTOR IN 1 WEEK


Referrals: 


Luigi Perez MD [Primary Care Provider] - 


Disposition: SKILLED NURSING FACILITY





- Home Medications


Comprehensive Discharge Medication List: 


Ambulatory Orders





Atorvastatin Ca [Lipitor] 80 mg PO HS 03/07/19 


Furosemide [Lasix] 40 mg PO DAILY 03/07/19 


Ranitidine [Zantac -] 300 mg PO DAILY 03/07/19 


Albuterol 2.5/Ipratropium 0.5 [Duoneb -] 1 amp NEB PRN PRN 03/08/19 


Lisinopril [Prinivil] 20 mg PO DAILY #30 tablet 03/16/19 


Albuterol Sulfate Inhaler - [Ventolin HFA Inhaler -] 1 inh PO Q4H PRN 12/26/19 


Albuterol 2.5/Ipratropium 0.5 [Duoneb -] 1 amp NEB RQID  amp 12/31/19 


Prednisone See Taper PO DAILY #60 tablet 12/31/19 








Prescription Drug Monitoring Program (I-STOP) results: I-STOP not reviewed

## 2020-02-15 ENCOUNTER — HOSPITAL ENCOUNTER (EMERGENCY)
Dept: HOSPITAL 74 - JER | Age: 65
Discharge: HOME | End: 2020-02-15
Payer: COMMERCIAL

## 2020-02-15 VITALS — BODY MASS INDEX: 38.9 KG/M2

## 2020-02-15 VITALS — DIASTOLIC BLOOD PRESSURE: 72 MMHG | SYSTOLIC BLOOD PRESSURE: 136 MMHG

## 2020-02-15 VITALS — TEMPERATURE: 97.6 F

## 2020-02-15 VITALS — HEART RATE: 88 BPM

## 2020-02-15 DIAGNOSIS — I25.10: ICD-10-CM

## 2020-02-15 DIAGNOSIS — Z98.61: ICD-10-CM

## 2020-02-15 DIAGNOSIS — J45.998: ICD-10-CM

## 2020-02-15 DIAGNOSIS — J44.9: ICD-10-CM

## 2020-02-15 DIAGNOSIS — I10: ICD-10-CM

## 2020-02-15 DIAGNOSIS — R22.42: Primary | ICD-10-CM

## 2020-02-15 DIAGNOSIS — Z99.81: ICD-10-CM

## 2020-02-15 DIAGNOSIS — M79.89: ICD-10-CM

## 2020-02-15 DIAGNOSIS — D64.9: ICD-10-CM

## 2020-02-15 LAB
ALBUMIN SERPL-MCNC: 3.6 G/DL (ref 3.4–5)
ALP SERPL-CCNC: 88 U/L (ref 45–117)
ALT SERPL-CCNC: 19 U/L (ref 13–61)
ANION GAP SERPL CALC-SCNC: 5 MMOL/L (ref 8–16)
AST SERPL-CCNC: 16 U/L (ref 15–37)
BASOPHILS # BLD: 0.8 % (ref 0–2)
BILIRUB SERPL-MCNC: 0.2 MG/DL (ref 0.2–1)
BUN SERPL-MCNC: 17.8 MG/DL (ref 7–18)
CALCIUM SERPL-MCNC: 9.5 MG/DL (ref 8.5–10.1)
CHLORIDE SERPL-SCNC: 98 MMOL/L (ref 98–107)
CO2 SERPL-SCNC: 33 MMOL/L (ref 21–32)
CREAT SERPL-MCNC: 1.1 MG/DL (ref 0.55–1.3)
DEPRECATED RDW RBC AUTO: 14.9 % (ref 11.9–15.9)
EOSINOPHIL # BLD: 0.2 % (ref 0–4.5)
GLUCOSE SERPL-MCNC: 121 MG/DL (ref 74–106)
HCT VFR BLD CALC: 34.1 % (ref 35.4–49)
HGB BLD-MCNC: 10.9 GM/DL (ref 11.7–16.9)
LYMPHOCYTES # BLD: 11.7 % (ref 8–40)
MCH RBC QN AUTO: 28.7 PG (ref 25.7–33.7)
MCHC RBC AUTO-ENTMCNC: 31.9 G/DL (ref 32–35.9)
MCV RBC: 90 FL (ref 80–96)
MONOCYTES # BLD AUTO: 4.1 % (ref 3.8–10.2)
NEUTROPHILS # BLD: 83.2 % (ref 42.8–82.8)
PLATELET # BLD AUTO: 338 K/MM3 (ref 134–434)
PMV BLD: 8 FL (ref 7.5–11.1)
POTASSIUM SERPLBLD-SCNC: 4.9 MMOL/L (ref 3.5–5.1)
PROT SERPL-MCNC: 7.5 G/DL (ref 6.4–8.2)
RBC # BLD AUTO: 3.79 M/MM3 (ref 4–5.6)
SODIUM SERPL-SCNC: 137 MMOL/L (ref 136–145)
WBC # BLD AUTO: 8 K/MM3 (ref 4–10)

## 2020-02-15 PROCEDURE — 3E0F7GC INTRODUCTION OF OTHER THERAPEUTIC SUBSTANCE INTO RESPIRATORY TRACT, VIA NATURAL OR ARTIFICIAL OPENING: ICD-10-PCS

## 2020-02-15 RX ADMIN — IPRATROPIUM BROMIDE AND ALBUTEROL SULFATE SCH AMP: .5; 3 SOLUTION RESPIRATORY (INHALATION) at 15:47

## 2020-02-15 RX ADMIN — IPRATROPIUM BROMIDE AND ALBUTEROL SULFATE SCH AMP: .5; 3 SOLUTION RESPIRATORY (INHALATION) at 15:22

## 2020-02-15 RX ADMIN — IPRATROPIUM BROMIDE AND ALBUTEROL SULFATE SCH AMP: .5; 3 SOLUTION RESPIRATORY (INHALATION) at 15:23

## 2020-02-15 NOTE — PDOC
History of Present Illness





- General


Chief Complaint: Shortness of Breath


Stated Complaint: SENT BY DR JUNIOR/SWOLLEN LEG


Time Seen by Provider: 02/15/20 13:07


History Source: Patient, Primary Care Provider


Exam Limitations: No Limitations





- History of Present Illness


Initial Comments: 





02/15/20 13:36


PATIENT IS MR #[id number]





HPI: 63yo M PMH Asthma, COPD (steroid dependent), Non-occlusive CAD by cath at 

[facility], CHF, HTN, HLD, tobacco abuse, sent in from PCP office for LLE swelling and 

pain for 1.5 months. Patient was admitted here in December for respiratory 

failure, reports being discharged Dec 31st, with mild LLE swelling at that 

time. Not on any AC, no history of blood clots. No chest pain, no diaphoresis. 

Explains that he uses 2L home O2, chronic steroids (dependent per PCP), 

normally Sats around 92-94%, takes frequent breathing treatments for chronic 

wheezing. States that today he is breathing at his baseline and it is not 

concerning to him. No hemoptysis.  Took a breathing treatment at 9AM this 

morning before coming in. PCP concerned for possible DVT. Take Tylenol for 

bilateral leg pain with relief. 





All: NKDA


Meds: PER chart ([id number])


PMH: As above


PSH: Denies


SHx: +Tobacco use, Denies ETOH, Denies Illicits











Past History





- Travel


Traveled outside of the country in the last 30 days: No


Close contact w/someone who was outside of country & ill: No





- Past Medical History


Allergies/Adverse Reactions: 


 Allergies











Allergy/AdvReac Type Severity Reaction Status Date / Time


 


No Known Allergies Allergy   Unverified 02/15/20 13:42











Home Medications: 


Ambulatory Orders





Atorvastatin Ca [Lipitor] 80 mg PO HS 02/15/20 


Furosemide [Lasix] 40 mg PO DAILY 02/15/20 


Lisinopril [Prinivil] 20 mg PO DAILY 02/15/20 


Potassium Chloride [K-Dur -] 10 meq PO DAILY 02/15/20 


predniSONE [Deltasone -] 20 mg PO DAILY 02/15/20 











- Psycho Social/Smoking Cessation Hx


Smoking History: Current every day smoker


Number of Cigarettes Smoked Daily: 3


Information on smoking cessation initiated: No


Hx Alcohol Use: No


Drug/Substance Use Hx: No





**Review of Systems





- Review of Systems


Able to Perform ROS?: Yes


Is the patient limited English proficient: Yes


Constitutional: No: Chills, Diaphoresis, Fever, Weakness


HEENTM: No: Nose Congestion, Throat Pain


Respiratory: Yes: See HPI, Cough, Shortness of Breath, Wheezing.  No: Orthopnea

, Hemoptysis


Cardiac (ROS): No: Chest Pain, Edema, Irregular Heart Rate, Lightheadedness, 

Palpitations, Chest Tightness


ABD/GI: No: Constipated, Diarrhea, Nausea, Vomiting


: No: Burning, Dysuria, Frequency


Musculoskeletal: No: Muscle Pain, Muscle Weakness, Neck Pain


Integumentary: No: Bruising, Pruritus, Rash, Sweating


Neurological: No: Headache, Numbness, Tingling, Weakness


Psychiatric: No: Stressors, Change in Appetite


Endocrine: No: Increased Thirst, Increased Urine


Hematologic/Lymphatic: No: Anemia, Blood Clots, Easy Bleeding


All Other Systems: Reviewed and Negative





*Physical Exam





- Vital Signs


 Last Vital Signs











Temp Pulse Resp BP Pulse Ox


 


 97.6 F   89   24 H  130/67   97 


 


 02/15/20 12:39  02/15/20 12:39  02/15/20 12:39  02/15/20 12:39  02/15/20 12:39














- Physical Exam





02/15/20 15:11


Vitals reviewed, AF, O2 at 98% on 2L NC, HR, BP wnl, mildly tachypnic at 24


GEN: Obese, appears stated age, no acute distress, comfortable. AAOx3.


HEENT: NCAT, EOMI, PERRL. Sclera anicteric, noninjected. No facial asymmetry. 

Moist mucous membranes. Normal voice. Trachea midline. 


CV: RRR, S1/S2, no murmurs / rubs / gallops appreciated.


LUNG: Normal work of breathing. +diffuse wheezes, no rales or rhonchi. +Cough. 

Speaking full sentences. 


GI: Soft, NTND, +BS, no guarding, no rebound. No masses. Neg CVAT b/l. 


EXTREMITIES: 2+ distal pulses. +LLE pitting edema to the knee, tender to 

palpation, RLE also tender, no edema. 


SKIN: Warm, dry, no rashes appreciated, non-jaundiced. No warmth / erythema on 

swollen LLE. 


PSYCH: Normal mood and affect. Cooperative and appropriate. 


NEURO: CN grossly intact. Moving all extremities well. Normal strength and 

sensation grossly. 








ED Treatment Course





- LABORATORY


CBC & Chemistry Diagram: 


 02/15/20 13:30





 02/15/20 13:30





- RADIOLOGY


Radiology Studies Ordered: 














 Category Date Time Status


 


 CHEST X-RAY PORTABLE* [RAD] Stat Radiology  02/15/20 13:29 Ordered














Medical Decision Making





- Medical Decision Making





02/15/20 15:09


63yo M PMH Asthma, COPD (steroid dependent), Non-occlusive CAD by cath at [facility], 

CHF, HTN, HLD, tobacco abuse, sent in from PCP office for LLE swelling and pain 

for 1.5 months History notable for progressive unilateral leg swelling, chronic 

SOB and wheezing without recent change. Exam with pitting edema on LLE, stable (

baseline) vitals. DDX: DVT, CHF exacerbation, COPD exacerbation, Asthma 

exacerbation, less likely PNA, influenza.





- CBC, CMP, Cardiac Profile, BNP


- DVT US


- Low threshold for CT Chest





02/15/20 15:26


- No leukocytosis, mild anemia


- Negative troponin, unremarkable BNP


- Electrolytes wnl, Renal function and LFTs normal


- CXR without acute pathology 





-EKG (compared with 12/26 EKG in other chart): 80bpm, NSR, normal axis, normal 

intervals (QTc 422), T wave inversions v1,v2 unchanged from prior





- No DVT on US





- Call placed to Dr. Mendez Junior, no answering service, not in call book, 

patient without cell number





Dispo: Home








Discharge





- Discharge Information


Problems reviewed: Yes


Clinical Impression/Diagnosis: 


 Swelling of left lower extremity





Condition: Guarded


Disposition: HOME





- Admission


No





- Follow up/Referral


Referrals: 


Mendez Junior MD [Primary Care Provider] - 





- Patient Discharge Instructions


Additional Instructions: 


You were seen and evaluated for leg swelling. 





Please continue your home medications as directed. 





Follow up with your primary care doctor as scheduled on Tuesday 2/18.





Return to the ED for any new or concerning symptoms. 





- Post Discharge Activity

## 2020-02-15 NOTE — PDOC
Documentation entered by Carrie Larson SCRIBE, acting as scribe for Celsa Garcia MD.








Celsa Garcia MD:  This documentation has been prepared by the Marsha landers Nirvannie, SCRIBE, under my direction and personally reviewed by me in 

its entirety.  I confirm that the documentation accurately reflects all work, 

treatment, procedures, and medical decision making performed by me.  





Attending Attestation





- Resident


Resident Name: Jaron Reeevs





- ED Attending Attestation


I have performed the following: I have examined & evaluated the patient, The 

case was reviewed & discussed with the resident, I agree w/resident's findings 

& plan, Exceptions are as noted





- HPI


HPI: 





02/15/20 13:45


64YOM with a significant past medical history of COPD/asthma (steroid and 2L O2 

dependent at night), nonocclusive CAD (s/p cardiac catheterizations at [facility]), 

Hypertension, and recent admission 12/26-31 for COPD exacerbation who presents 

to the ED with 1.5 months of left lower extremity and foot swelling with pain. 

Patient was advised by his PCP Dr. Rivera to report to the ED for further 

evaluation for rule out DVT, with the note written from 2/14/20..





Denies fever, chills, chest pain, new SOB, palpitation, dizziness, weakness, N, 

V, D, abdominal pain, bladder and bowel problems. No sick contacts or travel. 

No new changes in medications.





Allergies: None


Past Medical History: 


Social history: Lives with family. No tobacco, ETOH or drug use.


Surgical history: Cardiac catheterization. 


Meds: as documented in EMR


PMD:  Dr. Rivera 


Alternate MRN: [id number]


02/16/20 23:00








- Physicial Exam


PE: 





02/15/20 13:46


NAD, well appearing, EOMI, PERRL, nl conjunctiva, anicteric; neck supple. +

diffuse expiratory wheezing. On supplemental O2, RRR, abdomen soft nontender. 

No rebound, no guarding. Back nontender. FISHMAN x4, +LLE: 2+ pitting edema, 

bilateral tenderness R>L. no focal neuro deficits. No peripheral edema. normal 

color for ethnicity, +dry skin. WWP.





- Medical Decision Making





02/15/20 13:50


Vital Signs











Temp Pulse Resp BP Pulse Ox


 


 97.6 F   89   24 H  130/67   97 


 


 02/15/20 12:39  02/15/20 12:39  02/15/20 12:39  02/15/20 12:39  02/15/20 12:39





DDX PE, ACS, arrhythmia, pleurisy, copd and asthma exac. DVT. neuropathy. PTX, 

infection, CHF, pulmonary edema. NSTEMI





Laboratory results are reviewed, no WBC count.  Anemia is noted, H/H 10.9/34.1, 

no priors to compare to.  Creatinine electrolytes are normal.  Troponin is 

negative BNP is also negative so unlikely to be CHF or cardiomyopathy.  No 

elevations in troponin or BNP to suggest PE.


Duplex to evaluate for DVT in bilateral extremities with left swelling greater 

than right and bilateral leg pain.


On baseline supplemental oxygen for his COPD/asthma.


Given duo nebs for wheezing.  Also on steroids chronically for management of 

his COPD/asthma.








02/15/20 15:38


b/l duplex is neg for DVT, if persistent sx, repeat in 1 week


no e/o end organ damage, no acute pathology on cxr.


baseline wheezing and already on steroids, 


normal sats, no respiratory distress.


trop/bnp neg, labs and lytes wnl.


VS also wnl, no acute distress and able to ambulate without distress.





will call back PCP who sent him here, attempted to call the [place] office, 

no answer and unable to leave voicemail.


pt has pcp appt early  next week: Dr Rivera 





Pt to be discharged in stable condition. Patient and family made aware of 

clinical impression, treatment recommendations and disposition plan, return 

precautions discussed (including but not limited to new or persistent/worsening 

symptoms, pain, fevers, or signs of infection, chest pain, respiratory distress

, inability to tolerate oral intake, dehydration, syncope, or neurologic changes

). Follow up with PMD as recommended, follow up information provided, take 

medications as instructed for duration of time. continue with supportive care, 

avoid triggers and precipitants. All questions answered to patient's 

satisfaction and expressed understanding and comfort with this. At the time of 

discharge, the patient is alert, clinically improved, tolerating po and 

verbalizes understanding of instructions, satisfied with the care received and 

felt comfortable with the plan. Patient does not suffer from an acute life-

threatening medical condition at this time and  is safe for outpatient follow-

up. 





02/16/20 22:59





02/16/20 23:00








**Heart Score/ECG Review


  ** #1


ECG reviewed & interpreted by me at: 13:30


General ECG Interpretation: Sinus Rhythm, Normal Rate, Normal Intervals





02/15/20 13:51


EKG normal sinus rhythm 80 bpm transfer tech, no interval abnormalities, narrow 

QRS, ST and T wave segments and morphology normal. Nonspecific T wave 

abnormalities

## 2020-02-17 NOTE — EKG
Test Reason : 

Blood Pressure : ***/*** mmHG

Vent. Rate : 080 BPM     Atrial Rate : 080 BPM

   P-R Int : 176 ms          QRS Dur : 090 ms

    QT Int : 366 ms       P-R-T Axes : 031 067 058 degrees

   QTc Int : 422 ms

 

NORMAL SINUS RHYTHM

T WAVE ABNORMALITY, CONSIDER ANTERIOR ISCHEMIA

ABNORMAL ECG

NO PREVIOUS ECGS AVAILABLE

Confirmed by Lefty Freeman ([id number]) on 2/17/2020 10:23:52 AM

 

Referred By:             Confirmed By:Lefty Freeman

## 2020-07-20 ENCOUNTER — HOSPITAL ENCOUNTER (EMERGENCY)
Dept: HOSPITAL 74 - JER | Age: 65
Discharge: HOME | End: 2020-07-20
Payer: COMMERCIAL

## 2020-07-20 VITALS — BODY MASS INDEX: 37.6 KG/M2

## 2020-07-20 VITALS — HEART RATE: 82 BPM | DIASTOLIC BLOOD PRESSURE: 78 MMHG | SYSTOLIC BLOOD PRESSURE: 153 MMHG | TEMPERATURE: 99 F

## 2020-07-20 DIAGNOSIS — K62.5: Primary | ICD-10-CM

## 2020-07-20 LAB
ALBUMIN SERPL-MCNC: 3.5 G/DL (ref 3.4–5)
ALP SERPL-CCNC: 55 U/L (ref 45–117)
ALT SERPL-CCNC: 22 U/L (ref 13–61)
ANION GAP SERPL CALC-SCNC: 5 MMOL/L (ref 8–16)
APTT BLD: 31 SECONDS (ref 25.2–36.5)
AST SERPL-CCNC: 13 U/L (ref 15–37)
BASOPHILS # BLD: 0.3 % (ref 0–2)
BILIRUB SERPL-MCNC: 0.5 MG/DL (ref 0.2–1)
BUN SERPL-MCNC: 13.8 MG/DL (ref 7–18)
CALCIUM SERPL-MCNC: 9.4 MG/DL (ref 8.5–10.1)
CHLORIDE SERPL-SCNC: 101 MMOL/L (ref 98–107)
CO2 SERPL-SCNC: 34 MMOL/L (ref 21–32)
CREAT SERPL-MCNC: 1.1 MG/DL (ref 0.55–1.3)
DEPRECATED RDW RBC AUTO: 15 % (ref 11.9–15.9)
EOSINOPHIL # BLD: 0.2 % (ref 0–4.5)
GLUCOSE SERPL-MCNC: 109 MG/DL (ref 74–106)
HCT VFR BLD CALC: 32.7 % (ref 35.4–49)
HGB BLD-MCNC: 10.3 GM/DL (ref 11.7–16.9)
INR BLD: 0.97 (ref 0.83–1.09)
LYMPHOCYTES # BLD: 10.5 % (ref 8–40)
MAGNESIUM SERPL-MCNC: 2 MG/DL (ref 1.8–2.4)
MCH RBC QN AUTO: 29.7 PG (ref 25.7–33.7)
MCHC RBC AUTO-ENTMCNC: 31.6 G/DL (ref 32–35.9)
MCV RBC: 94 FL (ref 80–96)
MONOCYTES # BLD AUTO: 5.4 % (ref 3.8–10.2)
NEUTROPHILS # BLD: 83.6 % (ref 42.8–82.8)
PLATELET # BLD AUTO: 308 K/MM3 (ref 134–434)
PMV BLD: 7.8 FL (ref 7.5–11.1)
POTASSIUM SERPLBLD-SCNC: 4.7 MMOL/L (ref 3.5–5.1)
PROT SERPL-MCNC: 7 G/DL (ref 6.4–8.2)
PT PNL PPP: 11.4 SEC (ref 9.7–13)
RBC # BLD AUTO: 3.48 M/MM3 (ref 4–5.6)
SODIUM SERPL-SCNC: 140 MMOL/L (ref 136–145)
WBC # BLD AUTO: 9.6 K/MM3 (ref 4–10)

## 2020-07-20 NOTE — PDOC
History of Present Illness





- General


Chief Complaint: Rectal Bleed


Stated Complaint: BLOOD IN STOOL


Time Seen by Provider: 07/20/20 18:13





- History of Present Illness


Initial Comments: 





64 YOM h/o COPD presents with blood in stool of one day duration. Patient 

reports that he woke up this AM, had a bowel movement and noted that his stool 

was diffusely red, the water in the toilet bowl was also red, as well as the 

toilet paper after wiping. He denies any abdominal pain, rectal pain including 

during defecation, denies h/o diverticulitis/ diverticulosis, use of blood 

thinners, h/o cardiac dysrythmia or clots, CP, SOB, N/V/D, fever or chills. 





Constitutional:  No Weight Change, No Fever, No Chills, No Night Sweats, No 

Fatigue, No Malaise


ENT/Mouth:  No Hearing Changes, No Ear Pain, No Nasal Congestion, No Sinus Pain,

No Hoarseness, No sore throat, No Rhinorrhea, No Swallowing Difficulty


Eyes:  No Eye Pain, No Swelling, No Redness, No Foreign Body, No Discharge, No 

Vision Changes


Cardiovascular:  No Chest Pain, No SOB, No PND, No Dyspnea on Exertion, No 

Orthopnea, No Claudication, No Edema, No Palpitations


Respiratory:  No Cough, No Sputum, No Wheezing, No Smoke Exposure, No Dyspnea


Gastrointestinal:  Yes hematochezia, No Nausea, No Vomiting, No Diarrhea, No 

Constipation, No Pain, No Heartburn, No Anorexia, No Dysphagia, No Flatulence, 

No Jaundice


Genitourinary:  No Dysmenorrhea, No DUB, No Dyspareunia, No Dysuria, No Urinary 

Frequency, No Hematuria, No Urinary Incontinence, No Urgency, No Flank Pain, No 

Urinary Flow Changes, No Hesitancy


Musculoskeletal:  No Arthralgias, No Myalgias, No Joint Swelling, No Joint 

Stiffness, No Back Pain, No Neck Pain, No Injury History


Skin:  No Skin Lesions, No Pruritis, No Hair Changes, No Breast/Skin Changes, No

Nipple Discharge


Neuro:  No Weakness, No Numbness, No Paresthesias, No Loss of Consciousness, No 

Syncope, No Dizziness, No Headache, No Coordination Changes, No Recent Falls


Psych:  No Anxiety/Panic, No Depression, No Insomnia, No Personality Changes, No

Delusions, No Rumination, No SI/HI/AH/VH, No Social Issues, No Memory Changes, 

No Violence/Abuse Hx., No Eating Concerns


Heme/Lymph:  No Bruising, No Bleeding, No Transfusions History, No 

Lymphadenopathy


Endocrine:  No Polyuria, No Polydipsia, No Temperature Intolerance





07/20/20 22:40





07/20/20 22:42








Past History





- Medical History


Allergies/Adverse Reactions: 


                                    Allergies











Allergy/AdvReac Type Severity Reaction Status Date / Time


 


No Known Allergies Allergy   Unverified 07/20/20 17:33











Home Medications: 


Ambulatory Orders





Atorvastatin Ca [Lipitor] 80 mg PO HS 02/15/20 


Furosemide [Lasix] 40 mg PO DAILY 02/15/20 


Lisinopril [Prinivil] 20 mg PO DAILY 02/15/20 


Potassium Chloride [K-Dur -] 10 meq PO DAILY 02/15/20 


predniSONE [Deltasone -] 20 mg PO DAILY 02/15/20 








Cardiac Disorders: Yes (CAD)


COPD: Yes (O2 2L @HS)


HTN: Yes


Hypercholesterolemia: Yes





- Surgical History


Appendectomy: Yes





- Psycho-Social/Smoking History


Smoking History: Never smoked


Have you smoked in the past 12 months: No


Number of Cigarettes Smoked Daily: 3


Information on smoking cessation initiated: No





- Substance Abuse Hx (Audit-C & DAST Scrn)


How often the patient has a drink containing alcohol: Never


Score: In Men: 4 or > Positive; In Women: 3 or > Positive: 0


Screen Result (Pos requires Nsg. Audit-10AR): Negative


In the last yr the pt used illegal drug/Rx for NonMed reason: No


Score:  Yes response is considered Positive: 0


Screen Result (Positive result requires Nsg. DAST-10): Negative





*Physical Exam





- Vital Signs


                                Last Vital Signs











Temp Pulse Resp BP Pulse Ox


 


 98.2 F   95 H  22 H  135/69   97 


 


 07/20/20 17:31  07/20/20 17:31  07/20/20 17:31  07/20/20 17:31  07/20/20 17:31














- Physical Exam


General Appearance: Yes: Nourished, Appropriately Dressed, Mild Distress


HEENT: positive: EOMI, LINDA, Normal ENT Inspection


Neck: positive: Trachea midline, Normal Thyroid


Respiratory/Chest: positive: Lungs Clear, Normal Breath Sounds, Respiratory 

Distress


Cardiovascular: positive: Regular Rhythm, Regular Rate, S1, S2


Gastrointestinal/Abdominal: positive: Normal Bowel Sounds, Flat, Soft


Rectal Exam: positive: normal exam, NL Prostate, normal rectal tone, heme 

positive stool


Musculoskeletal: positive: Normal Inspection, CVA Tenderness


Extremity: positive: Normal Capillary Refill, Normal Inspection


Integumentary: positive: Normal Color, Dry, Warm


Neurologic: positive: CNs II-XII NML intact, Fully Oriented, Alert, Normal 

Mood/Affect, Normal Response, Motor Strength 5/5





ED Treatment Course





- LABORATORY


CBC & Chemistry Diagram: 


                                 07/20/20 18:47





                                 07/20/20 18:47





Medical Decision Making





- Medical Decision Making





64 YOM h/o COPD presents with blood in stool of one day duration. Patient 

reports that he woke up this AM, had a bowel movement and noted that his stool 

was diffusely red, the water in the toilet bowl was also red, as well as the 

toilet paper after wiping. He denies any abdominal pain, rectal pain including 

during defecation, denies h/o diverticulitis/ diverticulosis, use of blood 

thinners, h/o cardiac dysrythmia or clots, CP, SOB, N/V/D, fever or chills. 

Physical exam wnl, rectal exam not revealing of any gross abnormalities. 





                                Selected Entries











  07/20/20





  17:31


 


Temperature 98.2 F


 


Pulse Rate 95 H


 


Respiratory 22 H





Rate 


 


Blood Pressure 135/69


 


O2 Sat by Pulse 97





Oximetry (%) 


 


Weight 115.666 kg








ddx includes but is not limited to: hemorrhoid, anal fissure diverticulitis, 

diverticulosis, AVM, mesenteric ischemia





plan: Hemmocult, CT abdomen and pelvis, CBC, CMP. 





reassess: hemmocult was positive, labs wnl, CT shows some evidence of small bow

el wall thickening however no evidence of diverticulitis, vitals currently 

stable. 





dispo: will discharge patient to follow up with PCP and GI. 





07/20/20 22:45





07/20/20 22:47








Discharge





- Discharge Information


Problems reviewed: Yes


Clinical Impression/Diagnosis: 


 Rectal bleeding





Clinical Impression/Diagnosis: 


 (Ruled Out): Rectal arterial hemorrhage


Condition: Good





- Admission


No





- Follow up/Referral


Referrals: 


Mendez Junior MD [Primary Care Provider] - 


Marito Batres MD [Staff Physician] - 





- Patient Discharge Instructions


Additional Instructions: 


You were seen in the emergency department for a rectal bleed. You received labs 

and imagining. You had a positive hemocult test which confirmed the presence of 

blood in your stool. Your labs were otherwise normal. Your CT did not show 

evidence of diverticulitis however did show some thickening of the small bowel 

wall of uncertain significance. You were considered medically stable. We 

recommend you follow up with your primary care provider as well as a GI doctor 

to further investigate the blood in your stool and the findings on your CT scan.

Please return if you experience a worsening of your symptoms, if you become 

fatigued or experience chest pain, shortness of breath, nausea, vomiting, 

diarrhea, fever or chills. 





- Post Discharge Activity

## 2020-07-20 NOTE — PDOC
Attending Attestation





- Resident


Resident Name: Paras Dsouza





- ED Attending Attestation


I have performed the following: I have examined & evaluated the patient, The 

case was reviewed & discussed with the resident, I agree w/resident's findings &

plan, Exceptions are as noted





- Medical Decision Making





07/20/20 18:16


63 yo Male with past medical history of asthma, COPD, home O2 dependent, and 

chronic steroids, saturation is usually between 92-94 %, coronary artery 

disease, hypertension, hyperlipidemia, CHF


07/20/20 18:18


meds - Lisinopril, Lasix, Lipitor, prednisone, kdur





Discharge





- Follow up/Referral


Referrals: 


Mendez Junior MD [Primary Care Provider] - 





- Patient Discharge Instructions





- Post Discharge Activity

## 2020-07-20 NOTE — PDOC
Documentation entered by Vernell Blas SCRIBE, acting as scribe for 

Uyen Swanson MD.








Uyen Swanson MD:  This documentation has been prepared by the Josué alnders Xhesika, SCRIBE, under my direction and personally reviewed by me in its 

entirety.  I confirm that the documentation accurately reflects all work, 

treatment, procedures, and medical decision making performed by me.  





Attending Attestation





- Resident


Resident Name: LiannaParas





- ED Attending Attestation


I have performed the following: I have examined & evaluated the patient, The 

case was reviewed & discussed with the resident, I agree w/resident's findings &

plan, Exceptions are as noted





- HPI


HPI: 





07/20/20 18:25


The patient is a 63y/o M  with a significant past medical history of COPD/asthma

(steroid and 2L O2 dependent at night), nonocclusive CAD (s/p cardiac 

catheterizations at [facility]), Hypertension,who presents to the ED with 3 episodes of

rectal bleeding today.





Denies fever, chills, chest pain, new SOB, palpitation, dizziness, weakness, N, 

V, D, abdominal pain, bladder and bowel problems. No sick contacts or travel. No

new changes in medications.





Allergies: NKDA


Surgical history: Cardiac catheterization. 


PMD:  Dr. Junior 





- Physicial Exam


PE: 





07/20/20 20:29


 64-year-old male presents with rectal bleeding with bowel movements


head ncat


neck supple


lungs cta b/l


cvs [id number]


abd distended, nontender


extremities no erythema, no deformities


skin warm  and dry


neuro axox3,motor strength 5/5 b/l





- Medical Decision Making





07/20/20 22:39


ct scan abd/pel: no diverticulitis





pt states he will call Dr Junior tomorrow  to arrange for further GI 

evaluation 











Discharge





- Discharge Information


Problems reviewed: Yes


Clinical Impression/Diagnosis: 


 Rectal bleeding





Condition: Good





- Follow up/Referral


Referrals: 


Mendez Junior MD [Primary Care Provider] - 


Marito Batres MD [Staff Physician] - 





- Patient Discharge Instructions


Additional Instructions: 


You were seen in the emergency department for a rectal bleed. You received labs 

and imagining. You had a positive hemocult test which confirmed the presence of 

blood in your stool. Your labs were otherwise normal. Your CT did not show 

evidence of diverticulitis however did show some thickening of the small bowel 

wall of uncertain significance. You were considered medically stable. We 

recommend you follow up with your primary care provider as well as a GI doctor 

to further investigate the blood in your stool and the findings on your CT scan.

Please return if you experience a worsening of your symptoms, if you become 

fatigued or experience chest pain, shortness of breath, nausea, vomiting, 

diarrhea, fever or chills. 





- Post Discharge Activity

## 2020-07-20 NOTE — PDOC
Rapid Medical Evaluation


Medical Evaluation: 


                                    Allergies











Allergy/AdvReac Type Severity Reaction Status Date / Time


 


No Known Allergies Allergy   Unverified 07/20/20 17:33








Vital Signs











Temp Pulse Resp BP Pulse Ox


 


 98.2 F   95 H  22 H  135/69   97 


 


 07/20/20 17:31  07/20/20 17:31  07/20/20 17:31  07/20/20 17:31  07/20/20 17:31








07/20/20 17:35


63 yo M h/o copd on home O2 3L c/o abd cramping with 3 episodes of bloody stools

today. denies weakness, sob, cp.





O2 sat 92%


tachypneic





A/P: rectal bleeding


labs


main ED





**Discharge Disposition





- Referrals


Referrals: 


Mendez Junior MD [Primary Care Provider] - 





- Patient Instructions





- Post Discharge Activity

## 2020-07-21 NOTE — EKG
Test Reason : 

Blood Pressure : ***/*** mmHG

Vent. Rate : 089 BPM     Atrial Rate : 089 BPM

   P-R Int : 158 ms          QRS Dur : 084 ms

    QT Int : 352 ms       P-R-T Axes : 045 072 067 degrees

   QTc Int : 428 ms

 

NORMAL SINUS RHYTHM

POSSIBLE LEFT ATRIAL ENLARGEMENT

BORDERLINE ECG

WHEN COMPARED WITH ECG OF 15-FEB-2020 13:29,

T WAVE INVERSION NO LONGER EVIDENT IN ANTERIOR LEADS

Confirmed by Ronn Maldonado MD ([id number]) on 7/21/2020 12:05:08 PM

 

Referred By:             Confirmed By:Ronn Maldonado MD

## 2022-02-26 ENCOUNTER — HOSPITAL ENCOUNTER (INPATIENT)
Dept: HOSPITAL 74 - JER | Age: 67
LOS: 10 days | Discharge: NURSING, REHAB OR LONG-TERM CARE | DRG: 190 | End: 2022-03-08
Attending: FAMILY MEDICINE | Admitting: INTERNAL MEDICINE
Payer: COMMERCIAL

## 2022-02-26 VITALS — BODY MASS INDEX: 33.7 KG/M2

## 2022-02-26 DIAGNOSIS — J44.1: Primary | ICD-10-CM

## 2022-02-26 DIAGNOSIS — I11.0: ICD-10-CM

## 2022-02-26 DIAGNOSIS — K40.90: ICD-10-CM

## 2022-02-26 DIAGNOSIS — J96.11: ICD-10-CM

## 2022-02-26 DIAGNOSIS — I50.33: ICD-10-CM

## 2022-02-26 DIAGNOSIS — I25.10: ICD-10-CM

## 2022-02-26 DIAGNOSIS — E78.5: ICD-10-CM

## 2022-02-26 DIAGNOSIS — E05.90: ICD-10-CM

## 2022-02-26 DIAGNOSIS — I27.20: ICD-10-CM

## 2022-02-26 DIAGNOSIS — E66.9: ICD-10-CM

## 2022-02-26 LAB
ALBUMIN SERPL-MCNC: 3.3 G/DL (ref 3.4–5)
ALP SERPL-CCNC: 103 U/L (ref 45–117)
ALT SERPL-CCNC: 26 U/L (ref 13–61)
ANION GAP SERPL CALC-SCNC: 7 MMOL/L (ref 8–16)
AST SERPL-CCNC: 25 U/L (ref 15–37)
BASOPHILS # BLD: 1 % (ref 0–2)
BILIRUB SERPL-MCNC: 0.3 MG/DL (ref 0.2–1)
BNP SERPL-MCNC: 273.6 PG/ML (ref 5–125)
BUN SERPL-MCNC: 10.4 MG/DL (ref 7–18)
CALCIUM SERPL-MCNC: 9.1 MG/DL (ref 8.5–10.1)
CHLORIDE SERPL-SCNC: 104 MMOL/L (ref 98–107)
CO2 SERPL-SCNC: 30 MMOL/L (ref 21–32)
CREAT SERPL-MCNC: 1 MG/DL (ref 0.55–1.3)
DEPRECATED RDW RBC AUTO: 13.9 % (ref 11.9–15.9)
EOSINOPHIL # BLD: 2.3 % (ref 0–4.5)
GLUCOSE SERPL-MCNC: 81 MG/DL (ref 74–106)
HCT VFR BLD CALC: 31.5 % (ref 35.4–49)
HGB BLD-MCNC: 10.1 GM/DL (ref 11.7–16.9)
LYMPHOCYTES # BLD: 15 % (ref 8–40)
MCH RBC QN AUTO: 31.3 PG (ref 25.7–33.7)
MCHC RBC AUTO-ENTMCNC: 32.2 G/DL (ref 32–35.9)
MCV RBC: 97.3 FL (ref 80–96)
MONOCYTES # BLD AUTO: 9 % (ref 3.8–10.2)
NEUTROPHILS # BLD: 72.7 % (ref 42.8–82.8)
PLATELET # BLD AUTO: 344 10^3/UL (ref 134–434)
PMV BLD: 7.1 FL (ref 7.5–11.1)
PROT SERPL-MCNC: 6.8 G/DL (ref 6.4–8.2)
RBC # BLD AUTO: 3.23 M/MM3 (ref 4–5.6)
SODIUM SERPL-SCNC: 141 MMOL/L (ref 136–145)
WBC # BLD AUTO: 8.3 K/MM3 (ref 4–10)

## 2022-02-26 PROCEDURE — U0003 INFECTIOUS AGENT DETECTION BY NUCLEIC ACID (DNA OR RNA); SEVERE ACUTE RESPIRATORY SYNDROME CORONAVIRUS 2 (SARS-COV-2) (CORONAVIRUS DISEASE [COVID-19]), AMPLIFIED PROBE TECHNIQUE, MAKING USE OF HIGH THROUGHPUT TECHNOLOGIES AS DESCRIBED BY CMS-2020-01-R: HCPCS

## 2022-02-26 PROCEDURE — C9803 HOPD COVID-19 SPEC COLLECT: HCPCS

## 2022-02-26 PROCEDURE — U0005 INFEC AGEN DETEC AMPLI PROBE: HCPCS

## 2022-02-26 RX ADMIN — METHYLPREDNISOLONE SODIUM SUCCINATE SCH: 125 INJECTION, POWDER, FOR SOLUTION INTRAMUSCULAR; INTRAVENOUS at 15:02

## 2022-02-26 RX ADMIN — PANTOPRAZOLE SODIUM SCH MG: 20 TABLET, DELAYED RELEASE ORAL at 15:42

## 2022-02-26 RX ADMIN — ATORVASTATIN CALCIUM SCH MG: 80 TABLET, FILM COATED ORAL at 21:21

## 2022-02-26 RX ADMIN — METHYLPREDNISOLONE SODIUM SUCCINATE SCH MG: 125 INJECTION, POWDER, FOR SOLUTION INTRAMUSCULAR; INTRAVENOUS at 20:41

## 2022-02-26 RX ADMIN — ENOXAPARIN SODIUM SCH MG: 40 INJECTION SUBCUTANEOUS at 15:42

## 2022-02-27 LAB
ALBUMIN SERPL-MCNC: 3 G/DL (ref 3.4–5)
ALP SERPL-CCNC: 95 U/L (ref 45–117)
ALT SERPL-CCNC: 22 U/L (ref 13–61)
ANION GAP SERPL CALC-SCNC: 7 MMOL/L (ref 8–16)
AST SERPL-CCNC: 11 U/L (ref 15–37)
BASOPHILS # BLD: 0.1 % (ref 0–2)
BILIRUB SERPL-MCNC: 0.2 MG/DL (ref 0.2–1)
BUN SERPL-MCNC: 13.5 MG/DL (ref 7–18)
CALCIUM SERPL-MCNC: 9.4 MG/DL (ref 8.5–10.1)
CHLORIDE SERPL-SCNC: 101 MMOL/L (ref 98–107)
CO2 SERPL-SCNC: 31 MMOL/L (ref 21–32)
CREAT SERPL-MCNC: 0.7 MG/DL (ref 0.55–1.3)
DEPRECATED RDW RBC AUTO: 13.5 % (ref 11.9–15.9)
EOSINOPHIL # BLD: 0 % (ref 0–4.5)
GLUCOSE SERPL-MCNC: 119 MG/DL (ref 74–106)
HCT VFR BLD CALC: 30.2 % (ref 35.4–49)
HGB BLD-MCNC: 9.7 GM/DL (ref 11.7–16.9)
LYMPHOCYTES # BLD: 9.6 % (ref 8–40)
MAGNESIUM SERPL-MCNC: 1.8 MG/DL (ref 1.8–2.4)
MCH RBC QN AUTO: 31.2 PG (ref 25.7–33.7)
MCHC RBC AUTO-ENTMCNC: 32.1 G/DL (ref 32–35.9)
MCV RBC: 97 FL (ref 80–96)
MONOCYTES # BLD AUTO: 3.4 % (ref 3.8–10.2)
NEUTROPHILS # BLD: 86.9 % (ref 42.8–82.8)
PLATELET # BLD AUTO: 344 10^3/UL (ref 134–434)
PMV BLD: 7.4 FL (ref 7.5–11.1)
PROT SERPL-MCNC: 6.7 G/DL (ref 6.4–8.2)
RBC # BLD AUTO: 3.11 M/MM3 (ref 4–5.6)
SODIUM SERPL-SCNC: 139 MMOL/L (ref 136–145)
WBC # BLD AUTO: 11 K/MM3 (ref 4–10)

## 2022-02-27 RX ADMIN — BENZOCAINE AND MENTHOL PRN EACH: 15; 3.6 LOZENGE ORAL at 04:14

## 2022-02-27 RX ADMIN — FLUTICASONE FUROATE, UMECLIDINIUM BROMIDE AND VILANTEROL TRIFENATATE SCH PUFF: 200; 62.5; 25 POWDER RESPIRATORY (INHALATION) at 13:41

## 2022-02-27 RX ADMIN — ENOXAPARIN SODIUM SCH MG: 40 INJECTION SUBCUTANEOUS at 09:25

## 2022-02-27 RX ADMIN — AZITHROMYCIN SCH MG: 250 TABLET, FILM COATED ORAL at 09:29

## 2022-02-27 RX ADMIN — METHYLPREDNISOLONE SODIUM SUCCINATE SCH MG: 125 INJECTION, POWDER, FOR SOLUTION INTRAMUSCULAR; INTRAVENOUS at 09:24

## 2022-02-27 RX ADMIN — BENZOCAINE AND MENTHOL PRN EACH: 15; 3.6 LOZENGE ORAL at 06:44

## 2022-02-27 RX ADMIN — METHYLPREDNISOLONE SODIUM SUCCINATE SCH MG: 125 INJECTION, POWDER, FOR SOLUTION INTRAMUSCULAR; INTRAVENOUS at 14:21

## 2022-02-27 RX ADMIN — LISINOPRIL SCH MG: 20 TABLET ORAL at 09:29

## 2022-02-27 RX ADMIN — FUROSEMIDE SCH MG: 40 TABLET ORAL at 09:29

## 2022-02-27 RX ADMIN — ATORVASTATIN CALCIUM SCH MG: 80 TABLET, FILM COATED ORAL at 21:04

## 2022-02-27 RX ADMIN — NICOTINE SCH MG: 7 PATCH TRANSDERMAL at 14:18

## 2022-02-27 RX ADMIN — ALBUTEROL SULFATE SCH AMP: 2.5 SOLUTION RESPIRATORY (INHALATION) at 20:25

## 2022-02-27 RX ADMIN — ACETAMINOPHEN PRN MG: 10 INJECTION, SOLUTION INTRAVENOUS at 14:18

## 2022-02-27 RX ADMIN — ALBUTEROL SULFATE SCH AMP: 2.5 SOLUTION RESPIRATORY (INHALATION) at 16:40

## 2022-02-27 RX ADMIN — BENZOCAINE AND MENTHOL PRN EACH: 15; 3.6 LOZENGE ORAL at 21:07

## 2022-02-27 RX ADMIN — METHYLPREDNISOLONE SODIUM SUCCINATE SCH MG: 40 INJECTION, POWDER, FOR SOLUTION INTRAMUSCULAR; INTRAVENOUS at 21:04

## 2022-02-27 RX ADMIN — BENZOCAINE AND MENTHOL PRN EACH: 15; 3.6 LOZENGE ORAL at 14:14

## 2022-02-27 RX ADMIN — SPIRONOLACTONE SCH MG: 25 TABLET, FILM COATED ORAL at 09:29

## 2022-02-27 RX ADMIN — PANTOPRAZOLE SODIUM SCH MG: 20 TABLET, DELAYED RELEASE ORAL at 09:25

## 2022-02-27 RX ADMIN — METHYLPREDNISOLONE SODIUM SUCCINATE SCH MG: 125 INJECTION, POWDER, FOR SOLUTION INTRAMUSCULAR; INTRAVENOUS at 02:30

## 2022-02-28 LAB
ALBUMIN SERPL-MCNC: 2.8 G/DL (ref 3.4–5)
ALP SERPL-CCNC: 95 U/L (ref 45–117)
ALT SERPL-CCNC: 18 U/L (ref 13–61)
ANION GAP SERPL CALC-SCNC: 4 MMOL/L (ref 8–16)
AST SERPL-CCNC: 9 U/L (ref 15–37)
BASOPHILS # BLD: 0.1 % (ref 0–2)
BILIRUB SERPL-MCNC: 0.3 MG/DL (ref 0.2–1)
BUN SERPL-MCNC: 18.7 MG/DL (ref 7–18)
CALCIUM SERPL-MCNC: 8.8 MG/DL (ref 8.5–10.1)
CHLORIDE SERPL-SCNC: 105 MMOL/L (ref 98–107)
CO2 SERPL-SCNC: 33 MMOL/L (ref 21–32)
CREAT SERPL-MCNC: 0.7 MG/DL (ref 0.55–1.3)
DEPRECATED RDW RBC AUTO: 13.5 % (ref 11.9–15.9)
EOSINOPHIL # BLD: 0 % (ref 0–4.5)
GLUCOSE SERPL-MCNC: 148 MG/DL (ref 74–106)
HCT VFR BLD CALC: 29.2 % (ref 35.4–49)
HGB BLD-MCNC: 9.1 GM/DL (ref 11.7–16.9)
IRON SERPL-MCNC: 14 UG/DL (ref 50–175)
LYMPHOCYTES # BLD: 6.1 % (ref 8–40)
MCH RBC QN AUTO: 30.3 PG (ref 25.7–33.7)
MCHC RBC AUTO-ENTMCNC: 31.1 G/DL (ref 32–35.9)
MCV RBC: 97.4 FL (ref 80–96)
MONOCYTES # BLD AUTO: 3.3 % (ref 3.8–10.2)
NEUTROPHILS # BLD: 90.5 % (ref 42.8–82.8)
PLATELET # BLD AUTO: 362 10^3/UL (ref 134–434)
PMV BLD: 7.9 FL (ref 7.5–11.1)
PROT SERPL-MCNC: 6.2 G/DL (ref 6.4–8.2)
RBC # BLD AUTO: 3 M/MM3 (ref 4–5.6)
SODIUM SERPL-SCNC: 142 MMOL/L (ref 136–145)
TIBC SERPL-MCNC: 291 UG/DL (ref 250–450)
WBC # BLD AUTO: 15.9 K/MM3 (ref 4–10)

## 2022-02-28 RX ADMIN — FLUTICASONE FUROATE, UMECLIDINIUM BROMIDE AND VILANTEROL TRIFENATATE SCH PUFF: 200; 62.5; 25 POWDER RESPIRATORY (INHALATION) at 09:12

## 2022-02-28 RX ADMIN — PANTOPRAZOLE SODIUM SCH MG: 20 TABLET, DELAYED RELEASE ORAL at 09:11

## 2022-02-28 RX ADMIN — METHYLPREDNISOLONE SODIUM SUCCINATE SCH MG: 40 INJECTION, POWDER, FOR SOLUTION INTRAMUSCULAR; INTRAVENOUS at 02:16

## 2022-02-28 RX ADMIN — LISINOPRIL SCH MG: 20 TABLET ORAL at 09:11

## 2022-02-28 RX ADMIN — ACETAMINOPHEN PRN MG: 10 INJECTION, SOLUTION INTRAVENOUS at 13:13

## 2022-02-28 RX ADMIN — METHYLPREDNISOLONE SODIUM SUCCINATE SCH MG: 40 INJECTION, POWDER, FOR SOLUTION INTRAMUSCULAR; INTRAVENOUS at 09:12

## 2022-02-28 RX ADMIN — BENZOCAINE AND MENTHOL PRN EACH: 15; 3.6 LOZENGE ORAL at 20:12

## 2022-02-28 RX ADMIN — SPIRONOLACTONE SCH MG: 25 TABLET, FILM COATED ORAL at 09:11

## 2022-02-28 RX ADMIN — ENOXAPARIN SODIUM SCH MG: 40 INJECTION SUBCUTANEOUS at 09:11

## 2022-02-28 RX ADMIN — FUROSEMIDE SCH MG: 40 TABLET ORAL at 09:11

## 2022-02-28 RX ADMIN — METHYLPREDNISOLONE SODIUM SUCCINATE SCH MG: 40 INJECTION, POWDER, FOR SOLUTION INTRAMUSCULAR; INTRAVENOUS at 22:05

## 2022-02-28 RX ADMIN — NICOTINE SCH MG: 7 PATCH TRANSDERMAL at 09:11

## 2022-02-28 RX ADMIN — ALBUTEROL SULFATE SCH AMP: 2.5 SOLUTION RESPIRATORY (INHALATION) at 14:11

## 2022-02-28 RX ADMIN — ALBUTEROL SULFATE SCH AMP: 2.5 SOLUTION RESPIRATORY (INHALATION) at 08:36

## 2022-02-28 RX ADMIN — BENZOCAINE AND MENTHOL PRN EACH: 15; 3.6 LOZENGE ORAL at 01:44

## 2022-02-28 RX ADMIN — ATORVASTATIN CALCIUM SCH MG: 80 TABLET, FILM COATED ORAL at 22:06

## 2022-02-28 RX ADMIN — AZITHROMYCIN SCH MG: 250 TABLET, FILM COATED ORAL at 09:11

## 2022-02-28 RX ADMIN — ALBUTEROL SULFATE SCH AMP: 2.5 SOLUTION RESPIRATORY (INHALATION) at 20:10

## 2022-02-28 RX ADMIN — BENZOCAINE AND MENTHOL PRN EACH: 15; 3.6 LOZENGE ORAL at 13:14

## 2022-03-01 LAB
ALBUMIN SERPL-MCNC: 2.9 G/DL (ref 3.4–5)
ALP SERPL-CCNC: 77 U/L (ref 45–117)
ALT SERPL-CCNC: 17 U/L (ref 13–61)
ANION GAP SERPL CALC-SCNC: 3 MMOL/L (ref 8–16)
AST SERPL-CCNC: 7 U/L (ref 15–37)
BILIRUB SERPL-MCNC: 0.2 MG/DL (ref 0.2–1)
BUN SERPL-MCNC: 19.1 MG/DL (ref 7–18)
CALCIUM SERPL-MCNC: 9.2 MG/DL (ref 8.5–10.1)
CHLORIDE SERPL-SCNC: 103 MMOL/L (ref 98–107)
CO2 SERPL-SCNC: 36 MMOL/L (ref 21–32)
CREAT SERPL-MCNC: 0.7 MG/DL (ref 0.55–1.3)
DEPRECATED RDW RBC AUTO: 13.6 % (ref 11.9–15.9)
GLUCOSE SERPL-MCNC: 114 MG/DL (ref 74–106)
HCT VFR BLD CALC: 31.3 % (ref 35.4–49)
HGB BLD-MCNC: 9.7 GM/DL (ref 11.7–16.9)
MCH RBC QN AUTO: 30.4 PG (ref 25.7–33.7)
MCHC RBC AUTO-ENTMCNC: 30.8 G/DL (ref 32–35.9)
MCV RBC: 98.5 FL (ref 80–96)
PLATELET # BLD AUTO: 369 10^3/UL (ref 134–434)
PMV BLD: 7.9 FL (ref 7.5–11.1)
PROT SERPL-MCNC: 6.4 G/DL (ref 6.4–8.2)
RBC # BLD AUTO: 3.18 M/MM3 (ref 4–5.6)
SODIUM SERPL-SCNC: 142 MMOL/L (ref 136–145)
WBC # BLD AUTO: 17.3 K/MM3 (ref 4–10)

## 2022-03-01 RX ADMIN — SPIRONOLACTONE SCH MG: 25 TABLET, FILM COATED ORAL at 09:12

## 2022-03-01 RX ADMIN — METHYLPREDNISOLONE SODIUM SUCCINATE SCH MG: 40 INJECTION, POWDER, FOR SOLUTION INTRAMUSCULAR; INTRAVENOUS at 09:13

## 2022-03-01 RX ADMIN — ALBUTEROL SULFATE SCH AMP: 2.5 SOLUTION RESPIRATORY (INHALATION) at 15:00

## 2022-03-01 RX ADMIN — ALBUTEROL SULFATE SCH AMP: 2.5 SOLUTION RESPIRATORY (INHALATION) at 08:50

## 2022-03-01 RX ADMIN — ENOXAPARIN SODIUM SCH MG: 40 INJECTION SUBCUTANEOUS at 09:13

## 2022-03-01 RX ADMIN — ATORVASTATIN CALCIUM SCH MG: 80 TABLET, FILM COATED ORAL at 21:27

## 2022-03-01 RX ADMIN — PANTOPRAZOLE SODIUM SCH MG: 40 TABLET, DELAYED RELEASE ORAL at 09:13

## 2022-03-01 RX ADMIN — FUROSEMIDE SCH MG: 40 TABLET ORAL at 09:12

## 2022-03-01 RX ADMIN — BENZOCAINE AND MENTHOL PRN EACH: 15; 3.6 LOZENGE ORAL at 10:41

## 2022-03-01 RX ADMIN — AZITHROMYCIN SCH MG: 250 TABLET, FILM COATED ORAL at 09:12

## 2022-03-01 RX ADMIN — FLUTICASONE FUROATE, UMECLIDINIUM BROMIDE AND VILANTEROL TRIFENATATE SCH PUFF: 200; 62.5; 25 POWDER RESPIRATORY (INHALATION) at 09:14

## 2022-03-01 RX ADMIN — BENZOCAINE AND MENTHOL PRN EACH: 15; 3.6 LOZENGE ORAL at 20:00

## 2022-03-01 RX ADMIN — NICOTINE SCH MG: 7 PATCH TRANSDERMAL at 09:13

## 2022-03-01 RX ADMIN — METHYLPREDNISOLONE SODIUM SUCCINATE SCH MG: 40 INJECTION, POWDER, FOR SOLUTION INTRAMUSCULAR; INTRAVENOUS at 21:28

## 2022-03-01 RX ADMIN — ALBUTEROL SULFATE SCH AMP: 2.5 SOLUTION RESPIRATORY (INHALATION) at 20:17

## 2022-03-01 RX ADMIN — LISINOPRIL SCH MG: 20 TABLET ORAL at 09:12

## 2022-03-02 RX ADMIN — PANTOPRAZOLE SODIUM SCH MG: 40 TABLET, DELAYED RELEASE ORAL at 09:18

## 2022-03-02 RX ADMIN — SPIRONOLACTONE SCH MG: 25 TABLET, FILM COATED ORAL at 09:18

## 2022-03-02 RX ADMIN — BENZOCAINE AND MENTHOL PRN EACH: 15; 3.6 LOZENGE ORAL at 10:04

## 2022-03-02 RX ADMIN — METHYLPREDNISOLONE SODIUM SUCCINATE SCH MG: 40 INJECTION, POWDER, FOR SOLUTION INTRAMUSCULAR; INTRAVENOUS at 09:18

## 2022-03-02 RX ADMIN — ATORVASTATIN CALCIUM SCH MG: 80 TABLET, FILM COATED ORAL at 21:24

## 2022-03-02 RX ADMIN — NICOTINE SCH MG: 7 PATCH TRANSDERMAL at 09:19

## 2022-03-02 RX ADMIN — BENZOCAINE AND MENTHOL PRN EACH: 15; 3.6 LOZENGE ORAL at 02:27

## 2022-03-02 RX ADMIN — FLUTICASONE FUROATE, UMECLIDINIUM BROMIDE AND VILANTEROL TRIFENATATE SCH PUFF: 200; 62.5; 25 POWDER RESPIRATORY (INHALATION) at 10:00

## 2022-03-02 RX ADMIN — LISINOPRIL SCH MG: 20 TABLET ORAL at 09:18

## 2022-03-02 RX ADMIN — FUROSEMIDE SCH MG: 40 TABLET ORAL at 09:18

## 2022-03-02 RX ADMIN — ALBUTEROL SULFATE SCH AMP: 2.5 SOLUTION RESPIRATORY (INHALATION) at 20:41

## 2022-03-02 RX ADMIN — ALBUTEROL SULFATE SCH AMP: 2.5 SOLUTION RESPIRATORY (INHALATION) at 14:49

## 2022-03-02 RX ADMIN — ENOXAPARIN SODIUM SCH MG: 40 INJECTION SUBCUTANEOUS at 09:25

## 2022-03-02 RX ADMIN — METHYLPREDNISOLONE SODIUM SUCCINATE SCH MG: 40 INJECTION, POWDER, FOR SOLUTION INTRAMUSCULAR; INTRAVENOUS at 21:24

## 2022-03-02 RX ADMIN — BENZOCAINE AND MENTHOL PRN EACH: 15; 3.6 LOZENGE ORAL at 20:10

## 2022-03-02 RX ADMIN — ALBUTEROL SULFATE SCH AMP: 2.5 SOLUTION RESPIRATORY (INHALATION) at 08:06

## 2022-03-03 RX ADMIN — PANTOPRAZOLE SODIUM SCH MG: 40 TABLET, DELAYED RELEASE ORAL at 09:41

## 2022-03-03 RX ADMIN — PREDNISONE SCH MG: 20 TABLET ORAL at 21:43

## 2022-03-03 RX ADMIN — ENOXAPARIN SODIUM SCH MG: 40 INJECTION SUBCUTANEOUS at 09:42

## 2022-03-03 RX ADMIN — FLUTICASONE FUROATE, UMECLIDINIUM BROMIDE AND VILANTEROL TRIFENATATE SCH PUFF: 200; 62.5; 25 POWDER RESPIRATORY (INHALATION) at 09:43

## 2022-03-03 RX ADMIN — SPIRONOLACTONE SCH MG: 25 TABLET, FILM COATED ORAL at 09:42

## 2022-03-03 RX ADMIN — ALBUTEROL SULFATE SCH AMP: 2.5 SOLUTION RESPIRATORY (INHALATION) at 20:21

## 2022-03-03 RX ADMIN — ATORVASTATIN CALCIUM SCH MG: 80 TABLET, FILM COATED ORAL at 21:43

## 2022-03-03 RX ADMIN — ALBUTEROL SULFATE SCH AMP: 2.5 SOLUTION RESPIRATORY (INHALATION) at 08:38

## 2022-03-03 RX ADMIN — BENZOCAINE AND MENTHOL PRN EACH: 15; 3.6 LOZENGE ORAL at 09:43

## 2022-03-03 RX ADMIN — METHYLPREDNISOLONE SODIUM SUCCINATE SCH MG: 40 INJECTION, POWDER, FOR SOLUTION INTRAMUSCULAR; INTRAVENOUS at 09:42

## 2022-03-03 RX ADMIN — ALBUTEROL SULFATE SCH AMP: 2.5 SOLUTION RESPIRATORY (INHALATION) at 15:15

## 2022-03-03 RX ADMIN — LISINOPRIL SCH MG: 20 TABLET ORAL at 09:42

## 2022-03-03 RX ADMIN — BENZOCAINE AND MENTHOL PRN EACH: 15; 3.6 LOZENGE ORAL at 04:43

## 2022-03-03 RX ADMIN — BENZOCAINE AND MENTHOL PRN EACH: 15; 3.6 LOZENGE ORAL at 18:43

## 2022-03-03 RX ADMIN — FUROSEMIDE SCH MG: 40 TABLET ORAL at 09:42

## 2022-03-03 RX ADMIN — NICOTINE SCH MG: 7 PATCH TRANSDERMAL at 09:42

## 2022-03-04 RX ADMIN — ATORVASTATIN CALCIUM SCH MG: 80 TABLET, FILM COATED ORAL at 21:30

## 2022-03-04 RX ADMIN — ACETAMINOPHEN PRN MG: 500 TABLET, FILM COATED ORAL at 22:34

## 2022-03-04 RX ADMIN — ALBUTEROL SULFATE SCH AMP: 2.5 SOLUTION RESPIRATORY (INHALATION) at 08:50

## 2022-03-04 RX ADMIN — NICOTINE SCH MG: 7 PATCH TRANSDERMAL at 09:26

## 2022-03-04 RX ADMIN — FUROSEMIDE SCH MG: 40 TABLET ORAL at 09:26

## 2022-03-04 RX ADMIN — ENOXAPARIN SODIUM SCH MG: 40 INJECTION SUBCUTANEOUS at 09:26

## 2022-03-04 RX ADMIN — FLUTICASONE FUROATE, UMECLIDINIUM BROMIDE AND VILANTEROL TRIFENATATE SCH PUFF: 200; 62.5; 25 POWDER RESPIRATORY (INHALATION) at 09:27

## 2022-03-04 RX ADMIN — SPIRONOLACTONE SCH MG: 25 TABLET, FILM COATED ORAL at 09:26

## 2022-03-04 RX ADMIN — PANTOPRAZOLE SODIUM SCH MG: 40 TABLET, DELAYED RELEASE ORAL at 09:26

## 2022-03-04 RX ADMIN — PREDNISONE SCH MG: 20 TABLET ORAL at 21:30

## 2022-03-04 RX ADMIN — LISINOPRIL SCH MG: 20 TABLET ORAL at 09:26

## 2022-03-04 RX ADMIN — BENZOCAINE AND MENTHOL PRN EACH: 15; 3.6 LOZENGE ORAL at 22:34

## 2022-03-04 RX ADMIN — PREDNISONE SCH MG: 20 TABLET ORAL at 09:26

## 2022-03-05 RX ADMIN — SPIRONOLACTONE SCH MG: 25 TABLET, FILM COATED ORAL at 09:25

## 2022-03-05 RX ADMIN — BENZOCAINE AND MENTHOL PRN EACH: 15; 3.6 LOZENGE ORAL at 02:59

## 2022-03-05 RX ADMIN — PREDNISONE SCH MG: 20 TABLET ORAL at 09:25

## 2022-03-05 RX ADMIN — BENZOCAINE AND MENTHOL PRN EACH: 15; 3.6 LOZENGE ORAL at 09:36

## 2022-03-05 RX ADMIN — NICOTINE SCH MG: 7 PATCH TRANSDERMAL at 09:25

## 2022-03-05 RX ADMIN — PANTOPRAZOLE SODIUM SCH MG: 40 TABLET, DELAYED RELEASE ORAL at 09:25

## 2022-03-05 RX ADMIN — FUROSEMIDE SCH MG: 40 TABLET ORAL at 09:25

## 2022-03-05 RX ADMIN — LISINOPRIL SCH MG: 20 TABLET ORAL at 09:25

## 2022-03-05 RX ADMIN — ACETAMINOPHEN PRN MG: 500 TABLET, FILM COATED ORAL at 19:35

## 2022-03-05 RX ADMIN — FLUTICASONE FUROATE, UMECLIDINIUM BROMIDE AND VILANTEROL TRIFENATATE SCH PUFF: 200; 62.5; 25 POWDER RESPIRATORY (INHALATION) at 09:26

## 2022-03-05 RX ADMIN — ENOXAPARIN SODIUM SCH MG: 40 INJECTION SUBCUTANEOUS at 09:26

## 2022-03-05 RX ADMIN — PREDNISONE SCH MG: 20 TABLET ORAL at 21:11

## 2022-03-05 RX ADMIN — ACETAMINOPHEN PRN MG: 500 TABLET, FILM COATED ORAL at 09:35

## 2022-03-05 RX ADMIN — IPRATROPIUM BROMIDE AND ALBUTEROL SULFATE PRN AMP: .5; 3 SOLUTION RESPIRATORY (INHALATION) at 21:04

## 2022-03-05 RX ADMIN — ATORVASTATIN CALCIUM SCH MG: 80 TABLET, FILM COATED ORAL at 21:11

## 2022-03-06 RX ADMIN — PANTOPRAZOLE SODIUM SCH MG: 40 TABLET, DELAYED RELEASE ORAL at 09:55

## 2022-03-06 RX ADMIN — LISINOPRIL SCH MG: 20 TABLET ORAL at 09:56

## 2022-03-06 RX ADMIN — ATORVASTATIN CALCIUM SCH MG: 80 TABLET, FILM COATED ORAL at 21:24

## 2022-03-06 RX ADMIN — IPRATROPIUM BROMIDE AND ALBUTEROL SULFATE PRN AMP: .5; 3 SOLUTION RESPIRATORY (INHALATION) at 14:25

## 2022-03-06 RX ADMIN — FUROSEMIDE SCH MG: 40 TABLET ORAL at 09:56

## 2022-03-06 RX ADMIN — BENZOCAINE AND MENTHOL PRN EACH: 15; 3.6 LOZENGE ORAL at 05:01

## 2022-03-06 RX ADMIN — NICOTINE SCH MG: 7 PATCH TRANSDERMAL at 09:55

## 2022-03-06 RX ADMIN — IPRATROPIUM BROMIDE AND ALBUTEROL SULFATE PRN AMP: .5; 3 SOLUTION RESPIRATORY (INHALATION) at 07:42

## 2022-03-06 RX ADMIN — PREDNISONE SCH MG: 5 TABLET ORAL at 21:23

## 2022-03-06 RX ADMIN — IPRATROPIUM BROMIDE AND ALBUTEROL SULFATE PRN AMP: .5; 3 SOLUTION RESPIRATORY (INHALATION) at 20:10

## 2022-03-06 RX ADMIN — FLUTICASONE FUROATE, UMECLIDINIUM BROMIDE AND VILANTEROL TRIFENATATE SCH PUFF: 200; 62.5; 25 POWDER RESPIRATORY (INHALATION) at 09:59

## 2022-03-06 RX ADMIN — SPIRONOLACTONE SCH MG: 25 TABLET, FILM COATED ORAL at 09:56

## 2022-03-06 RX ADMIN — PREDNISONE SCH MG: 20 TABLET ORAL at 09:55

## 2022-03-07 RX ADMIN — IPRATROPIUM BROMIDE AND ALBUTEROL SULFATE SCH: .5; 3 SOLUTION RESPIRATORY (INHALATION) at 13:26

## 2022-03-07 RX ADMIN — IPRATROPIUM BROMIDE AND ALBUTEROL SULFATE SCH AMP: .5; 3 SOLUTION RESPIRATORY (INHALATION) at 15:25

## 2022-03-07 RX ADMIN — FLUTICASONE FUROATE, UMECLIDINIUM BROMIDE AND VILANTEROL TRIFENATATE SCH PUFF: 200; 62.5; 25 POWDER RESPIRATORY (INHALATION) at 09:44

## 2022-03-07 RX ADMIN — FUROSEMIDE SCH MG: 40 TABLET ORAL at 09:39

## 2022-03-07 RX ADMIN — IPRATROPIUM BROMIDE AND ALBUTEROL SULFATE SCH AMP: .5; 3 SOLUTION RESPIRATORY (INHALATION) at 20:00

## 2022-03-07 RX ADMIN — NICOTINE SCH MG: 7 PATCH TRANSDERMAL at 09:39

## 2022-03-07 RX ADMIN — PANTOPRAZOLE SODIUM SCH MG: 40 TABLET, DELAYED RELEASE ORAL at 09:39

## 2022-03-07 RX ADMIN — PREDNISONE SCH MG: 5 TABLET ORAL at 21:43

## 2022-03-07 RX ADMIN — PREDNISONE SCH MG: 5 TABLET ORAL at 09:39

## 2022-03-07 RX ADMIN — LISINOPRIL SCH MG: 20 TABLET ORAL at 09:39

## 2022-03-07 RX ADMIN — SPIRONOLACTONE SCH MG: 25 TABLET, FILM COATED ORAL at 09:39

## 2022-03-07 RX ADMIN — ATORVASTATIN CALCIUM SCH MG: 80 TABLET, FILM COATED ORAL at 21:43

## 2022-03-08 VITALS — DIASTOLIC BLOOD PRESSURE: 77 MMHG | SYSTOLIC BLOOD PRESSURE: 132 MMHG | HEART RATE: 94 BPM | TEMPERATURE: 98.2 F

## 2022-03-08 LAB
ALBUMIN SERPL-MCNC: 3.4 G/DL (ref 3.4–5)
ALP SERPL-CCNC: 90 U/L (ref 45–117)
ALT SERPL-CCNC: 30 U/L (ref 13–61)
ANION GAP SERPL CALC-SCNC: 4 MMOL/L (ref 8–16)
AST SERPL-CCNC: 10 U/L (ref 15–37)
BILIRUB SERPL-MCNC: 0.2 MG/DL (ref 0.2–1)
BUN SERPL-MCNC: 22.5 MG/DL (ref 7–18)
CALCIUM SERPL-MCNC: 9.4 MG/DL (ref 8.5–10.1)
CHLORIDE SERPL-SCNC: 93 MMOL/L (ref 98–107)
CO2 SERPL-SCNC: 35 MMOL/L (ref 21–32)
CREAT SERPL-MCNC: 0.9 MG/DL (ref 0.55–1.3)
DEPRECATED RDW RBC AUTO: 13.6 % (ref 11.9–15.9)
GLUCOSE SERPL-MCNC: 89 MG/DL (ref 74–106)
HCT VFR BLD CALC: 33.6 % (ref 35.4–49)
HGB BLD-MCNC: 10.3 GM/DL (ref 11.7–16.9)
MCH RBC QN AUTO: 29.8 PG (ref 25.7–33.7)
MCHC RBC AUTO-ENTMCNC: 30.8 G/DL (ref 32–35.9)
MCV RBC: 96.6 FL (ref 80–96)
PLATELET # BLD AUTO: 420 10^3/UL (ref 134–434)
PMV BLD: 8.5 FL (ref 7.5–11.1)
PROT SERPL-MCNC: 6.9 G/DL (ref 6.4–8.2)
RBC # BLD AUTO: 3.47 M/MM3 (ref 4–5.6)
SODIUM SERPL-SCNC: 132 MMOL/L (ref 136–145)
WBC # BLD AUTO: 21.6 K/MM3 (ref 4–10)

## 2022-03-08 RX ADMIN — LISINOPRIL SCH MG: 20 TABLET ORAL at 10:00

## 2022-03-08 RX ADMIN — SPIRONOLACTONE SCH MG: 25 TABLET, FILM COATED ORAL at 09:59

## 2022-03-08 RX ADMIN — IPRATROPIUM BROMIDE AND ALBUTEROL SULFATE SCH AMP: .5; 3 SOLUTION RESPIRATORY (INHALATION) at 11:27

## 2022-03-08 RX ADMIN — NICOTINE SCH MG: 7 PATCH TRANSDERMAL at 09:59

## 2022-03-08 RX ADMIN — FLUTICASONE FUROATE, UMECLIDINIUM BROMIDE AND VILANTEROL TRIFENATATE SCH PUFF: 200; 62.5; 25 POWDER RESPIRATORY (INHALATION) at 10:02

## 2022-03-08 RX ADMIN — ACETAMINOPHEN PRN MG: 500 TABLET, FILM COATED ORAL at 10:20

## 2022-03-08 RX ADMIN — PANTOPRAZOLE SODIUM SCH MG: 40 TABLET, DELAYED RELEASE ORAL at 10:00

## 2022-03-08 RX ADMIN — IPRATROPIUM BROMIDE AND ALBUTEROL SULFATE SCH AMP: .5; 3 SOLUTION RESPIRATORY (INHALATION) at 15:13

## 2022-03-08 RX ADMIN — IPRATROPIUM BROMIDE AND ALBUTEROL SULFATE SCH AMP: .5; 3 SOLUTION RESPIRATORY (INHALATION) at 07:41

## 2022-03-08 RX ADMIN — PREDNISONE SCH MG: 5 TABLET ORAL at 09:59

## 2022-03-08 RX ADMIN — IPRATROPIUM BROMIDE AND ALBUTEROL SULFATE SCH AMP: .5; 3 SOLUTION RESPIRATORY (INHALATION) at 20:10

## 2022-03-08 RX ADMIN — FUROSEMIDE SCH MG: 40 TABLET ORAL at 09:59

## 2023-08-29 ENCOUNTER — HOSPITAL ENCOUNTER (INPATIENT)
Dept: HOSPITAL 74 - JER | Age: 68
LOS: 10 days | Discharge: HOME | DRG: 190 | End: 2023-09-08
Attending: STUDENT IN AN ORGANIZED HEALTH CARE EDUCATION/TRAINING PROGRAM | Admitting: STUDENT IN AN ORGANIZED HEALTH CARE EDUCATION/TRAINING PROGRAM
Payer: COMMERCIAL

## 2023-08-29 VITALS — BODY MASS INDEX: 20.2 KG/M2

## 2023-08-29 DIAGNOSIS — Z99.81: ICD-10-CM

## 2023-08-29 DIAGNOSIS — I25.10: ICD-10-CM

## 2023-08-29 DIAGNOSIS — J44.1: Primary | ICD-10-CM

## 2023-08-29 DIAGNOSIS — G47.33: ICD-10-CM

## 2023-08-29 DIAGNOSIS — I11.0: ICD-10-CM

## 2023-08-29 DIAGNOSIS — I50.9: ICD-10-CM

## 2023-08-29 DIAGNOSIS — K40.90: ICD-10-CM

## 2023-08-29 DIAGNOSIS — J96.21: ICD-10-CM

## 2023-08-29 LAB
ALBUMIN SERPL-MCNC: 4.1 G/DL (ref 3.4–5)
ALP SERPL-CCNC: 94 U/L (ref 45–117)
ALT SERPL-CCNC: 19 U/L (ref 13–61)
ANION GAP SERPL CALC-SCNC: 11 MMOL/L (ref 8–16)
APTT BLD: 30.7 SECONDS (ref 25.2–36.5)
AST SERPL-CCNC: 15 U/L (ref 15–37)
BASE EXCESS BLDV CALC-SCNC: -0.3 MMOL/L (ref -2–2)
BASOPHILS # BLD: 0.9 % (ref 0–2)
BILIRUB SERPL-MCNC: 0.3 MG/DL (ref 0.2–1)
BNP SERPL-MCNC: 53.8 PG/ML (ref 5–125)
BUN SERPL-MCNC: 9 MG/DL (ref 7–18)
CALCIUM SERPL-MCNC: 9.5 MG/DL (ref 8.5–10.1)
CHLORIDE SERPL-SCNC: 99 MMOL/L (ref 98–107)
CO2 SERPL-SCNC: 26 MMOL/L (ref 21–32)
CREAT SERPL-MCNC: 0.8 MG/DL (ref 0.55–1.3)
DEPRECATED RDW RBC AUTO: 16.4 % (ref 11.9–15.9)
EOSINOPHIL # BLD: 1.1 % (ref 0–4.5)
GLUCOSE SERPL-MCNC: 83 MG/DL (ref 74–106)
HCT VFR BLD CALC: 42.6 % (ref 35.4–49)
HCT VFR BLDV CALC: 39 % (ref 35.4–49)
HGB BLD-MCNC: 13.2 GM/DL (ref 11.7–16.9)
INR BLD: 1.09 (ref 0.83–1.09)
LACTATE SERPL-MCNC: 3 MMOL/L (ref 0.4–2)
LIPASE SERPL-CCNC: 69 U/L (ref 73–393)
LYMPHOCYTES # BLD: 13 % (ref 8–40)
MAGNESIUM SERPL-MCNC: 1.9 MG/DL (ref 1.8–2.4)
MCH RBC QN AUTO: 26.9 PG (ref 25.7–33.7)
MCHC RBC AUTO-ENTMCNC: 31 G/DL (ref 32–35.9)
MCV RBC: 86.9 FL (ref 80–96)
MONOCYTES # BLD AUTO: 6.1 % (ref 3.8–10.2)
NEUTROPHILS # BLD: 78.9 % (ref 42.8–82.8)
PCO2 BLDV: 60.8 MMHG (ref 38–52)
PH BLDV: 7.28 [PH] (ref 7.31–7.41)
PHOSPHATE SERPL-MCNC: 3.7 MG/DL (ref 2.5–4.9)
PLATELET # BLD AUTO: 407 10^3/UL (ref 134–434)
PMV BLD: 8.2 FL (ref 7.5–11.1)
POTASSIUM SERPLBLD-SCNC: 4.1 MMOL/L (ref 3.5–5.1)
PROT SERPL-MCNC: 9.2 G/DL (ref 6.4–8.2)
PT PNL PPP: 12.6 SEC (ref 9.7–13)
RBC # BLD AUTO: 4.9 M/MM3 (ref 4–5.6)
SAO2 % BLDV: 27.4 % (ref 70–80)
SODIUM SERPL-SCNC: 136 MMOL/L (ref 136–145)
WBC # BLD AUTO: 11.1 K/MM3 (ref 4–10)

## 2023-08-29 RX ADMIN — IPRATROPIUM BROMIDE AND ALBUTEROL SULFATE SCH AMP: .5; 3 SOLUTION RESPIRATORY (INHALATION) at 20:05

## 2023-08-29 RX ADMIN — IPRATROPIUM BROMIDE AND ALBUTEROL SULFATE SCH AMP: .5; 3 SOLUTION RESPIRATORY (INHALATION) at 15:15

## 2023-08-29 RX ADMIN — METHYLPREDNISOLONE SODIUM SUCCINATE SCH MG: 40 INJECTION, POWDER, FOR SOLUTION INTRAMUSCULAR; INTRAVENOUS at 15:15

## 2023-08-29 RX ADMIN — BUDESONIDE AND FORMOTEROL FUMARATE DIHYDRATE SCH PUFF: 160; 4.5 AEROSOL RESPIRATORY (INHALATION) at 23:15

## 2023-08-29 RX ADMIN — IPRATROPIUM BROMIDE AND ALBUTEROL SULFATE SCH AMP: .5; 3 SOLUTION RESPIRATORY (INHALATION) at 06:39

## 2023-08-29 RX ADMIN — IPRATROPIUM BROMIDE AND ALBUTEROL SULFATE SCH AMP: .5; 3 SOLUTION RESPIRATORY (INHALATION) at 06:37

## 2023-08-29 RX ADMIN — SODIUM CHLORIDE, POTASSIUM CHLORIDE, SODIUM LACTATE AND CALCIUM CHLORIDE SCH MLS/HR: 600; 310; 30; 20 INJECTION, SOLUTION INTRAVENOUS at 14:35

## 2023-08-29 RX ADMIN — IPRATROPIUM BROMIDE AND ALBUTEROL SULFATE SCH AMP: .5; 3 SOLUTION RESPIRATORY (INHALATION) at 06:22

## 2023-08-29 RX ADMIN — IPRATROPIUM BROMIDE AND ALBUTEROL SULFATE SCH AMP: .5; 3 SOLUTION RESPIRATORY (INHALATION) at 06:33

## 2023-08-30 LAB
ALBUMIN SERPL-MCNC: 3 G/DL (ref 3.4–5)
ALP SERPL-CCNC: 76 U/L (ref 45–117)
ALT SERPL-CCNC: 17 U/L (ref 13–61)
ANION GAP SERPL CALC-SCNC: 6 MMOL/L (ref 8–16)
AST SERPL-CCNC: 12 U/L (ref 15–37)
BASOPHILS # BLD: 0.2 % (ref 0–2)
BILIRUB SERPL-MCNC: 0.2 MG/DL (ref 0.2–1)
BUN SERPL-MCNC: 18.2 MG/DL (ref 7–18)
CALCIUM SERPL-MCNC: 9 MG/DL (ref 8.5–10.1)
CHLORIDE SERPL-SCNC: 104 MMOL/L (ref 98–107)
CO2 SERPL-SCNC: 31 MMOL/L (ref 21–32)
CREAT SERPL-MCNC: 0.7 MG/DL (ref 0.55–1.3)
DEPRECATED RDW RBC AUTO: 16.3 % (ref 11.9–15.9)
EOSINOPHIL # BLD: 0.2 % (ref 0–4.5)
GLUCOSE SERPL-MCNC: 142 MG/DL (ref 74–106)
HCT VFR BLD CALC: 34.7 % (ref 35.4–49)
HGB BLD-MCNC: 10.7 GM/DL (ref 11.7–16.9)
LYMPHOCYTES # BLD: 17.1 % (ref 8–40)
MAGNESIUM SERPL-MCNC: 1.9 MG/DL (ref 1.8–2.4)
MCH RBC QN AUTO: 26.7 PG (ref 25.7–33.7)
MCHC RBC AUTO-ENTMCNC: 30.9 G/DL (ref 32–35.9)
MCV RBC: 86.4 FL (ref 80–96)
MONOCYTES # BLD AUTO: 6.3 % (ref 3.8–10.2)
NEUTROPHILS # BLD: 76.2 % (ref 42.8–82.8)
PHOSPHATE SERPL-MCNC: 3 MG/DL (ref 2.5–4.9)
PLATELET # BLD AUTO: 327 10^3/UL (ref 134–434)
PMV BLD: 8.2 FL (ref 7.5–11.1)
POTASSIUM SERPLBLD-SCNC: 4.1 MMOL/L (ref 3.5–5.1)
PROT SERPL-MCNC: 7.1 G/DL (ref 6.4–8.2)
RBC # BLD AUTO: 4.02 M/MM3 (ref 4–5.6)
SODIUM SERPL-SCNC: 142 MMOL/L (ref 136–145)
WBC # BLD AUTO: 9.9 K/MM3 (ref 4–10)

## 2023-08-30 RX ADMIN — BUDESONIDE AND FORMOTEROL FUMARATE DIHYDRATE SCH PUFF: 160; 4.5 AEROSOL RESPIRATORY (INHALATION) at 09:19

## 2023-08-30 RX ADMIN — IPRATROPIUM BROMIDE AND ALBUTEROL SULFATE SCH AMP: .5; 3 SOLUTION RESPIRATORY (INHALATION) at 07:35

## 2023-08-30 RX ADMIN — SODIUM CHLORIDE, POTASSIUM CHLORIDE, SODIUM LACTATE AND CALCIUM CHLORIDE SCH: 600; 310; 30; 20 INJECTION, SOLUTION INTRAVENOUS at 19:10

## 2023-08-30 RX ADMIN — BUDESONIDE AND FORMOTEROL FUMARATE DIHYDRATE SCH PUFF: 160; 4.5 AEROSOL RESPIRATORY (INHALATION) at 21:21

## 2023-08-30 RX ADMIN — ATORVASTATIN CALCIUM SCH MG: 80 TABLET, FILM COATED ORAL at 21:20

## 2023-08-30 RX ADMIN — IPRATROPIUM BROMIDE AND ALBUTEROL SULFATE SCH AMP: .5; 3 SOLUTION RESPIRATORY (INHALATION) at 11:40

## 2023-08-30 RX ADMIN — IPRATROPIUM BROMIDE AND ALBUTEROL SULFATE SCH AMP: .5; 3 SOLUTION RESPIRATORY (INHALATION) at 15:09

## 2023-08-30 RX ADMIN — IPRATROPIUM BROMIDE AND ALBUTEROL SULFATE SCH AMP: .5; 3 SOLUTION RESPIRATORY (INHALATION) at 20:30

## 2023-08-30 RX ADMIN — METHYLPREDNISOLONE SODIUM SUCCINATE SCH MG: 40 INJECTION, POWDER, FOR SOLUTION INTRAMUSCULAR; INTRAVENOUS at 09:19

## 2023-08-31 RX ADMIN — BUDESONIDE AND FORMOTEROL FUMARATE DIHYDRATE SCH PUFF: 160; 4.5 AEROSOL RESPIRATORY (INHALATION) at 21:22

## 2023-08-31 RX ADMIN — LISINOPRIL SCH MG: 20 TABLET ORAL at 10:18

## 2023-08-31 RX ADMIN — SPIRONOLACTONE SCH MG: 25 TABLET, FILM COATED ORAL at 10:17

## 2023-08-31 RX ADMIN — FUROSEMIDE SCH MG: 40 TABLET ORAL at 10:18

## 2023-08-31 RX ADMIN — ACETAMINOPHEN PRN MG: 325 TABLET ORAL at 10:18

## 2023-08-31 RX ADMIN — METHYLPREDNISOLONE SODIUM SUCCINATE SCH MG: 40 INJECTION, POWDER, FOR SOLUTION INTRAMUSCULAR; INTRAVENOUS at 10:19

## 2023-08-31 RX ADMIN — ACETAMINOPHEN PRN MG: 325 TABLET ORAL at 02:11

## 2023-08-31 RX ADMIN — METHYLPREDNISOLONE SODIUM SUCCINATE SCH MG: 40 INJECTION, POWDER, FOR SOLUTION INTRAMUSCULAR; INTRAVENOUS at 18:13

## 2023-08-31 RX ADMIN — IPRATROPIUM BROMIDE AND ALBUTEROL SULFATE SCH AMP: .5; 3 SOLUTION RESPIRATORY (INHALATION) at 16:00

## 2023-08-31 RX ADMIN — SODIUM CHLORIDE, POTASSIUM CHLORIDE, SODIUM LACTATE AND CALCIUM CHLORIDE SCH: 600; 310; 30; 20 INJECTION, SOLUTION INTRAVENOUS at 21:20

## 2023-08-31 RX ADMIN — IPRATROPIUM BROMIDE AND ALBUTEROL SULFATE SCH AMP: .5; 3 SOLUTION RESPIRATORY (INHALATION) at 20:00

## 2023-08-31 RX ADMIN — ACETAMINOPHEN PRN MG: 325 TABLET ORAL at 21:21

## 2023-08-31 RX ADMIN — BUDESONIDE AND FORMOTEROL FUMARATE DIHYDRATE SCH PUFF: 160; 4.5 AEROSOL RESPIRATORY (INHALATION) at 10:51

## 2023-08-31 RX ADMIN — IPRATROPIUM BROMIDE AND ALBUTEROL SULFATE SCH AMP: .5; 3 SOLUTION RESPIRATORY (INHALATION) at 07:25

## 2023-08-31 RX ADMIN — ATORVASTATIN CALCIUM SCH MG: 80 TABLET, FILM COATED ORAL at 21:21

## 2023-08-31 RX ADMIN — IPRATROPIUM BROMIDE AND ALBUTEROL SULFATE SCH AMP: .5; 3 SOLUTION RESPIRATORY (INHALATION) at 11:45

## 2023-09-01 RX ADMIN — IPRATROPIUM BROMIDE AND ALBUTEROL SULFATE SCH AMP: .5; 3 SOLUTION RESPIRATORY (INHALATION) at 15:21

## 2023-09-01 RX ADMIN — BUDESONIDE AND FORMOTEROL FUMARATE DIHYDRATE SCH PUFF: 160; 4.5 AEROSOL RESPIRATORY (INHALATION) at 21:17

## 2023-09-01 RX ADMIN — METHYLPREDNISOLONE SODIUM SUCCINATE SCH MG: 40 INJECTION, POWDER, FOR SOLUTION INTRAMUSCULAR; INTRAVENOUS at 11:58

## 2023-09-01 RX ADMIN — FUROSEMIDE SCH MG: 40 TABLET ORAL at 11:58

## 2023-09-01 RX ADMIN — ACETAMINOPHEN PRN MG: 325 TABLET ORAL at 13:08

## 2023-09-01 RX ADMIN — IPRATROPIUM BROMIDE AND ALBUTEROL SULFATE SCH AMP: .5; 3 SOLUTION RESPIRATORY (INHALATION) at 07:20

## 2023-09-01 RX ADMIN — METHYLPREDNISOLONE SODIUM SUCCINATE SCH MG: 40 INJECTION, POWDER, FOR SOLUTION INTRAMUSCULAR; INTRAVENOUS at 01:58

## 2023-09-01 RX ADMIN — NICOTINE SCH MG: 14 PATCH, EXTENDED RELEASE TRANSDERMAL at 16:41

## 2023-09-01 RX ADMIN — ATORVASTATIN CALCIUM SCH MG: 80 TABLET, FILM COATED ORAL at 21:16

## 2023-09-01 RX ADMIN — SPIRONOLACTONE SCH MG: 25 TABLET, FILM COATED ORAL at 11:58

## 2023-09-01 RX ADMIN — METHYLPREDNISOLONE SODIUM SUCCINATE SCH MG: 40 INJECTION, POWDER, FOR SOLUTION INTRAMUSCULAR; INTRAVENOUS at 18:01

## 2023-09-01 RX ADMIN — LISINOPRIL SCH MG: 20 TABLET ORAL at 11:58

## 2023-09-01 RX ADMIN — BUDESONIDE AND FORMOTEROL FUMARATE DIHYDRATE SCH PUFF: 160; 4.5 AEROSOL RESPIRATORY (INHALATION) at 11:59

## 2023-09-01 RX ADMIN — IPRATROPIUM BROMIDE AND ALBUTEROL SULFATE SCH AMP: .5; 3 SOLUTION RESPIRATORY (INHALATION) at 20:05

## 2023-09-01 RX ADMIN — IPRATROPIUM BROMIDE AND ALBUTEROL SULFATE SCH AMP: .5; 3 SOLUTION RESPIRATORY (INHALATION) at 11:49

## 2023-09-02 RX ADMIN — IPRATROPIUM BROMIDE AND ALBUTEROL SULFATE SCH AMP: .5; 3 SOLUTION RESPIRATORY (INHALATION) at 11:42

## 2023-09-02 RX ADMIN — METHYLPREDNISOLONE SODIUM SUCCINATE SCH MG: 40 INJECTION, POWDER, FOR SOLUTION INTRAMUSCULAR; INTRAVENOUS at 09:22

## 2023-09-02 RX ADMIN — SPIRONOLACTONE SCH MG: 25 TABLET, FILM COATED ORAL at 09:22

## 2023-09-02 RX ADMIN — IPRATROPIUM BROMIDE AND ALBUTEROL SULFATE SCH AMP: .5; 3 SOLUTION RESPIRATORY (INHALATION) at 15:40

## 2023-09-02 RX ADMIN — BUDESONIDE AND FORMOTEROL FUMARATE DIHYDRATE SCH PUFF: 160; 4.5 AEROSOL RESPIRATORY (INHALATION) at 09:23

## 2023-09-02 RX ADMIN — SODIUM CHLORIDE, POTASSIUM CHLORIDE, SODIUM LACTATE AND CALCIUM CHLORIDE SCH MLS/HR: 600; 310; 30; 20 INJECTION, SOLUTION INTRAVENOUS at 15:46

## 2023-09-02 RX ADMIN — ACETAMINOPHEN PRN MG: 325 TABLET ORAL at 06:50

## 2023-09-02 RX ADMIN — NICOTINE SCH MG: 14 PATCH, EXTENDED RELEASE TRANSDERMAL at 09:22

## 2023-09-02 RX ADMIN — METHYLPREDNISOLONE SODIUM SUCCINATE SCH MG: 40 INJECTION, POWDER, FOR SOLUTION INTRAMUSCULAR; INTRAVENOUS at 01:57

## 2023-09-02 RX ADMIN — SODIUM CHLORIDE, POTASSIUM CHLORIDE, SODIUM LACTATE AND CALCIUM CHLORIDE SCH MLS/HR: 600; 310; 30; 20 INJECTION, SOLUTION INTRAVENOUS at 06:43

## 2023-09-02 RX ADMIN — IPRATROPIUM BROMIDE AND ALBUTEROL SULFATE SCH AMP: .5; 3 SOLUTION RESPIRATORY (INHALATION) at 20:12

## 2023-09-02 RX ADMIN — ATORVASTATIN CALCIUM SCH MG: 80 TABLET, FILM COATED ORAL at 21:15

## 2023-09-02 RX ADMIN — METHYLPREDNISOLONE SODIUM SUCCINATE SCH MG: 40 INJECTION, POWDER, FOR SOLUTION INTRAMUSCULAR; INTRAVENOUS at 17:12

## 2023-09-02 RX ADMIN — BUDESONIDE AND FORMOTEROL FUMARATE DIHYDRATE SCH PUFF: 160; 4.5 AEROSOL RESPIRATORY (INHALATION) at 21:15

## 2023-09-02 RX ADMIN — IPRATROPIUM BROMIDE AND ALBUTEROL SULFATE SCH AMP: .5; 3 SOLUTION RESPIRATORY (INHALATION) at 07:28

## 2023-09-02 RX ADMIN — FUROSEMIDE SCH MG: 40 TABLET ORAL at 09:22

## 2023-09-02 RX ADMIN — LISINOPRIL SCH MG: 20 TABLET ORAL at 09:22

## 2023-09-03 RX ADMIN — ATORVASTATIN CALCIUM SCH MG: 80 TABLET, FILM COATED ORAL at 21:36

## 2023-09-03 RX ADMIN — FUROSEMIDE SCH MG: 40 TABLET ORAL at 09:46

## 2023-09-03 RX ADMIN — METHYLPREDNISOLONE SODIUM SUCCINATE SCH MG: 40 INJECTION, POWDER, FOR SOLUTION INTRAMUSCULAR; INTRAVENOUS at 01:37

## 2023-09-03 RX ADMIN — METHYLPREDNISOLONE SODIUM SUCCINATE SCH MG: 40 INJECTION, POWDER, FOR SOLUTION INTRAMUSCULAR; INTRAVENOUS at 21:36

## 2023-09-03 RX ADMIN — NICOTINE SCH MG: 14 PATCH, EXTENDED RELEASE TRANSDERMAL at 09:46

## 2023-09-03 RX ADMIN — METHYLPREDNISOLONE SODIUM SUCCINATE SCH MG: 40 INJECTION, POWDER, FOR SOLUTION INTRAMUSCULAR; INTRAVENOUS at 09:46

## 2023-09-03 RX ADMIN — ACETAMINOPHEN PRN MG: 325 TABLET ORAL at 07:33

## 2023-09-03 RX ADMIN — IPRATROPIUM BROMIDE AND ALBUTEROL SULFATE SCH AMP: .5; 3 SOLUTION RESPIRATORY (INHALATION) at 15:39

## 2023-09-03 RX ADMIN — IPRATROPIUM BROMIDE AND ALBUTEROL SULFATE SCH AMP: .5; 3 SOLUTION RESPIRATORY (INHALATION) at 11:43

## 2023-09-03 RX ADMIN — BUDESONIDE AND FORMOTEROL FUMARATE DIHYDRATE SCH PUFF: 160; 4.5 AEROSOL RESPIRATORY (INHALATION) at 09:47

## 2023-09-03 RX ADMIN — IPRATROPIUM BROMIDE AND ALBUTEROL SULFATE SCH AMP: .5; 3 SOLUTION RESPIRATORY (INHALATION) at 07:30

## 2023-09-03 RX ADMIN — IPRATROPIUM BROMIDE AND ALBUTEROL SULFATE SCH AMP: .5; 3 SOLUTION RESPIRATORY (INHALATION) at 19:53

## 2023-09-03 RX ADMIN — LISINOPRIL SCH MG: 20 TABLET ORAL at 09:46

## 2023-09-03 RX ADMIN — METHYLPREDNISOLONE SODIUM SUCCINATE SCH: 40 INJECTION, POWDER, FOR SOLUTION INTRAMUSCULAR; INTRAVENOUS at 10:10

## 2023-09-03 RX ADMIN — BUDESONIDE AND FORMOTEROL FUMARATE DIHYDRATE SCH PUFF: 160; 4.5 AEROSOL RESPIRATORY (INHALATION) at 21:39

## 2023-09-03 RX ADMIN — SPIRONOLACTONE SCH MG: 25 TABLET, FILM COATED ORAL at 09:46

## 2023-09-04 LAB
ALBUMIN SERPL-MCNC: 2.8 G/DL (ref 3.4–5)
ALP SERPL-CCNC: 76 U/L (ref 45–117)
ALT SERPL-CCNC: 123 U/L (ref 13–61)
ANION GAP SERPL CALC-SCNC: 8 MMOL/L (ref 8–16)
AST SERPL-CCNC: 36 U/L (ref 15–37)
BILIRUB SERPL-MCNC: 0.3 MG/DL (ref 0.2–1)
BUN SERPL-MCNC: 19.6 MG/DL (ref 7–18)
CALCIUM SERPL-MCNC: 8.7 MG/DL (ref 8.5–10.1)
CHLORIDE SERPL-SCNC: 99 MMOL/L (ref 98–107)
CO2 SERPL-SCNC: 32 MMOL/L (ref 21–32)
CREAT SERPL-MCNC: 0.6 MG/DL (ref 0.55–1.3)
DEPRECATED RDW RBC AUTO: 15.9 % (ref 11.9–15.9)
GLUCOSE SERPL-MCNC: 148 MG/DL (ref 74–106)
HCT VFR BLD CALC: 35.3 % (ref 35.4–49)
HGB BLD-MCNC: 11.2 GM/DL (ref 11.7–16.9)
MCH RBC QN AUTO: 26.9 PG (ref 25.7–33.7)
MCHC RBC AUTO-ENTMCNC: 31.6 G/DL (ref 32–35.9)
MCV RBC: 84.9 FL (ref 80–96)
PLATELET # BLD AUTO: 358 10^3/UL (ref 134–434)
PMV BLD: 8 FL (ref 7.5–11.1)
POTASSIUM SERPLBLD-SCNC: 4.2 MMOL/L (ref 3.5–5.1)
PROT SERPL-MCNC: 6.8 G/DL (ref 6.4–8.2)
RBC # BLD AUTO: 4.16 M/MM3 (ref 4–5.6)
SODIUM SERPL-SCNC: 139 MMOL/L (ref 136–145)
WBC # BLD AUTO: 14.7 K/MM3 (ref 4–10)

## 2023-09-04 RX ADMIN — IPRATROPIUM BROMIDE AND ALBUTEROL SULFATE SCH AMP: .5; 3 SOLUTION RESPIRATORY (INHALATION) at 07:53

## 2023-09-04 RX ADMIN — LISINOPRIL SCH MG: 20 TABLET ORAL at 09:19

## 2023-09-04 RX ADMIN — IPRATROPIUM BROMIDE AND ALBUTEROL SULFATE SCH AMP: .5; 3 SOLUTION RESPIRATORY (INHALATION) at 11:11

## 2023-09-04 RX ADMIN — FUROSEMIDE SCH MG: 40 TABLET ORAL at 09:19

## 2023-09-04 RX ADMIN — SPIRONOLACTONE SCH MG: 25 TABLET, FILM COATED ORAL at 09:19

## 2023-09-04 RX ADMIN — NICOTINE SCH MG: 14 PATCH, EXTENDED RELEASE TRANSDERMAL at 09:19

## 2023-09-04 RX ADMIN — BUDESONIDE AND FORMOTEROL FUMARATE DIHYDRATE SCH PUFF: 160; 4.5 AEROSOL RESPIRATORY (INHALATION) at 09:23

## 2023-09-04 RX ADMIN — METHYLPREDNISOLONE SODIUM SUCCINATE SCH MG: 40 INJECTION, POWDER, FOR SOLUTION INTRAMUSCULAR; INTRAVENOUS at 21:53

## 2023-09-04 RX ADMIN — METHYLPREDNISOLONE SODIUM SUCCINATE SCH MG: 40 INJECTION, POWDER, FOR SOLUTION INTRAMUSCULAR; INTRAVENOUS at 09:18

## 2023-09-04 RX ADMIN — IPRATROPIUM BROMIDE AND ALBUTEROL SULFATE SCH AMP: .5; 3 SOLUTION RESPIRATORY (INHALATION) at 19:36

## 2023-09-04 RX ADMIN — ACETAMINOPHEN PRN MG: 325 TABLET ORAL at 09:19

## 2023-09-04 RX ADMIN — ATORVASTATIN CALCIUM SCH MG: 80 TABLET, FILM COATED ORAL at 21:51

## 2023-09-04 RX ADMIN — ACETAMINOPHEN PRN MG: 325 TABLET ORAL at 19:45

## 2023-09-04 RX ADMIN — IPRATROPIUM BROMIDE AND ALBUTEROL SULFATE SCH AMP: .5; 3 SOLUTION RESPIRATORY (INHALATION) at 16:12

## 2023-09-04 RX ADMIN — BUDESONIDE AND FORMOTEROL FUMARATE DIHYDRATE SCH PUFF: 160; 4.5 AEROSOL RESPIRATORY (INHALATION) at 21:51

## 2023-09-05 RX ADMIN — IPRATROPIUM BROMIDE AND ALBUTEROL SULFATE SCH AMP: .5; 3 SOLUTION RESPIRATORY (INHALATION) at 11:28

## 2023-09-05 RX ADMIN — ACETAMINOPHEN PRN MG: 325 TABLET ORAL at 09:50

## 2023-09-05 RX ADMIN — ACETAMINOPHEN PRN MG: 325 TABLET ORAL at 18:30

## 2023-09-05 RX ADMIN — BUDESONIDE AND FORMOTEROL FUMARATE DIHYDRATE SCH PUFF: 160; 4.5 AEROSOL RESPIRATORY (INHALATION) at 21:15

## 2023-09-05 RX ADMIN — SPIRONOLACTONE SCH MG: 25 TABLET, FILM COATED ORAL at 09:15

## 2023-09-05 RX ADMIN — IPRATROPIUM BROMIDE AND ALBUTEROL SULFATE SCH AMP: .5; 3 SOLUTION RESPIRATORY (INHALATION) at 07:46

## 2023-09-05 RX ADMIN — FUROSEMIDE SCH MG: 40 TABLET ORAL at 09:15

## 2023-09-05 RX ADMIN — LISINOPRIL SCH MG: 20 TABLET ORAL at 09:16

## 2023-09-05 RX ADMIN — IPRATROPIUM BROMIDE AND ALBUTEROL SULFATE SCH AMP: .5; 3 SOLUTION RESPIRATORY (INHALATION) at 15:29

## 2023-09-05 RX ADMIN — METHYLPREDNISOLONE SODIUM SUCCINATE SCH MG: 40 INJECTION, POWDER, FOR SOLUTION INTRAMUSCULAR; INTRAVENOUS at 21:14

## 2023-09-05 RX ADMIN — NICOTINE SCH MG: 14 PATCH, EXTENDED RELEASE TRANSDERMAL at 09:18

## 2023-09-05 RX ADMIN — METHYLPREDNISOLONE SODIUM SUCCINATE SCH MG: 40 INJECTION, POWDER, FOR SOLUTION INTRAMUSCULAR; INTRAVENOUS at 09:17

## 2023-09-05 RX ADMIN — BUDESONIDE AND FORMOTEROL FUMARATE DIHYDRATE SCH PUFF: 160; 4.5 AEROSOL RESPIRATORY (INHALATION) at 09:17

## 2023-09-05 RX ADMIN — IPRATROPIUM BROMIDE AND ALBUTEROL SULFATE SCH AMP: .5; 3 SOLUTION RESPIRATORY (INHALATION) at 20:30

## 2023-09-05 RX ADMIN — ATORVASTATIN CALCIUM SCH MG: 80 TABLET, FILM COATED ORAL at 21:14

## 2023-09-06 VITALS — RESPIRATION RATE: 18 BRPM

## 2023-09-06 RX ADMIN — IPRATROPIUM BROMIDE AND ALBUTEROL SULFATE SCH AMP: .5; 3 SOLUTION RESPIRATORY (INHALATION) at 12:56

## 2023-09-06 RX ADMIN — ACETAMINOPHEN PRN MG: 325 TABLET ORAL at 05:50

## 2023-09-06 RX ADMIN — BUDESONIDE AND FORMOTEROL FUMARATE DIHYDRATE SCH PUFF: 160; 4.5 AEROSOL RESPIRATORY (INHALATION) at 10:07

## 2023-09-06 RX ADMIN — BUDESONIDE AND FORMOTEROL FUMARATE DIHYDRATE SCH PUFF: 160; 4.5 AEROSOL RESPIRATORY (INHALATION) at 21:21

## 2023-09-06 RX ADMIN — LISINOPRIL SCH MG: 20 TABLET ORAL at 09:57

## 2023-09-06 RX ADMIN — FUROSEMIDE SCH MG: 40 TABLET ORAL at 09:58

## 2023-09-06 RX ADMIN — SPIRONOLACTONE SCH MG: 25 TABLET, FILM COATED ORAL at 09:58

## 2023-09-06 RX ADMIN — IPRATROPIUM BROMIDE AND ALBUTEROL SULFATE SCH: .5; 3 SOLUTION RESPIRATORY (INHALATION) at 21:48

## 2023-09-06 RX ADMIN — ACETAMINOPHEN PRN MG: 325 TABLET ORAL at 12:49

## 2023-09-06 RX ADMIN — NICOTINE SCH MG: 14 PATCH, EXTENDED RELEASE TRANSDERMAL at 09:58

## 2023-09-06 RX ADMIN — IPRATROPIUM BROMIDE AND ALBUTEROL SULFATE SCH: .5; 3 SOLUTION RESPIRATORY (INHALATION) at 17:00

## 2023-09-06 RX ADMIN — METHYLPREDNISOLONE SODIUM SUCCINATE SCH MG: 40 INJECTION, POWDER, FOR SOLUTION INTRAMUSCULAR; INTRAVENOUS at 09:58

## 2023-09-06 RX ADMIN — METHYLPREDNISOLONE SODIUM SUCCINATE SCH MG: 40 INJECTION, POWDER, FOR SOLUTION INTRAMUSCULAR; INTRAVENOUS at 21:21

## 2023-09-06 RX ADMIN — IPRATROPIUM BROMIDE AND ALBUTEROL SULFATE SCH AMP: .5; 3 SOLUTION RESPIRATORY (INHALATION) at 08:45

## 2023-09-06 RX ADMIN — ACETAMINOPHEN PRN MG: 325 TABLET ORAL at 23:28

## 2023-09-06 RX ADMIN — IPRATROPIUM BROMIDE AND ALBUTEROL SULFATE SCH AMP: .5; 3 SOLUTION RESPIRATORY (INHALATION) at 16:55

## 2023-09-06 RX ADMIN — ATORVASTATIN CALCIUM SCH MG: 80 TABLET, FILM COATED ORAL at 21:20

## 2023-09-07 RX ADMIN — NICOTINE SCH MG: 14 PATCH, EXTENDED RELEASE TRANSDERMAL at 09:12

## 2023-09-07 RX ADMIN — BUDESONIDE AND FORMOTEROL FUMARATE DIHYDRATE SCH PUFF: 160; 4.5 AEROSOL RESPIRATORY (INHALATION) at 09:12

## 2023-09-07 RX ADMIN — ACETAMINOPHEN PRN MG: 325 TABLET ORAL at 21:41

## 2023-09-07 RX ADMIN — METHYLPREDNISOLONE SODIUM SUCCINATE SCH MG: 40 INJECTION, POWDER, FOR SOLUTION INTRAMUSCULAR; INTRAVENOUS at 21:38

## 2023-09-07 RX ADMIN — FUROSEMIDE SCH MG: 40 TABLET ORAL at 09:12

## 2023-09-07 RX ADMIN — IPRATROPIUM BROMIDE AND ALBUTEROL SULFATE SCH AMP: .5; 3 SOLUTION RESPIRATORY (INHALATION) at 15:36

## 2023-09-07 RX ADMIN — IPRATROPIUM BROMIDE AND ALBUTEROL SULFATE SCH AMP: .5; 3 SOLUTION RESPIRATORY (INHALATION) at 20:30

## 2023-09-07 RX ADMIN — IPRATROPIUM BROMIDE AND ALBUTEROL SULFATE SCH AMP: .5; 3 SOLUTION RESPIRATORY (INHALATION) at 11:27

## 2023-09-07 RX ADMIN — BUDESONIDE AND FORMOTEROL FUMARATE DIHYDRATE SCH PUFF: 160; 4.5 AEROSOL RESPIRATORY (INHALATION) at 21:38

## 2023-09-07 RX ADMIN — ATORVASTATIN CALCIUM SCH MG: 80 TABLET, FILM COATED ORAL at 21:38

## 2023-09-07 RX ADMIN — SPIRONOLACTONE SCH MG: 25 TABLET, FILM COATED ORAL at 09:12

## 2023-09-07 RX ADMIN — METHYLPREDNISOLONE SODIUM SUCCINATE SCH MG: 40 INJECTION, POWDER, FOR SOLUTION INTRAMUSCULAR; INTRAVENOUS at 09:11

## 2023-09-07 RX ADMIN — IPRATROPIUM BROMIDE AND ALBUTEROL SULFATE SCH AMP: .5; 3 SOLUTION RESPIRATORY (INHALATION) at 07:40

## 2023-09-07 RX ADMIN — LISINOPRIL SCH MG: 20 TABLET ORAL at 09:12

## 2023-09-08 VITALS — SYSTOLIC BLOOD PRESSURE: 115 MMHG | DIASTOLIC BLOOD PRESSURE: 72 MMHG | TEMPERATURE: 98.1 F | HEART RATE: 89 BPM

## 2023-09-08 RX ADMIN — NICOTINE SCH MG: 14 PATCH, EXTENDED RELEASE TRANSDERMAL at 10:17

## 2023-09-08 RX ADMIN — METHYLPREDNISOLONE SODIUM SUCCINATE SCH MG: 40 INJECTION, POWDER, FOR SOLUTION INTRAMUSCULAR; INTRAVENOUS at 10:17

## 2023-09-08 RX ADMIN — BUDESONIDE AND FORMOTEROL FUMARATE DIHYDRATE SCH PUFF: 160; 4.5 AEROSOL RESPIRATORY (INHALATION) at 10:18

## 2023-09-08 RX ADMIN — LISINOPRIL SCH MG: 20 TABLET ORAL at 10:18

## 2023-09-08 RX ADMIN — IPRATROPIUM BROMIDE AND ALBUTEROL SULFATE SCH AMP: .5; 3 SOLUTION RESPIRATORY (INHALATION) at 08:18

## 2023-09-08 RX ADMIN — ACETAMINOPHEN PRN MG: 325 TABLET ORAL at 15:18

## 2023-09-08 RX ADMIN — IPRATROPIUM BROMIDE AND ALBUTEROL SULFATE SCH AMP: .5; 3 SOLUTION RESPIRATORY (INHALATION) at 12:19

## 2023-09-08 RX ADMIN — FUROSEMIDE SCH MG: 40 TABLET ORAL at 10:18

## 2023-09-08 RX ADMIN — IPRATROPIUM BROMIDE AND ALBUTEROL SULFATE SCH AMP: .5; 3 SOLUTION RESPIRATORY (INHALATION) at 16:01

## 2023-09-08 RX ADMIN — SPIRONOLACTONE SCH MG: 25 TABLET, FILM COATED ORAL at 10:18

## 2024-01-09 ENCOUNTER — HOSPITAL ENCOUNTER (INPATIENT)
Dept: HOSPITAL 74 - JASU-SURG | Age: 69
LOS: 11 days | Discharge: HOME | DRG: 350 | End: 2024-01-20
Attending: STUDENT IN AN ORGANIZED HEALTH CARE EDUCATION/TRAINING PROGRAM | Admitting: STUDENT IN AN ORGANIZED HEALTH CARE EDUCATION/TRAINING PROGRAM
Payer: COMMERCIAL

## 2024-01-09 VITALS — BODY MASS INDEX: 33.6 KG/M2

## 2024-01-09 DIAGNOSIS — K40.30: Primary | ICD-10-CM

## 2024-01-09 DIAGNOSIS — I25.10: ICD-10-CM

## 2024-01-09 DIAGNOSIS — I50.9: ICD-10-CM

## 2024-01-09 DIAGNOSIS — I10: ICD-10-CM

## 2024-01-09 DIAGNOSIS — J96.01: ICD-10-CM

## 2024-01-09 DIAGNOSIS — J18.9: ICD-10-CM

## 2024-01-09 DIAGNOSIS — E05.90: ICD-10-CM

## 2024-01-09 DIAGNOSIS — J44.1: ICD-10-CM

## 2024-01-09 DIAGNOSIS — I11.0: ICD-10-CM

## 2024-01-09 DIAGNOSIS — J43.9: ICD-10-CM

## 2024-01-09 DIAGNOSIS — R04.2: ICD-10-CM

## 2024-01-09 DIAGNOSIS — D64.9: ICD-10-CM

## 2024-01-09 PROCEDURE — 0YU60JZ SUPPLEMENT LEFT INGUINAL REGION WITH SYNTHETIC SUBSTITUTE, OPEN APPROACH: ICD-10-PCS | Performed by: SURGERY

## 2024-01-09 PROCEDURE — C1781 MESH (IMPLANTABLE): HCPCS

## 2024-01-09 RX ADMIN — IPRATROPIUM BROMIDE AND ALBUTEROL SULFATE SCH AMP: .5; 3 SOLUTION RESPIRATORY (INHALATION) at 18:55

## 2024-01-09 RX ADMIN — POLYETHYLENE GLYCOL 3350 SCH GM: 17 POWDER, FOR SOLUTION ORAL at 22:51

## 2024-01-09 RX ADMIN — METHYLPREDNISOLONE SODIUM SUCCINATE SCH MG: 40 INJECTION, POWDER, FOR SOLUTION INTRAMUSCULAR; INTRAVENOUS at 18:06

## 2024-01-09 RX ADMIN — HEPARIN SODIUM SCH UNIT: 5000 INJECTION, SOLUTION INTRAVENOUS; SUBCUTANEOUS at 22:48

## 2024-01-10 LAB
ALBUMIN SERPL-MCNC: 3.1 G/DL (ref 3.4–5)
ALP SERPL-CCNC: 79 U/L (ref 45–117)
ALT SERPL-CCNC: 16 U/L (ref 13–61)
ANION GAP SERPL CALC-SCNC: 7 MMOL/L (ref 4–13)
ARTERIAL PATENCY WRIST A: POSITIVE
AST SERPL-CCNC: 19 U/L (ref 15–37)
BASE EXCESS BLDA CALC-SCNC: 2.8 MMOL/L (ref -2–2)
BASOPHILS # BLD: 0.1 % (ref 0–2)
BILIRUB SERPL-MCNC: 0.3 MG/DL (ref 0.2–1)
BUN SERPL-MCNC: 13.3 MG/DL (ref 7–18)
CALCIUM SERPL-MCNC: 9.1 MG/DL (ref 8.5–10.1)
CHLORIDE SERPL-SCNC: 102 MMOL/L (ref 98–107)
CO2 SERPL-SCNC: 31 MMOL/L (ref 21–32)
CREAT SERPL-MCNC: 0.6 MG/DL (ref 0.55–1.3)
DEPRECATED RDW RBC AUTO: 14.6 % (ref 11.9–15.9)
EOSINOPHIL # BLD: 0 % (ref 0–4.5)
GLUCOSE SERPL-MCNC: 111 MG/DL (ref 74–106)
HCT VFR BLD CALC: 34.3 % (ref 35.4–49)
HCT VFR BLDV CALC: 31 % (ref 35.4–49)
HGB BLD-MCNC: 10.7 GM/DL (ref 11.7–16.9)
LYMPHOCYTES # BLD: 9 % (ref 8–40)
MCH RBC QN AUTO: 27.2 PG (ref 25.7–33.7)
MCHC RBC AUTO-ENTMCNC: 31.2 G/DL (ref 32–35.9)
MCV RBC: 87.4 FL (ref 80–96)
MONOCYTES # BLD AUTO: 7 % (ref 3.8–10.2)
NEUTROPHILS # BLD: 83.9 % (ref 42.8–82.8)
PCO2 BLDA: 41.5 MMHG (ref 35–45)
PLATELET # BLD AUTO: 326 10^3/UL (ref 134–434)
PMV BLD: 8.2 FL (ref 7.5–11.1)
PO2 BLDA: 61.4 MMHG (ref 80–100)
POTASSIUM SERPLBLD-SCNC: 4.6 MMOL/L (ref 3.5–5.1)
PROT SERPL-MCNC: 6.7 G/DL (ref 6.4–8.2)
RBC # BLD AUTO: 3.92 M/MM3 (ref 4–5.6)
SAO2 % BLDA: 92.3 % (ref 95–98)
SODIUM SERPL-SCNC: 139 MMOL/L (ref 136–145)
WBC # BLD AUTO: 16.2 K/MM3 (ref 4–10)

## 2024-01-10 RX ADMIN — HEPARIN SODIUM SCH UNIT: 5000 INJECTION, SOLUTION INTRAVENOUS; SUBCUTANEOUS at 10:35

## 2024-01-10 RX ADMIN — HEPARIN SODIUM SCH UNIT: 5000 INJECTION, SOLUTION INTRAVENOUS; SUBCUTANEOUS at 22:09

## 2024-01-10 RX ADMIN — POLYETHYLENE GLYCOL 3350 SCH GM: 17 POWDER, FOR SOLUTION ORAL at 10:35

## 2024-01-10 RX ADMIN — METHYLPREDNISOLONE SODIUM SUCCINATE SCH MG: 40 INJECTION, POWDER, FOR SOLUTION INTRAMUSCULAR; INTRAVENOUS at 17:04

## 2024-01-10 RX ADMIN — IPRATROPIUM BROMIDE AND ALBUTEROL SULFATE SCH AMP: .5; 3 SOLUTION RESPIRATORY (INHALATION) at 15:37

## 2024-01-10 RX ADMIN — PIPERACILLIN SODIUM,TAZOBACTAM SODIUM SCH MLS/HR: 3; .375 INJECTION, POWDER, FOR SOLUTION INTRAVENOUS at 22:09

## 2024-01-10 RX ADMIN — METHYLPREDNISOLONE SODIUM SUCCINATE SCH MG: 40 INJECTION, POWDER, FOR SOLUTION INTRAMUSCULAR; INTRAVENOUS at 01:45

## 2024-01-10 RX ADMIN — IPRATROPIUM BROMIDE AND ALBUTEROL SULFATE SCH AMP: .5; 3 SOLUTION RESPIRATORY (INHALATION) at 20:37

## 2024-01-10 RX ADMIN — IPRATROPIUM BROMIDE AND ALBUTEROL SULFATE SCH AMP: .5; 3 SOLUTION RESPIRATORY (INHALATION) at 11:19

## 2024-01-10 RX ADMIN — LISINOPRIL SCH MG: 20 TABLET ORAL at 10:35

## 2024-01-10 RX ADMIN — IPRATROPIUM BROMIDE AND ALBUTEROL SULFATE SCH AMP: .5; 3 SOLUTION RESPIRATORY (INHALATION) at 08:11

## 2024-01-10 RX ADMIN — METHYLPREDNISOLONE SODIUM SUCCINATE SCH MG: 40 INJECTION, POWDER, FOR SOLUTION INTRAMUSCULAR; INTRAVENOUS at 10:35

## 2024-01-10 RX ADMIN — FLUTICASONE FUROATE, UMECLIDINIUM BROMIDE AND VILANTEROL TRIFENATATE SCH PUFF: 200; 62.5; 25 POWDER RESPIRATORY (INHALATION) at 12:45

## 2024-01-10 RX ADMIN — PANTOPRAZOLE SODIUM SCH MG: 40 TABLET, DELAYED RELEASE ORAL at 10:35

## 2024-01-10 RX ADMIN — SPIRONOLACTONE SCH MG: 25 TABLET, FILM COATED ORAL at 10:35

## 2024-01-10 RX ADMIN — POLYETHYLENE GLYCOL 3350 SCH GM: 17 POWDER, FOR SOLUTION ORAL at 22:09

## 2024-01-10 RX ADMIN — PIPERACILLIN SODIUM,TAZOBACTAM SODIUM SCH MLS/HR: 3; .375 INJECTION, POWDER, FOR SOLUTION INTRAVENOUS at 12:46

## 2024-01-11 RX ADMIN — IPRATROPIUM BROMIDE AND ALBUTEROL SULFATE SCH AMP: .5; 3 SOLUTION RESPIRATORY (INHALATION) at 07:10

## 2024-01-11 RX ADMIN — PIPERACILLIN SODIUM,TAZOBACTAM SODIUM SCH MLS/HR: 3; .375 INJECTION, POWDER, FOR SOLUTION INTRAVENOUS at 02:33

## 2024-01-11 RX ADMIN — IPRATROPIUM BROMIDE AND ALBUTEROL SULFATE SCH AMP: .5; 3 SOLUTION RESPIRATORY (INHALATION) at 20:15

## 2024-01-11 RX ADMIN — METHYLPREDNISOLONE SODIUM SUCCINATE SCH MG: 40 INJECTION, POWDER, FOR SOLUTION INTRAMUSCULAR; INTRAVENOUS at 09:08

## 2024-01-11 RX ADMIN — HEPARIN SODIUM SCH UNIT: 5000 INJECTION, SOLUTION INTRAVENOUS; SUBCUTANEOUS at 21:30

## 2024-01-11 RX ADMIN — POLYETHYLENE GLYCOL 3350 SCH GM: 17 POWDER, FOR SOLUTION ORAL at 21:31

## 2024-01-11 RX ADMIN — IPRATROPIUM BROMIDE AND ALBUTEROL SULFATE SCH AMP: .5; 3 SOLUTION RESPIRATORY (INHALATION) at 15:20

## 2024-01-11 RX ADMIN — IPRATROPIUM BROMIDE AND ALBUTEROL SULFATE SCH AMP: .5; 3 SOLUTION RESPIRATORY (INHALATION) at 11:30

## 2024-01-11 RX ADMIN — PIPERACILLIN SODIUM,TAZOBACTAM SODIUM SCH MLS/HR: 3; .375 INJECTION, POWDER, FOR SOLUTION INTRAVENOUS at 21:31

## 2024-01-11 RX ADMIN — POLYETHYLENE GLYCOL 3350 SCH GM: 17 POWDER, FOR SOLUTION ORAL at 09:09

## 2024-01-11 RX ADMIN — FLUTICASONE FUROATE, UMECLIDINIUM BROMIDE AND VILANTEROL TRIFENATATE SCH PUFF: 200; 62.5; 25 POWDER RESPIRATORY (INHALATION) at 09:09

## 2024-01-11 RX ADMIN — METHYLPREDNISOLONE SODIUM SUCCINATE SCH MG: 40 INJECTION, POWDER, FOR SOLUTION INTRAMUSCULAR; INTRAVENOUS at 17:54

## 2024-01-11 RX ADMIN — HEPARIN SODIUM SCH UNIT: 5000 INJECTION, SOLUTION INTRAVENOUS; SUBCUTANEOUS at 09:08

## 2024-01-11 RX ADMIN — LISINOPRIL SCH MG: 20 TABLET ORAL at 09:09

## 2024-01-11 RX ADMIN — PIPERACILLIN SODIUM,TAZOBACTAM SODIUM SCH MLS/HR: 3; .375 INJECTION, POWDER, FOR SOLUTION INTRAVENOUS at 09:08

## 2024-01-11 RX ADMIN — PIPERACILLIN SODIUM,TAZOBACTAM SODIUM SCH MLS/HR: 3; .375 INJECTION, POWDER, FOR SOLUTION INTRAVENOUS at 14:08

## 2024-01-11 RX ADMIN — SPIRONOLACTONE SCH MG: 25 TABLET, FILM COATED ORAL at 09:09

## 2024-01-11 RX ADMIN — ACETAMINOPHEN PRN MG: 500 TABLET, FILM COATED ORAL at 09:21

## 2024-01-11 RX ADMIN — METHYLPREDNISOLONE SODIUM SUCCINATE SCH MG: 40 INJECTION, POWDER, FOR SOLUTION INTRAMUSCULAR; INTRAVENOUS at 02:00

## 2024-01-11 RX ADMIN — PANTOPRAZOLE SODIUM SCH MG: 40 TABLET, DELAYED RELEASE ORAL at 09:09

## 2024-01-12 LAB
ALBUMIN SERPL-MCNC: 2.8 G/DL (ref 3.4–5)
ALP SERPL-CCNC: 69 U/L (ref 45–117)
ALT SERPL-CCNC: 29 U/L (ref 13–61)
ANION GAP SERPL CALC-SCNC: 5 MMOL/L (ref 4–13)
AST SERPL-CCNC: 17 U/L (ref 15–37)
BASOPHILS # BLD: 0.1 % (ref 0–2)
BILIRUB SERPL-MCNC: 0.3 MG/DL (ref 0.2–1)
BUN SERPL-MCNC: 15.8 MG/DL (ref 7–18)
CALCIUM SERPL-MCNC: 8.3 MG/DL (ref 8.5–10.1)
CHLORIDE SERPL-SCNC: 106 MMOL/L (ref 98–107)
CO2 SERPL-SCNC: 29 MMOL/L (ref 21–32)
CREAT SERPL-MCNC: 0.7 MG/DL (ref 0.55–1.3)
DEPRECATED RDW RBC AUTO: 14.4 % (ref 11.9–15.9)
EOSINOPHIL # BLD: 0 % (ref 0–4.5)
GLUCOSE SERPL-MCNC: 136 MG/DL (ref 74–106)
HCT VFR BLD CALC: 29.1 % (ref 35.4–49)
HGB BLD-MCNC: 9.1 GM/DL (ref 11.7–16.9)
LYMPHOCYTES # BLD: 7.2 % (ref 8–40)
MAGNESIUM SERPL-MCNC: 2.5 MG/DL (ref 1.8–2.4)
MCH RBC QN AUTO: 27.5 PG (ref 25.7–33.7)
MCHC RBC AUTO-ENTMCNC: 31.4 G/DL (ref 32–35.9)
MCV RBC: 87.8 FL (ref 80–96)
MONOCYTES # BLD AUTO: 5.9 % (ref 3.8–10.2)
NEUTROPHILS # BLD: 86.8 % (ref 42.8–82.8)
PHOSPHATE SERPL-MCNC: 2.3 MG/DL (ref 2.5–4.9)
PLATELET # BLD AUTO: 268 10^3/UL (ref 134–434)
PMV BLD: 8.4 FL (ref 7.5–11.1)
POTASSIUM SERPLBLD-SCNC: 4.5 MMOL/L (ref 3.5–5.1)
PROT SERPL-MCNC: 6.6 G/DL (ref 6.4–8.2)
RBC # BLD AUTO: 3.31 M/MM3 (ref 4–5.6)
SODIUM SERPL-SCNC: 140 MMOL/L (ref 136–145)
WBC # BLD AUTO: 15 K/MM3 (ref 4–10)

## 2024-01-12 RX ADMIN — METHYLPREDNISOLONE SODIUM SUCCINATE SCH MG: 40 INJECTION, POWDER, FOR SOLUTION INTRAMUSCULAR; INTRAVENOUS at 02:25

## 2024-01-12 RX ADMIN — PIPERACILLIN SODIUM,TAZOBACTAM SODIUM SCH MLS/HR: 3; .375 INJECTION, POWDER, FOR SOLUTION INTRAVENOUS at 02:25

## 2024-01-12 RX ADMIN — FLUTICASONE FUROATE, UMECLIDINIUM BROMIDE AND VILANTEROL TRIFENATATE SCH PUFF: 200; 62.5; 25 POWDER RESPIRATORY (INHALATION) at 09:33

## 2024-01-12 RX ADMIN — LISINOPRIL SCH MG: 20 TABLET ORAL at 09:33

## 2024-01-12 RX ADMIN — BENZONATATE SCH MG: 100 CAPSULE ORAL at 12:50

## 2024-01-12 RX ADMIN — IPRATROPIUM BROMIDE AND ALBUTEROL SULFATE SCH AMP: .5; 3 SOLUTION RESPIRATORY (INHALATION) at 15:31

## 2024-01-12 RX ADMIN — IPRATROPIUM BROMIDE AND ALBUTEROL SULFATE SCH AMP: .5; 3 SOLUTION RESPIRATORY (INHALATION) at 08:13

## 2024-01-12 RX ADMIN — IPRATROPIUM BROMIDE AND ALBUTEROL SULFATE SCH: .5; 3 SOLUTION RESPIRATORY (INHALATION) at 12:04

## 2024-01-12 RX ADMIN — ACETAMINOPHEN PRN MG: 500 TABLET, FILM COATED ORAL at 09:44

## 2024-01-12 RX ADMIN — HEPARIN SODIUM SCH UNIT: 5000 INJECTION, SOLUTION INTRAVENOUS; SUBCUTANEOUS at 09:33

## 2024-01-12 RX ADMIN — PIPERACILLIN SODIUM,TAZOBACTAM SODIUM SCH MLS/HR: 3; .375 INJECTION, POWDER, FOR SOLUTION INTRAVENOUS at 16:19

## 2024-01-12 RX ADMIN — PIPERACILLIN SODIUM,TAZOBACTAM SODIUM SCH MLS/HR: 3; .375 INJECTION, POWDER, FOR SOLUTION INTRAVENOUS at 09:33

## 2024-01-12 RX ADMIN — SPIRONOLACTONE SCH MG: 25 TABLET, FILM COATED ORAL at 09:33

## 2024-01-12 RX ADMIN — BENZONATATE SCH MG: 100 CAPSULE ORAL at 21:32

## 2024-01-12 RX ADMIN — PANTOPRAZOLE SODIUM SCH MG: 40 TABLET, DELAYED RELEASE ORAL at 09:33

## 2024-01-12 RX ADMIN — METHYLPREDNISOLONE SODIUM SUCCINATE SCH MG: 40 INJECTION, POWDER, FOR SOLUTION INTRAMUSCULAR; INTRAVENOUS at 09:33

## 2024-01-12 RX ADMIN — HEPARIN SODIUM SCH UNIT: 5000 INJECTION, SOLUTION INTRAVENOUS; SUBCUTANEOUS at 21:32

## 2024-01-12 RX ADMIN — IPRATROPIUM BROMIDE AND ALBUTEROL SULFATE SCH AMP: .5; 3 SOLUTION RESPIRATORY (INHALATION) at 20:30

## 2024-01-12 RX ADMIN — POLYETHYLENE GLYCOL 3350 SCH GM: 17 POWDER, FOR SOLUTION ORAL at 09:33

## 2024-01-12 RX ADMIN — METHYLPREDNISOLONE SODIUM SUCCINATE SCH MG: 40 INJECTION, POWDER, FOR SOLUTION INTRAMUSCULAR; INTRAVENOUS at 18:44

## 2024-01-12 RX ADMIN — PIPERACILLIN SODIUM,TAZOBACTAM SODIUM SCH MLS/HR: 3; .375 INJECTION, POWDER, FOR SOLUTION INTRAVENOUS at 21:31

## 2024-01-12 RX ADMIN — POLYETHYLENE GLYCOL 3350 SCH GM: 17 POWDER, FOR SOLUTION ORAL at 21:32

## 2024-01-13 RX ADMIN — METHYLPREDNISOLONE SODIUM SUCCINATE SCH MG: 40 INJECTION, POWDER, FOR SOLUTION INTRAMUSCULAR; INTRAVENOUS at 02:22

## 2024-01-13 RX ADMIN — PIPERACILLIN SODIUM,TAZOBACTAM SODIUM SCH MLS/HR: 3; .375 INJECTION, POWDER, FOR SOLUTION INTRAVENOUS at 09:32

## 2024-01-13 RX ADMIN — HEPARIN SODIUM SCH UNIT: 5000 INJECTION, SOLUTION INTRAVENOUS; SUBCUTANEOUS at 09:33

## 2024-01-13 RX ADMIN — HEPARIN SODIUM SCH UNIT: 5000 INJECTION, SOLUTION INTRAVENOUS; SUBCUTANEOUS at 21:05

## 2024-01-13 RX ADMIN — IPRATROPIUM BROMIDE AND ALBUTEROL SULFATE SCH AMP: .5; 3 SOLUTION RESPIRATORY (INHALATION) at 20:00

## 2024-01-13 RX ADMIN — IPRATROPIUM BROMIDE AND ALBUTEROL SULFATE SCH AMP: .5; 3 SOLUTION RESPIRATORY (INHALATION) at 16:22

## 2024-01-13 RX ADMIN — PIPERACILLIN SODIUM,TAZOBACTAM SODIUM SCH MLS/HR: 3; .375 INJECTION, POWDER, FOR SOLUTION INTRAVENOUS at 20:51

## 2024-01-13 RX ADMIN — IPRATROPIUM BROMIDE AND ALBUTEROL SULFATE SCH AMP: .5; 3 SOLUTION RESPIRATORY (INHALATION) at 11:25

## 2024-01-13 RX ADMIN — IPRATROPIUM BROMIDE AND ALBUTEROL SULFATE SCH AMP: .5; 3 SOLUTION RESPIRATORY (INHALATION) at 07:40

## 2024-01-13 RX ADMIN — LISINOPRIL SCH MG: 20 TABLET ORAL at 09:33

## 2024-01-13 RX ADMIN — PANTOPRAZOLE SODIUM SCH MG: 40 TABLET, DELAYED RELEASE ORAL at 09:33

## 2024-01-13 RX ADMIN — SPIRONOLACTONE SCH MG: 25 TABLET, FILM COATED ORAL at 09:33

## 2024-01-13 RX ADMIN — PIPERACILLIN SODIUM,TAZOBACTAM SODIUM SCH MLS/HR: 3; .375 INJECTION, POWDER, FOR SOLUTION INTRAVENOUS at 02:21

## 2024-01-13 RX ADMIN — PIPERACILLIN SODIUM,TAZOBACTAM SODIUM SCH MLS/HR: 3; .375 INJECTION, POWDER, FOR SOLUTION INTRAVENOUS at 14:56

## 2024-01-13 RX ADMIN — BENZONATATE SCH MG: 100 CAPSULE ORAL at 14:56

## 2024-01-13 RX ADMIN — METHYLPREDNISOLONE SODIUM SUCCINATE SCH MG: 40 INJECTION, POWDER, FOR SOLUTION INTRAMUSCULAR; INTRAVENOUS at 09:34

## 2024-01-13 RX ADMIN — FLUTICASONE FUROATE, UMECLIDINIUM BROMIDE AND VILANTEROL TRIFENATATE SCH PUFF: 200; 62.5; 25 POWDER RESPIRATORY (INHALATION) at 09:36

## 2024-01-13 RX ADMIN — BENZONATATE SCH MG: 100 CAPSULE ORAL at 06:18

## 2024-01-13 RX ADMIN — POLYETHYLENE GLYCOL 3350 SCH GM: 17 POWDER, FOR SOLUTION ORAL at 09:36

## 2024-01-13 RX ADMIN — BENZONATATE SCH MG: 100 CAPSULE ORAL at 23:57

## 2024-01-13 RX ADMIN — POLYETHYLENE GLYCOL 3350 SCH GM: 17 POWDER, FOR SOLUTION ORAL at 23:57

## 2024-01-14 LAB
ALBUMIN SERPL-MCNC: 2.6 G/DL (ref 3.4–5)
ALP SERPL-CCNC: 68 U/L (ref 45–117)
ALT SERPL-CCNC: 56 U/L (ref 13–61)
ANION GAP SERPL CALC-SCNC: 2 MMOL/L (ref 4–13)
ANISOCYTOSIS BLD QL: (no result)
AST SERPL-CCNC: 20 U/L (ref 15–37)
BASOPHILS # BLD: 0.1 % (ref 0–2)
BILIRUB SERPL-MCNC: 0.4 MG/DL (ref 0.2–1)
BUN SERPL-MCNC: 20.3 MG/DL (ref 7–18)
CALCIUM SERPL-MCNC: 8.2 MG/DL (ref 8.5–10.1)
CHLORIDE SERPL-SCNC: 105 MMOL/L (ref 98–107)
CO2 SERPL-SCNC: 30 MMOL/L (ref 21–32)
CREAT SERPL-MCNC: 0.7 MG/DL (ref 0.55–1.3)
DEPRECATED RDW RBC AUTO: 14.1 % (ref 11.9–15.9)
EOSINOPHIL # BLD: 0.2 % (ref 0–4.5)
GLUCOSE SERPL-MCNC: 123 MG/DL (ref 74–106)
HCT VFR BLD CALC: 30.1 % (ref 35.4–49)
HGB BLD-MCNC: 9.2 GM/DL (ref 11.7–16.9)
LYMPHOCYTES # BLD: 18.3 % (ref 8–40)
MACROCYTES BLD QL: 0
MCH RBC QN AUTO: 27.2 PG (ref 25.7–33.7)
MCHC RBC AUTO-ENTMCNC: 30.6 G/DL (ref 32–35.9)
MCV RBC: 88.7 FL (ref 80–96)
MONOCYTES # BLD AUTO: 8.9 % (ref 3.8–10.2)
NEUTROPHILS # BLD: 72.5 % (ref 42.8–82.8)
PLATELET # BLD AUTO: 282 10^3/UL (ref 134–434)
PMV BLD: 8.2 FL (ref 7.5–11.1)
POTASSIUM SERPLBLD-SCNC: 3.8 MMOL/L (ref 3.5–5.1)
PROT SERPL-MCNC: 6.3 G/DL (ref 6.4–8.2)
RBC # BLD AUTO: 3.39 M/MM3 (ref 4–5.6)
SODIUM SERPL-SCNC: 138 MMOL/L (ref 136–145)
WBC # BLD AUTO: 13.5 K/MM3 (ref 4–10)

## 2024-01-14 RX ADMIN — PANTOPRAZOLE SODIUM SCH MG: 40 TABLET, DELAYED RELEASE ORAL at 10:40

## 2024-01-14 RX ADMIN — IPRATROPIUM BROMIDE AND ALBUTEROL SULFATE SCH AMP: .5; 3 SOLUTION RESPIRATORY (INHALATION) at 16:04

## 2024-01-14 RX ADMIN — POLYETHYLENE GLYCOL 3350 SCH GM: 17 POWDER, FOR SOLUTION ORAL at 21:05

## 2024-01-14 RX ADMIN — IPRATROPIUM BROMIDE AND ALBUTEROL SULFATE SCH AMP: .5; 3 SOLUTION RESPIRATORY (INHALATION) at 11:18

## 2024-01-14 RX ADMIN — HEPARIN SODIUM SCH UNIT: 5000 INJECTION, SOLUTION INTRAVENOUS; SUBCUTANEOUS at 10:39

## 2024-01-14 RX ADMIN — BENZONATATE SCH MG: 100 CAPSULE ORAL at 15:05

## 2024-01-14 RX ADMIN — PIPERACILLIN SODIUM,TAZOBACTAM SODIUM SCH MLS/HR: 3; .375 INJECTION, POWDER, FOR SOLUTION INTRAVENOUS at 15:04

## 2024-01-14 RX ADMIN — PIPERACILLIN SODIUM,TAZOBACTAM SODIUM SCH MLS/HR: 3; .375 INJECTION, POWDER, FOR SOLUTION INTRAVENOUS at 10:38

## 2024-01-14 RX ADMIN — HEPARIN SODIUM SCH UNIT: 5000 INJECTION, SOLUTION INTRAVENOUS; SUBCUTANEOUS at 21:05

## 2024-01-14 RX ADMIN — POLYETHYLENE GLYCOL 3350 SCH GM: 17 POWDER, FOR SOLUTION ORAL at 10:39

## 2024-01-14 RX ADMIN — PIPERACILLIN SODIUM,TAZOBACTAM SODIUM SCH MLS/HR: 3; .375 INJECTION, POWDER, FOR SOLUTION INTRAVENOUS at 21:06

## 2024-01-14 RX ADMIN — METHYLPREDNISOLONE SODIUM SUCCINATE SCH MG: 40 INJECTION, POWDER, FOR SOLUTION INTRAMUSCULAR; INTRAVENOUS at 10:39

## 2024-01-14 RX ADMIN — BENZONATATE SCH MG: 100 CAPSULE ORAL at 06:00

## 2024-01-14 RX ADMIN — BENZONATATE SCH MG: 100 CAPSULE ORAL at 21:06

## 2024-01-14 RX ADMIN — PIPERACILLIN SODIUM,TAZOBACTAM SODIUM SCH MLS/HR: 3; .375 INJECTION, POWDER, FOR SOLUTION INTRAVENOUS at 02:06

## 2024-01-14 RX ADMIN — LISINOPRIL SCH MG: 20 TABLET ORAL at 10:40

## 2024-01-14 RX ADMIN — FLUTICASONE FUROATE, UMECLIDINIUM BROMIDE AND VILANTEROL TRIFENATATE SCH PUFF: 200; 62.5; 25 POWDER RESPIRATORY (INHALATION) at 10:40

## 2024-01-14 RX ADMIN — SPIRONOLACTONE SCH MG: 25 TABLET, FILM COATED ORAL at 10:40

## 2024-01-14 RX ADMIN — IPRATROPIUM BROMIDE AND ALBUTEROL SULFATE SCH AMP: .5; 3 SOLUTION RESPIRATORY (INHALATION) at 07:40

## 2024-01-14 RX ADMIN — IPRATROPIUM BROMIDE AND ALBUTEROL SULFATE SCH AMP: .5; 3 SOLUTION RESPIRATORY (INHALATION) at 20:05

## 2024-01-14 RX ADMIN — ACETAMINOPHEN PRN MG: 500 TABLET, FILM COATED ORAL at 10:36

## 2024-01-15 LAB
ALBUMIN SERPL-MCNC: 2.6 G/DL (ref 3.4–5)
ALP SERPL-CCNC: 75 U/L (ref 45–117)
ALT SERPL-CCNC: 58 U/L (ref 13–61)
ANION GAP SERPL CALC-SCNC: 6 MMOL/L (ref 4–13)
ANISOCYTOSIS BLD QL: 0
AST SERPL-CCNC: 14 U/L (ref 15–37)
BILIRUB SERPL-MCNC: 0.4 MG/DL (ref 0.2–1)
BUN SERPL-MCNC: 17.1 MG/DL (ref 7–18)
CALCIUM SERPL-MCNC: 8.5 MG/DL (ref 8.5–10.1)
CHLORIDE SERPL-SCNC: 102 MMOL/L (ref 98–107)
CO2 SERPL-SCNC: 30 MMOL/L (ref 21–32)
CREAT SERPL-MCNC: 0.8 MG/DL (ref 0.55–1.3)
DEPRECATED RDW RBC AUTO: 14.6 % (ref 11.9–15.9)
GLUCOSE SERPL-MCNC: 133 MG/DL (ref 74–106)
HCT VFR BLD CALC: 31 % (ref 35.4–49)
HGB BLD-MCNC: 9.6 GM/DL (ref 11.7–16.9)
MACROCYTES BLD QL: 0
MCH RBC QN AUTO: 27.2 PG (ref 25.7–33.7)
MCHC RBC AUTO-ENTMCNC: 30.9 G/DL (ref 32–35.9)
MCV RBC: 88 FL (ref 80–96)
PLATELET # BLD AUTO: 299 10^3/UL (ref 134–434)
PMV BLD: 8 FL (ref 7.5–11.1)
POTASSIUM SERPLBLD-SCNC: 3.6 MMOL/L (ref 3.5–5.1)
PROT SERPL-MCNC: 6.3 G/DL (ref 6.4–8.2)
RBC # BLD AUTO: 3.52 M/MM3 (ref 4–5.6)
SODIUM SERPL-SCNC: 137 MMOL/L (ref 136–145)
WBC # BLD AUTO: 16.7 K/MM3 (ref 4–10)

## 2024-01-15 RX ADMIN — POLYETHYLENE GLYCOL 3350 SCH: 17 POWDER, FOR SOLUTION ORAL at 22:56

## 2024-01-15 RX ADMIN — POLYETHYLENE GLYCOL 3350 SCH: 17 POWDER, FOR SOLUTION ORAL at 09:05

## 2024-01-15 RX ADMIN — HEPARIN SODIUM SCH UNIT: 5000 INJECTION, SOLUTION INTRAVENOUS; SUBCUTANEOUS at 22:08

## 2024-01-15 RX ADMIN — FLUTICASONE FUROATE, UMECLIDINIUM BROMIDE AND VILANTEROL TRIFENATATE SCH PUFF: 200; 62.5; 25 POWDER RESPIRATORY (INHALATION) at 09:14

## 2024-01-15 RX ADMIN — ACETAMINOPHEN PRN MG: 500 TABLET, FILM COATED ORAL at 04:46

## 2024-01-15 RX ADMIN — LISINOPRIL SCH MG: 20 TABLET ORAL at 09:12

## 2024-01-15 RX ADMIN — HEPARIN SODIUM SCH UNIT: 5000 INJECTION, SOLUTION INTRAVENOUS; SUBCUTANEOUS at 09:12

## 2024-01-15 RX ADMIN — PIPERACILLIN SODIUM,TAZOBACTAM SODIUM SCH MLS/HR: 3; .375 INJECTION, POWDER, FOR SOLUTION INTRAVENOUS at 14:24

## 2024-01-15 RX ADMIN — METHYLPREDNISOLONE SODIUM SUCCINATE SCH MG: 40 INJECTION, POWDER, FOR SOLUTION INTRAMUSCULAR; INTRAVENOUS at 09:12

## 2024-01-15 RX ADMIN — IPRATROPIUM BROMIDE AND ALBUTEROL SULFATE SCH AMP: .5; 3 SOLUTION RESPIRATORY (INHALATION) at 20:35

## 2024-01-15 RX ADMIN — IPRATROPIUM BROMIDE AND ALBUTEROL SULFATE SCH AMP: .5; 3 SOLUTION RESPIRATORY (INHALATION) at 07:25

## 2024-01-15 RX ADMIN — IPRATROPIUM BROMIDE AND ALBUTEROL SULFATE SCH AMP: .5; 3 SOLUTION RESPIRATORY (INHALATION) at 15:06

## 2024-01-15 RX ADMIN — IPRATROPIUM BROMIDE AND ALBUTEROL SULFATE SCH AMP: .5; 3 SOLUTION RESPIRATORY (INHALATION) at 11:00

## 2024-01-15 RX ADMIN — BENZONATATE SCH MG: 100 CAPSULE ORAL at 06:32

## 2024-01-15 RX ADMIN — SPIRONOLACTONE SCH MG: 25 TABLET, FILM COATED ORAL at 09:12

## 2024-01-15 RX ADMIN — PIPERACILLIN SODIUM,TAZOBACTAM SODIUM SCH MLS/HR: 3; .375 INJECTION, POWDER, FOR SOLUTION INTRAVENOUS at 22:08

## 2024-01-15 RX ADMIN — BENZONATATE SCH MG: 100 CAPSULE ORAL at 13:11

## 2024-01-15 RX ADMIN — PANTOPRAZOLE SODIUM SCH MG: 40 TABLET, DELAYED RELEASE ORAL at 09:12

## 2024-01-15 RX ADMIN — BENZONATATE SCH MG: 100 CAPSULE ORAL at 22:08

## 2024-01-15 RX ADMIN — PIPERACILLIN SODIUM,TAZOBACTAM SODIUM SCH MLS/HR: 3; .375 INJECTION, POWDER, FOR SOLUTION INTRAVENOUS at 02:17

## 2024-01-15 RX ADMIN — PIPERACILLIN SODIUM,TAZOBACTAM SODIUM SCH MLS/HR: 3; .375 INJECTION, POWDER, FOR SOLUTION INTRAVENOUS at 09:12

## 2024-01-16 LAB
ALBUMIN SERPL-MCNC: 2.8 G/DL (ref 3.4–5)
ALP SERPL-CCNC: 75 U/L (ref 45–117)
ALT SERPL-CCNC: 45 U/L (ref 13–61)
ANION GAP SERPL CALC-SCNC: 5 MMOL/L (ref 4–13)
ANISOCYTOSIS BLD QL: (no result)
AST SERPL-CCNC: 12 U/L (ref 15–37)
BILIRUB SERPL-MCNC: 0.4 MG/DL (ref 0.2–1)
BNP SERPL-MCNC: 67.4 PG/ML (ref 5–125)
BUN SERPL-MCNC: 16.4 MG/DL (ref 7–18)
CALCIUM SERPL-MCNC: 8.9 MG/DL (ref 8.5–10.1)
CHLORIDE SERPL-SCNC: 102 MMOL/L (ref 98–107)
CO2 SERPL-SCNC: 32 MMOL/L (ref 21–32)
CREAT SERPL-MCNC: 0.8 MG/DL (ref 0.55–1.3)
DEPRECATED RDW RBC AUTO: 14.5 % (ref 11.9–15.9)
GLUCOSE SERPL-MCNC: 149 MG/DL (ref 74–106)
HCT VFR BLD CALC: 31.3 % (ref 35.4–49)
HGB BLD-MCNC: 9.5 GM/DL (ref 11.7–16.9)
MACROCYTES BLD QL: 0
MCH RBC QN AUTO: 27 PG (ref 25.7–33.7)
MCHC RBC AUTO-ENTMCNC: 30.5 G/DL (ref 32–35.9)
MCV RBC: 88.7 FL (ref 80–96)
PLATELET # BLD AUTO: 321 10^3/UL (ref 134–434)
PMV BLD: 8 FL (ref 7.5–11.1)
POTASSIUM SERPLBLD-SCNC: 4 MMOL/L (ref 3.5–5.1)
PROT SERPL-MCNC: 6.5 G/DL (ref 6.4–8.2)
RBC # BLD AUTO: 3.53 M/MM3 (ref 4–5.6)
SODIUM SERPL-SCNC: 139 MMOL/L (ref 136–145)
WBC # BLD AUTO: 17.3 K/MM3 (ref 4–10)

## 2024-01-16 RX ADMIN — BENZONATATE SCH MG: 100 CAPSULE ORAL at 05:29

## 2024-01-16 RX ADMIN — PIPERACILLIN SODIUM,TAZOBACTAM SODIUM SCH MLS/HR: 3; .375 INJECTION, POWDER, FOR SOLUTION INTRAVENOUS at 15:59

## 2024-01-16 RX ADMIN — ACETAMINOPHEN PRN MG: 500 TABLET, FILM COATED ORAL at 09:35

## 2024-01-16 RX ADMIN — BENZONATATE SCH MG: 100 CAPSULE ORAL at 13:34

## 2024-01-16 RX ADMIN — IPRATROPIUM BROMIDE AND ALBUTEROL SULFATE SCH AMP: .5; 3 SOLUTION RESPIRATORY (INHALATION) at 15:29

## 2024-01-16 RX ADMIN — POLYETHYLENE GLYCOL 3350 SCH: 17 POWDER, FOR SOLUTION ORAL at 22:25

## 2024-01-16 RX ADMIN — SPIRONOLACTONE SCH MG: 25 TABLET, FILM COATED ORAL at 09:35

## 2024-01-16 RX ADMIN — PIPERACILLIN SODIUM,TAZOBACTAM SODIUM SCH MLS/HR: 3; .375 INJECTION, POWDER, FOR SOLUTION INTRAVENOUS at 09:35

## 2024-01-16 RX ADMIN — FLUTICASONE FUROATE, UMECLIDINIUM BROMIDE AND VILANTEROL TRIFENATATE SCH PUFF: 200; 62.5; 25 POWDER RESPIRATORY (INHALATION) at 09:36

## 2024-01-16 RX ADMIN — HEPARIN SODIUM SCH UNIT: 5000 INJECTION, SOLUTION INTRAVENOUS; SUBCUTANEOUS at 09:35

## 2024-01-16 RX ADMIN — IPRATROPIUM BROMIDE AND ALBUTEROL SULFATE SCH AMP: .5; 3 SOLUTION RESPIRATORY (INHALATION) at 20:40

## 2024-01-16 RX ADMIN — FLUTICASONE PROPIONATE SCH SPRAY: 50 SPRAY, METERED NASAL at 13:35

## 2024-01-16 RX ADMIN — BENZONATATE SCH MG: 100 CAPSULE ORAL at 22:24

## 2024-01-16 RX ADMIN — LISINOPRIL SCH MG: 20 TABLET ORAL at 09:36

## 2024-01-16 RX ADMIN — IPRATROPIUM BROMIDE AND ALBUTEROL SULFATE SCH AMP: .5; 3 SOLUTION RESPIRATORY (INHALATION) at 11:40

## 2024-01-16 RX ADMIN — POLYETHYLENE GLYCOL 3350 SCH: 17 POWDER, FOR SOLUTION ORAL at 09:35

## 2024-01-16 RX ADMIN — PANTOPRAZOLE SODIUM SCH MG: 40 TABLET, DELAYED RELEASE ORAL at 09:35

## 2024-01-16 RX ADMIN — IPRATROPIUM BROMIDE AND ALBUTEROL SULFATE SCH AMP: .5; 3 SOLUTION RESPIRATORY (INHALATION) at 07:35

## 2024-01-16 RX ADMIN — PIPERACILLIN SODIUM,TAZOBACTAM SODIUM SCH MLS/HR: 3; .375 INJECTION, POWDER, FOR SOLUTION INTRAVENOUS at 02:39

## 2024-01-16 RX ADMIN — HEPARIN SODIUM SCH UNIT: 5000 INJECTION, SOLUTION INTRAVENOUS; SUBCUTANEOUS at 22:24

## 2024-01-16 RX ADMIN — PIPERACILLIN SODIUM,TAZOBACTAM SODIUM SCH MLS/HR: 3; .375 INJECTION, POWDER, FOR SOLUTION INTRAVENOUS at 22:24

## 2024-01-16 RX ADMIN — METHYLPREDNISOLONE SODIUM SUCCINATE SCH MG: 40 INJECTION, POWDER, FOR SOLUTION INTRAMUSCULAR; INTRAVENOUS at 09:35

## 2024-01-17 RX ADMIN — LISINOPRIL SCH MG: 20 TABLET ORAL at 10:49

## 2024-01-17 RX ADMIN — POLYETHYLENE GLYCOL 3350 SCH: 17 POWDER, FOR SOLUTION ORAL at 21:09

## 2024-01-17 RX ADMIN — IPRATROPIUM BROMIDE AND ALBUTEROL SULFATE SCH AMP: .5; 3 SOLUTION RESPIRATORY (INHALATION) at 07:30

## 2024-01-17 RX ADMIN — HEPARIN SODIUM SCH UNIT: 5000 INJECTION, SOLUTION INTRAVENOUS; SUBCUTANEOUS at 21:08

## 2024-01-17 RX ADMIN — BENZONATATE SCH MG: 100 CAPSULE ORAL at 21:09

## 2024-01-17 RX ADMIN — FLUTICASONE PROPIONATE SCH SPRAY: 50 SPRAY, METERED NASAL at 10:50

## 2024-01-17 RX ADMIN — FLUTICASONE FUROATE, UMECLIDINIUM BROMIDE AND VILANTEROL TRIFENATATE SCH PUFF: 200; 62.5; 25 POWDER RESPIRATORY (INHALATION) at 10:50

## 2024-01-17 RX ADMIN — IPRATROPIUM BROMIDE AND ALBUTEROL SULFATE SCH AMP: .5; 3 SOLUTION RESPIRATORY (INHALATION) at 15:23

## 2024-01-17 RX ADMIN — PANTOPRAZOLE SODIUM SCH MG: 40 TABLET, DELAYED RELEASE ORAL at 10:48

## 2024-01-17 RX ADMIN — PIPERACILLIN SODIUM,TAZOBACTAM SODIUM SCH MLS/HR: 3; .375 INJECTION, POWDER, FOR SOLUTION INTRAVENOUS at 02:23

## 2024-01-17 RX ADMIN — ACETAMINOPHEN PRN MG: 500 TABLET, FILM COATED ORAL at 20:17

## 2024-01-17 RX ADMIN — IPRATROPIUM BROMIDE AND ALBUTEROL SULFATE SCH AMP: .5; 3 SOLUTION RESPIRATORY (INHALATION) at 20:00

## 2024-01-17 RX ADMIN — POLYETHYLENE GLYCOL 3350 SCH GM: 17 POWDER, FOR SOLUTION ORAL at 10:49

## 2024-01-17 RX ADMIN — SPIRONOLACTONE SCH MG: 25 TABLET, FILM COATED ORAL at 10:48

## 2024-01-17 RX ADMIN — HEPARIN SODIUM SCH UNIT: 5000 INJECTION, SOLUTION INTRAVENOUS; SUBCUTANEOUS at 10:49

## 2024-01-17 RX ADMIN — IPRATROPIUM BROMIDE AND ALBUTEROL SULFATE SCH AMP: .5; 3 SOLUTION RESPIRATORY (INHALATION) at 11:47

## 2024-01-17 RX ADMIN — BENZONATATE SCH MG: 100 CAPSULE ORAL at 05:20

## 2024-01-17 RX ADMIN — BENZONATATE SCH MG: 100 CAPSULE ORAL at 14:46

## 2024-01-17 RX ADMIN — PIPERACILLIN SODIUM,TAZOBACTAM SODIUM SCH MLS/HR: 3; .375 INJECTION, POWDER, FOR SOLUTION INTRAVENOUS at 10:49

## 2024-01-17 RX ADMIN — METHYLPREDNISOLONE SODIUM SUCCINATE SCH MG: 40 INJECTION, POWDER, FOR SOLUTION INTRAMUSCULAR; INTRAVENOUS at 10:49

## 2024-01-17 RX ADMIN — ACETAMINOPHEN PRN MG: 500 TABLET, FILM COATED ORAL at 10:48

## 2024-01-18 RX ADMIN — BENZONATATE SCH MG: 100 CAPSULE ORAL at 14:49

## 2024-01-18 RX ADMIN — POLYETHYLENE GLYCOL 3350 SCH GM: 17 POWDER, FOR SOLUTION ORAL at 22:07

## 2024-01-18 RX ADMIN — ACETAMINOPHEN PRN MG: 500 TABLET, FILM COATED ORAL at 22:05

## 2024-01-18 RX ADMIN — FLUTICASONE FUROATE, UMECLIDINIUM BROMIDE AND VILANTEROL TRIFENATATE SCH PUFF: 200; 62.5; 25 POWDER RESPIRATORY (INHALATION) at 10:42

## 2024-01-18 RX ADMIN — PREDNISONE SCH MG: 10 TABLET ORAL at 10:39

## 2024-01-18 RX ADMIN — BENZONATATE SCH MG: 100 CAPSULE ORAL at 05:25

## 2024-01-18 RX ADMIN — SPIRONOLACTONE SCH MG: 25 TABLET, FILM COATED ORAL at 10:39

## 2024-01-18 RX ADMIN — HEPARIN SODIUM SCH UNIT: 5000 INJECTION, SOLUTION INTRAVENOUS; SUBCUTANEOUS at 22:05

## 2024-01-18 RX ADMIN — BENZONATATE SCH MG: 100 CAPSULE ORAL at 22:05

## 2024-01-18 RX ADMIN — IPRATROPIUM BROMIDE AND ALBUTEROL SULFATE SCH AMP: .5; 3 SOLUTION RESPIRATORY (INHALATION) at 07:38

## 2024-01-18 RX ADMIN — PANTOPRAZOLE SODIUM SCH MG: 40 TABLET, DELAYED RELEASE ORAL at 10:39

## 2024-01-18 RX ADMIN — FLUTICASONE PROPIONATE SCH SPRAY: 50 SPRAY, METERED NASAL at 10:41

## 2024-01-18 RX ADMIN — POLYETHYLENE GLYCOL 3350 SCH GM: 17 POWDER, FOR SOLUTION ORAL at 10:39

## 2024-01-18 RX ADMIN — HEPARIN SODIUM SCH UNIT: 5000 INJECTION, SOLUTION INTRAVENOUS; SUBCUTANEOUS at 10:41

## 2024-01-18 RX ADMIN — ACETAMINOPHEN PRN MG: 500 TABLET, FILM COATED ORAL at 10:38

## 2024-01-18 RX ADMIN — LISINOPRIL SCH MG: 20 TABLET ORAL at 10:39

## 2024-01-19 LAB
ALBUMIN SERPL-MCNC: 2.8 G/DL (ref 3.4–5)
ALP SERPL-CCNC: 69 U/L (ref 45–117)
ALT SERPL-CCNC: 38 U/L (ref 13–61)
ANION GAP SERPL CALC-SCNC: 3 MMOL/L (ref 4–13)
ANISOCYTOSIS BLD QL: (no result)
AST SERPL-CCNC: 10 U/L (ref 15–37)
BILIRUB SERPL-MCNC: 0.3 MG/DL (ref 0.2–1)
BUN SERPL-MCNC: 15.9 MG/DL (ref 7–18)
CALCIUM SERPL-MCNC: 9.1 MG/DL (ref 8.5–10.1)
CHLORIDE SERPL-SCNC: 98 MMOL/L (ref 98–107)
CO2 SERPL-SCNC: 34 MMOL/L (ref 21–32)
CREAT SERPL-MCNC: 0.7 MG/DL (ref 0.55–1.3)
DEPRECATED RDW RBC AUTO: 15 % (ref 11.9–15.9)
GLUCOSE SERPL-MCNC: 92 MG/DL (ref 74–106)
HCT VFR BLD CALC: 30.4 % (ref 35.4–49)
HGB BLD-MCNC: 9.4 GM/DL (ref 11.7–16.9)
HIV 1+2 AB+HIV1 P24 AG SERPL QL IA: NEGATIVE
HIV 1+2 AB+HIV1 P24 AG SERPL QL IA: NEGATIVE
MACROCYTES BLD QL: 0
MCH RBC QN AUTO: 27.4 PG (ref 25.7–33.7)
MCHC RBC AUTO-ENTMCNC: 31 G/DL (ref 32–35.9)
MCV RBC: 88.3 FL (ref 80–96)
MOTHER'S HBV SURFACE AG QL: (no result)
PLATELET # BLD AUTO: 349 10^3/UL (ref 134–434)
PMV BLD: 7.6 FL (ref 7.5–11.1)
POTASSIUM SERPLBLD-SCNC: 4 MMOL/L (ref 3.5–5.1)
PROT SERPL-MCNC: 6.4 G/DL (ref 6.4–8.2)
RBC # BLD AUTO: 3.44 M/MM3 (ref 4–5.6)
SODIUM SERPL-SCNC: 135 MMOL/L (ref 136–145)
WBC # BLD AUTO: 15 K/MM3 (ref 4–10)

## 2024-01-19 RX ADMIN — HEPARIN SODIUM SCH UNIT: 5000 INJECTION, SOLUTION INTRAVENOUS; SUBCUTANEOUS at 22:11

## 2024-01-19 RX ADMIN — BENZONATATE SCH MG: 100 CAPSULE ORAL at 14:00

## 2024-01-19 RX ADMIN — SPIRONOLACTONE SCH MG: 25 TABLET, FILM COATED ORAL at 10:06

## 2024-01-19 RX ADMIN — POLYETHYLENE GLYCOL 3350 SCH GM: 17 POWDER, FOR SOLUTION ORAL at 10:05

## 2024-01-19 RX ADMIN — PANTOPRAZOLE SODIUM SCH MG: 40 TABLET, DELAYED RELEASE ORAL at 10:05

## 2024-01-19 RX ADMIN — IPRATROPIUM BROMIDE AND ALBUTEROL SULFATE SCH AMP: .5; 3 SOLUTION RESPIRATORY (INHALATION) at 11:10

## 2024-01-19 RX ADMIN — POLYETHYLENE GLYCOL 3350 SCH GM: 17 POWDER, FOR SOLUTION ORAL at 22:11

## 2024-01-19 RX ADMIN — FLUTICASONE PROPIONATE SCH SPRAY: 50 SPRAY, METERED NASAL at 10:12

## 2024-01-19 RX ADMIN — ACETAMINOPHEN PRN MG: 500 TABLET, FILM COATED ORAL at 13:59

## 2024-01-19 RX ADMIN — ACETAMINOPHEN PRN MG: 500 TABLET, FILM COATED ORAL at 10:08

## 2024-01-19 RX ADMIN — PREDNISONE SCH MG: 10 TABLET ORAL at 10:05

## 2024-01-19 RX ADMIN — IPRATROPIUM BROMIDE AND ALBUTEROL SULFATE SCH AMP: .5; 3 SOLUTION RESPIRATORY (INHALATION) at 15:15

## 2024-01-19 RX ADMIN — LISINOPRIL SCH MG: 20 TABLET ORAL at 10:05

## 2024-01-19 RX ADMIN — IPRATROPIUM BROMIDE AND ALBUTEROL SULFATE SCH AMP: .5; 3 SOLUTION RESPIRATORY (INHALATION) at 19:15

## 2024-01-19 RX ADMIN — BENZONATATE SCH MG: 100 CAPSULE ORAL at 22:11

## 2024-01-19 RX ADMIN — HEPARIN SODIUM SCH UNIT: 5000 INJECTION, SOLUTION INTRAVENOUS; SUBCUTANEOUS at 10:06

## 2024-01-19 RX ADMIN — IPRATROPIUM BROMIDE AND ALBUTEROL SULFATE SCH AMP: .5; 3 SOLUTION RESPIRATORY (INHALATION) at 07:10

## 2024-01-19 RX ADMIN — BENZONATATE SCH MG: 100 CAPSULE ORAL at 06:12

## 2024-01-19 RX ADMIN — FLUTICASONE FUROATE, UMECLIDINIUM BROMIDE AND VILANTEROL TRIFENATATE SCH PUFF: 200; 62.5; 25 POWDER RESPIRATORY (INHALATION) at 10:12

## 2024-01-20 VITALS — HEART RATE: 84 BPM | SYSTOLIC BLOOD PRESSURE: 129 MMHG | DIASTOLIC BLOOD PRESSURE: 74 MMHG | TEMPERATURE: 98 F

## 2024-01-20 VITALS — RESPIRATION RATE: 18 BRPM

## 2024-01-20 RX ADMIN — POLYETHYLENE GLYCOL 3350 SCH GM: 17 POWDER, FOR SOLUTION ORAL at 09:30

## 2024-01-20 RX ADMIN — IPRATROPIUM BROMIDE AND ALBUTEROL SULFATE SCH AMP: .5; 3 SOLUTION RESPIRATORY (INHALATION) at 11:20

## 2024-01-20 RX ADMIN — PREDNISONE SCH MG: 10 TABLET ORAL at 09:30

## 2024-01-20 RX ADMIN — BENZONATATE SCH MG: 100 CAPSULE ORAL at 14:21

## 2024-01-20 RX ADMIN — ACETAMINOPHEN PRN MG: 500 TABLET, FILM COATED ORAL at 09:37

## 2024-01-20 RX ADMIN — ACETAMINOPHEN PRN MG: 500 TABLET, FILM COATED ORAL at 01:45

## 2024-01-20 RX ADMIN — PANTOPRAZOLE SODIUM SCH MG: 40 TABLET, DELAYED RELEASE ORAL at 09:30

## 2024-01-20 RX ADMIN — LISINOPRIL SCH MG: 20 TABLET ORAL at 09:30

## 2024-01-20 RX ADMIN — FLUTICASONE PROPIONATE SCH SPRAY: 50 SPRAY, METERED NASAL at 09:31

## 2024-01-20 RX ADMIN — FLUTICASONE FUROATE, UMECLIDINIUM BROMIDE AND VILANTEROL TRIFENATATE SCH PUFF: 200; 62.5; 25 POWDER RESPIRATORY (INHALATION) at 09:31

## 2024-01-20 RX ADMIN — HEPARIN SODIUM SCH UNIT: 5000 INJECTION, SOLUTION INTRAVENOUS; SUBCUTANEOUS at 09:30

## 2024-01-20 RX ADMIN — BENZONATATE SCH MG: 100 CAPSULE ORAL at 05:54

## 2024-01-20 RX ADMIN — SPIRONOLACTONE SCH MG: 25 TABLET, FILM COATED ORAL at 09:30

## 2024-01-20 RX ADMIN — IPRATROPIUM BROMIDE AND ALBUTEROL SULFATE SCH: .5; 3 SOLUTION RESPIRATORY (INHALATION) at 08:20

## 2024-01-20 RX ADMIN — IPRATROPIUM BROMIDE AND ALBUTEROL SULFATE SCH AMP: .5; 3 SOLUTION RESPIRATORY (INHALATION) at 16:24
